# Patient Record
Sex: FEMALE | Race: AMERICAN INDIAN OR ALASKA NATIVE
[De-identification: names, ages, dates, MRNs, and addresses within clinical notes are randomized per-mention and may not be internally consistent; named-entity substitution may affect disease eponyms.]

---

## 2017-01-23 ENCOUNTER — HOSPITAL ENCOUNTER (OUTPATIENT)
Dept: HOSPITAL 75 - ER | Age: 67
Setting detail: OBSERVATION
LOS: 4 days | Discharge: HOME | End: 2017-01-27
Attending: FAMILY MEDICINE | Admitting: FAMILY MEDICINE
Payer: MEDICARE

## 2017-01-23 VITALS — DIASTOLIC BLOOD PRESSURE: 81 MMHG | SYSTOLIC BLOOD PRESSURE: 134 MMHG

## 2017-01-23 VITALS — BODY MASS INDEX: 35.51 KG/M2 | WEIGHT: 226.25 LBS | HEIGHT: 67 IN

## 2017-01-23 VITALS — SYSTOLIC BLOOD PRESSURE: 146 MMHG | DIASTOLIC BLOOD PRESSURE: 61 MMHG

## 2017-01-23 DIAGNOSIS — F41.9: ICD-10-CM

## 2017-01-23 DIAGNOSIS — F32.9: ICD-10-CM

## 2017-01-23 DIAGNOSIS — E11.622: ICD-10-CM

## 2017-01-23 DIAGNOSIS — I10: ICD-10-CM

## 2017-01-23 DIAGNOSIS — G62.9: ICD-10-CM

## 2017-01-23 DIAGNOSIS — B96.89: ICD-10-CM

## 2017-01-23 DIAGNOSIS — B95.2: ICD-10-CM

## 2017-01-23 DIAGNOSIS — I87.2: ICD-10-CM

## 2017-01-23 DIAGNOSIS — I50.9: ICD-10-CM

## 2017-01-23 DIAGNOSIS — L97.219: ICD-10-CM

## 2017-01-23 DIAGNOSIS — Z79.4: ICD-10-CM

## 2017-01-23 DIAGNOSIS — L03.115: Primary | ICD-10-CM

## 2017-01-23 LAB
ALBUMIN SERPL-MCNC: 4.2 G/DL (ref 3.2–4.5)
ALT SERPL-CCNC: 21 U/L (ref 0–55)
ANION GAP SERPL CALC-SCNC: 8 MMOL/L (ref 5–14)
AST SERPL-CCNC: 17 U/L (ref 5–34)
BASOPHILS # BLD AUTO: 0.1 10^3/UL (ref 0–0.1)
BASOPHILS NFR BLD AUTO: 1 % (ref 0–10)
BILIRUB SERPL-MCNC: 0.4 MG/DL (ref 0.1–1)
BILIRUB UR QL STRIP: NEGATIVE
BUN SERPL-MCNC: 9 MG/DL (ref 7–18)
BUN/CREAT SERPL: 11
CALCIUM SERPL-MCNC: 10.6 MG/DL (ref 8.5–10.1)
CHLORIDE SERPL-SCNC: 102 MMOL/L (ref 98–107)
CO2 SERPL-SCNC: 27 MMOL/L (ref 21–32)
CREAT SERPL-MCNC: 0.84 MG/DL (ref 0.6–1.3)
EOSINOPHIL # BLD AUTO: 0.4 10^3/UL (ref 0–0.3)
EOSINOPHIL NFR BLD AUTO: 5 % (ref 0–10)
ERYTHROCYTE [DISTWIDTH] IN BLOOD BY AUTOMATED COUNT: 14.9 % (ref 10–14.5)
GFR SERPLBLD BASED ON 1.73 SQ M-ARVRAT: > 60 ML/MIN
GLUCOSE SERPL-MCNC: 184 MG/DL (ref 70–105)
KETONES UR QL STRIP: NEGATIVE
LEUKOCYTE ESTERASE UR QL STRIP: (no result)
LYMPHOCYTES # BLD AUTO: 1.3 X 10^3 (ref 1–4)
LYMPHOCYTES NFR BLD AUTO: 14 % (ref 12–44)
MCH RBC QN AUTO: 26 PG (ref 25–34)
MCHC RBC AUTO-ENTMCNC: 32 G/DL (ref 32–36)
MCV RBC AUTO: 82 FL (ref 80–99)
MONOCYTES # BLD AUTO: 0.9 X 10^3 (ref 0–1)
MONOCYTES NFR BLD AUTO: 10 % (ref 0–12)
NEUTROPHILS # BLD AUTO: 6.2 X 10^3 (ref 1.8–7.8)
NEUTROPHILS NFR BLD AUTO: 70 % (ref 42–75)
NITRITE UR QL STRIP: NEGATIVE
PH UR STRIP: 8 [PH] (ref 5–9)
PLATELET # BLD: 272 10^3/UL (ref 130–400)
PMV BLD AUTO: 10.6 FL (ref 7.4–10.4)
POTASSIUM SERPL-SCNC: 4.1 MMOL/L (ref 3.6–5)
PROT SERPL-MCNC: 7.9 G/DL (ref 6.4–8.2)
PROT UR QL STRIP: (no result)
RBC # BLD AUTO: 4.59 10^6/UL (ref 4.35–5.85)
SODIUM SERPL-SCNC: 137 MMOL/L (ref 135–145)
SP GR UR STRIP: 1.01 (ref 1.02–1.02)
UROBILINOGEN UR-MCNC: NORMAL MG/DL
WBC # BLD AUTO: 8.9 10^3/UL (ref 4.3–11)
WBC #/AREA URNS HPF: (no result) /HPF

## 2017-01-23 PROCEDURE — 87088 URINE BACTERIA CULTURE: CPT

## 2017-01-23 PROCEDURE — 87186 SC STD MICRODIL/AGAR DIL: CPT

## 2017-01-23 PROCEDURE — 87077 CULTURE AEROBIC IDENTIFY: CPT

## 2017-01-23 PROCEDURE — 82962 GLUCOSE BLOOD TEST: CPT

## 2017-01-23 PROCEDURE — 80202 ASSAY OF VANCOMYCIN: CPT

## 2017-01-23 PROCEDURE — 87205 SMEAR GRAM STAIN: CPT

## 2017-01-23 PROCEDURE — 85027 COMPLETE CBC AUTOMATED: CPT

## 2017-01-23 PROCEDURE — 87070 CULTURE OTHR SPECIMN AEROBIC: CPT

## 2017-01-23 PROCEDURE — 81000 URINALYSIS NONAUTO W/SCOPE: CPT

## 2017-01-23 PROCEDURE — 83036 HEMOGLOBIN GLYCOSYLATED A1C: CPT

## 2017-01-23 PROCEDURE — 87040 BLOOD CULTURE FOR BACTERIA: CPT

## 2017-01-23 PROCEDURE — 80053 COMPREHEN METABOLIC PANEL: CPT

## 2017-01-23 PROCEDURE — 80048 BASIC METABOLIC PNL TOTAL CA: CPT

## 2017-01-23 PROCEDURE — 36415 COLL VENOUS BLD VENIPUNCTURE: CPT

## 2017-01-23 PROCEDURE — 74177 CT ABD & PELVIS W/CONTRAST: CPT

## 2017-01-23 PROCEDURE — 83605 ASSAY OF LACTIC ACID: CPT

## 2017-01-23 PROCEDURE — 85025 COMPLETE CBC W/AUTO DIFF WBC: CPT

## 2017-01-23 PROCEDURE — 96365 THER/PROPH/DIAG IV INF INIT: CPT

## 2017-01-23 RX ADMIN — Medication SCH ML: at 20:43

## 2017-01-23 RX ADMIN — INSULIN DETEMIR SCH UNIT: 100 INJECTION, SOLUTION SUBCUTANEOUS at 21:52

## 2017-01-23 RX ADMIN — ROPINIROLE SCH MG: 1 TABLET ORAL at 21:53

## 2017-01-23 RX ADMIN — ALPRAZOLAM SCH MG: 0.25 TABLET ORAL at 21:52

## 2017-01-23 RX ADMIN — INSULIN HUMAN SCH UNIT: 100 INJECTION, SOLUTION PARENTERAL at 20:45

## 2017-01-23 NOTE — ED INTEGUMENTARY GENERAL
General


Chief Complaint:  Lower Extremity


Stated Complaint:  BILATERAL LEG SWELLING, DIABETES


Source:  patient


Exam Limitations:  no limitations





History of Present Illness


Time seen by provider:  15:25


Initial Comments


To ER with bilateral lower actually redness and swelling for the past few days.

  She's had chronic swelling to the left leg after having a hip replacement 

about a year and a half ago.  The swelling and redness to the right leg is more 

pronounced over the past few days than usual.  There is now an open sore to 

this area as well.  No fevers or chills.  She is diabetic.


Severity:  moderate


Possible Cause:  no cause identified


Associated Symptoms:   denies symptoms





Allergies and Home Medications


Allergies


Coded Allergies:  


     No Known Drug Allergies (Unverified , 3/11/14)





Home Medications


Aspirin 81 Mg Tablet.dr 243 MG PO DAILY (Reported) 


   TAKES 3 (81 MG) TABLETS  


Calcium Carbonate 200 Mg Tab.chew 400 MG PO AS NEEDED PRN PRN INDIGESTION (

Reported) 


Diclofenac Sod 100 Gm Gel TP QID PRN PRN BACK PAIN (Reported) 


Duloxetine HCl 60 Mg Capsule.dr 60 MG PO DAILY (Reported) 


Furosemide 40 Mg Tablet 40 MG PO DAILY (Reported) 


Hydrocodone/Acetaminophen 1 Each Tablet 1 TAB PO Q4H PRN PRN PAIN (Reported) 


Insulin Aspart 300 Units/3 Ml Solution SQ UD (Reported) 


   PER SLIDING SCALE 


Insulin Glargine,Hum.rec.anlog 100 Unit/1 Ml Insuln.pen 20 UNITS SQ DAILY (

Reported) 


   HOLD IF BLOOD SUGAR IS 95 OR BELOW 


Loperamide HCl 2 Mg Capsule 2 MG PO QID PRN PRN DIARRHEA (Reported) 


Magnesium Hydroxide 400 Mg/5 Ml Oral.susp 30 ML PO DAILY PRN PRN CONSTIPATION (

Reported) 


Metoprolol Succinate 50 Mg Tab.er.24h 25 MG PO DAILY (Reported) 


Multivits W-Ca,Fe,Other Min 1 Each Tablet 1 TAB PO DAILY (Reported) 


Polyethylene Glycol 3350 255 Gm Powder 17 GM PO HS PRN PRN CONSTIPATION (

Reported) 


Pregabalin 150 Mg Capsule 150 MG PO TID (Reported) 


Ropinirole HCl 2 Mg Tablet 2 MG PO BID (Reported) 


Sulfamethoxazole/Trimethoprim 1 Each Tablet #14 1 EACH PO BID 


   Prescribed by: ROSELINE SCHAEFER on 2/9/16 1230


Tramadol HCl 50 Mg Tablet 50 MG PO Q6H PRN PRN PAIN (Reported) 


   TAKES FOR MILD PAIN 


Whey Protein Isolate 1 Each Powd.pack 1 PACKET PO TID (Reported) 





Constitutional:   see HPI


EENTM:   see HPI


Respiratory:   no symptoms reported


Cardiovascular:   no symptoms reported


Genitourinary:   no symptoms reported


Musculoskeletal:   no symptoms reported


Skin:   see HPI


Psychiatric/Neurological:   No Symptoms Reported


Endocrine:   No Symptoms Reported


Hematologic/Lymphatic:   No Symptoms Reported





Past Medical-Social-Family Hx


Patient Social History


Former Smoker/When Quit:  Feb 5, 1992


Recent Foreign Travel:  No


Contact w/Someone Who Travel:  No





Immunizations Up To Date


Tetanus Booster (TDap):  Less than 5yrs


PED Vaccines UTD:  Yes


Date of Pneumonia Vaccine:  Sep 12, 2012


Date of Influenza Vaccine:  Sep 18, 2015





Seasonal Allergies


Seasonal Allergies:  No





Surgeries


HX Surgeries:  Yes (HEMORRHOIDECTOMY, L HIP replacement, reduction left hip 

dislocation x2)


Surgeries:  Gallbladder, Orthopedic





Respiratory


Hx Respiratory Disorders:  No





Cardiovascular


Hx Cardiac Disorders:  Yes (CHF)


Cardiac Disorders:  Hypertension





Neurological


Hx Neurological Disorders:  Yes (RLS)





Reproductive System


Hx Reproductive Disorders:  No


Sexually Transmitted Disease:  No


HIV/AIDS:  No





Genitourinary


Hx Genitourinary Disorders:  No





Gastrointestinal


Hx Gastrointestinal Disorders:  Yes


Gastrointestinal Disorders:  Chronic Constipation, Hemorrhoids





Musculoskeletal


Musculoskeletal Disorders:  Arthritis, Back Injury, Chronic Back Pain





Endocrine


Hx Endocrine Disorders:  Yes (Lantus/ Novolog)


Endocrine Disorders:  Diabetes, Insulin dep





HEENT


HX ENT Disorders:  Yes (report the start of cataracts, migrains)


HEENT Disorders:  Cataract


Loss of Vision:  Bilateral


Hearing Impairment:  Denies





Cancer


Hx Cancer:  No





Psychosocial


Hx Psychiatric Problems:  Yes


Behavioral Health Disorders:  Anxiety, Depression





Integumentary


HX Skin/Integumentary Disorder:  No





Blood Transfusions


Hx Blood Disorders:  No


Adverse Reaction to a Blood Tr:  No





Family Medical History


Significant Family History:  No Pertinent Family Hx


Family Medial History:  


Congestive heart failure


  03 FATHER


Prostate cancer


  03 FATHER





No Family History of:


  Abdominal aortic aneurysm


  Alexander's disease


  Alcoholism


  Aphasia


  Cancer


  Cancer of colon


  Cataract


  Congenital heart disease


  Cystic fibrosis


  Dementia


  Dysphagia


  Family history: Allergy


  Family history: Alzheimer's disease


  Family history: Arthritis


  Family history: Asthma


  Family history: Breast disease


  Family history: Cardiovascular disease


  Family history: Coronary thrombosis


  Family history: Diabetes mellitus


  Family history: Gastrointestinal disease


  Family history: Glaucoma


  Family history: Hypertension


  Family history: Osteoporosis


  Family history: Thyroid disorder


  Headache


  Hearing loss


  Heart disease


  Hereditary disease


  History of - anemia


  History of - disorder


  History of - respiratory disease


  History of drug abuse


  Human immunodeficiency virus (HIV) seropositivity


  Hypercholesterolemia


  Infertile


  Kidney disease


  Malignant neoplasm of lung


  Myocardial infarction


  Parkinson's disease


  Psychotic disorder


  Seizure disorder


  Stroke


  Tuberculosis


  Visual impairment





Physical Exam


Vital Signs





 Vital Sign - Last 12Hours








 1/23/17





 15:35


 


Temp 97.2


 


Pulse 85


 


Resp 20


 


B/P 154/77


 


Pulse Ox 96





Capillary Refill :


General Appearance:   WD/WN no apparent distress


HEENT:   PERRL/EOMI normal ENT inspection


Neck:   non-tender full range of motion


Respiratory:   no respiratory distress no accessory muscle use


Neurologic/Psychiatric:   alert normal mood/affect oriented x 3


Skin:   normal color warm/dry


Skin Problem Character:  erythema


Comments


Erythema to bilateral lower extremities which is likely chronic venous stasis 

dermatitis.  The left leg is swollen no more so than usual but greater than the 

right leg.  We will ultrasound the left leg.  There is an open area to the 

medial aspect of the right lower leg with some drainage.





Progress/Results/Core Measures


Results/Orders


Lab Results





 Laboratory Tests








Test


  1/23/17


15:24 Range/Units


 


 


Alanine Aminotransferase


(ALT/SGPT) 21 


  0-55  U/L


 


 


Albumin 4.2  3.2-4.5  G/DL


 


Alkaline Phosphatase 108    U/L


 


Anion Gap 8  5-14  MMOL/L


 


Aspartate Amino Transf


(AST/SGOT) 17 


  5-34  U/L


 


 


BUN/Creatinine Ratio 11   


 


Basophils # (Auto)


  0.1 


  0.0-0.1


10^3/uL


 


Basophils (%) (Auto) 1  0-10  %


 


Blood Urea Nitrogen 9  7-18  MG/DL


 


Calcium Level 10.6 H 8.5-10.1  MG/DL


 


Carbon Dioxide Level 27  21-32  MMOL/L


 


Chloride Level 102    MMOL/L


 


Creatinine


  0.84 


  0.60-1.30


MG/DL


 


Eosinophils # (Auto)


  0.4 H


  0.0-0.3


10^3/uL


 


Eosinophils (%) (Auto) 5  0-10  %


 


Estimat Glomerular Filtration


Rate > 60 


   


 


 


Glucose Level 184 H   MG/DL


 


Hematocrit 38  35-52  %


 


Hemoglobin 12.1  11.5-16.0  G/DL


 


Lactic Acid Level 1.0  0.5-2.0  MMOL/L


 


Lymphocytes # (Auto) 1.3  1.0-4.0  X 10^3


 


Lymphocytes (%) (Auto) 14  12-44  %


 


Mean Corpuscular Hemoglobin 26  25-34  PG


 


Mean Corpuscular Hemoglobin


Concent 32 


  32-36  G/DL


 


 


Mean Corpuscular Volume 82  80-99  FL


 


Mean Platelet Volume 10.6 H 7.4-10.4  FL


 


Monocytes # (Auto) 0.9  0.0-1.0  X 10^3


 


Monocytes (%) (Auto) 10  0-12  %


 


Neutrophils # (Auto) 6.2  1.8-7.8  X 10^3


 


Neutrophils (%) (Auto) 70  42-75  %


 


Platelet Count


  272 


  130-400


10^3/uL


 


Potassium Level 4.1  3.6-5.0  MMOL/L


 


Red Blood Count


  4.59 


  4.35-5.85


10^6/uL


 


Red Cell Distribution Width 14.9 H 10.0-14.5  %


 


Sodium Level 137  135-145  MMOL/L


 


Total Bilirubin 0.4  0.1-1.0  MG/DL


 


Total Protein 7.9  6.4-8.2  G/DL


 


White Blood Count


  8.9 


  4.3-11.0


10^3/uL








My Orders





 Orders-CHARMAINE MCKENZIE


Cbc With Automated Diff (1/23/17 15:16)


Comprehensive Metabolic Panel (1/23/17 15:16)


Blood Culture (1/23/17 15:16)


Lactic Acid Analyzer (1/23/17 15:16)


Saline Lock/Iv-Start (1/23/17 15:16)


Wound Culture (1/23/17 15:16)


Us Venous Lower Ext Lt (1/23/17 15:31)


Vancomycin Injection (Vancomycin Injecti (1/23/17 16:15)





Vital Signs/I&O





 Vital Sign - Last 12Hours








 1/23/17





 15:35


 


Temp 97.2


 


Pulse 85


 


Resp 20


 


B/P 154/77


 


Pulse Ox 96











Departure


Communication


Time/Spoke to Admitting Phy:  16:09


Communication


Discussed with Dr. Dr. Dacosta.  We'll admit patient for IV antibiotics.  

Consult Dr. Mcnally.





Impression


Impression:  


 Primary Impression:  


 Cellulitis of right leg


Disposition:  09 ADMITTED AS INPATIENT


Condition:  Stable


Decision to Admit Reason:  Admit from ER (General)


Decision to Admit/Date:  Jan 23, 2017


Time/Decision to Admit Time:  16:09





Departure-Patient Inst.


Referrals:  


LELAND DACOSTA DO (PCP/Family)


Primary Care Physician








CHARMAINE MCKENZIE Jan 23, 2017 15:28


Primary Care Physician








CHARMAINE MCKENZIE Jan 23, 2017 15:28

## 2017-01-23 NOTE — DIAGNOSTIC IMAGING REPORT
PROCEDURE: US left lower extremity venous.



TECHNIQUE: Multiple real-time grayscale images were obtained

over the left lower extremity in various projections. Additional

duplex Doppler and color Doppler images were also obtained.



INDICATION: Left leg redness and swelling.



FINDINGS: Left extremity shows normal color flow Doppler imaging

of the venous system from the external iliac vein to the ankle.

Calf compression shows normal augmentation of flow at the

popliteal level. No evidence of popliteal cyst.



IMPRESSION: Normal left lower extremity color duplex venous

ultrasound.



Dictated by:



Dictated on workstation # [id number]

## 2017-01-24 VITALS — DIASTOLIC BLOOD PRESSURE: 60 MMHG | SYSTOLIC BLOOD PRESSURE: 126 MMHG

## 2017-01-24 VITALS — SYSTOLIC BLOOD PRESSURE: 139 MMHG | DIASTOLIC BLOOD PRESSURE: 63 MMHG

## 2017-01-24 VITALS — DIASTOLIC BLOOD PRESSURE: 63 MMHG | SYSTOLIC BLOOD PRESSURE: 135 MMHG

## 2017-01-24 VITALS — SYSTOLIC BLOOD PRESSURE: 132 MMHG | DIASTOLIC BLOOD PRESSURE: 71 MMHG

## 2017-01-24 VITALS — SYSTOLIC BLOOD PRESSURE: 136 MMHG | DIASTOLIC BLOOD PRESSURE: 63 MMHG

## 2017-01-24 VITALS — DIASTOLIC BLOOD PRESSURE: 67 MMHG | SYSTOLIC BLOOD PRESSURE: 121 MMHG

## 2017-01-24 LAB
ALBUMIN SERPL-MCNC: 3.4 G/DL (ref 3.2–4.5)
ALT SERPL-CCNC: 16 U/L (ref 0–55)
ANION GAP SERPL CALC-SCNC: 9 MMOL/L (ref 5–14)
AST SERPL-CCNC: 15 U/L (ref 5–34)
BILIRUB SERPL-MCNC: 0.3 MG/DL (ref 0.1–1)
BUN SERPL-MCNC: 9 MG/DL (ref 7–18)
BUN/CREAT SERPL: 12
CALCIUM SERPL-MCNC: 9.8 MG/DL (ref 8.5–10.1)
CHLORIDE SERPL-SCNC: 107 MMOL/L (ref 98–107)
CO2 SERPL-SCNC: 25 MMOL/L (ref 21–32)
CREAT SERPL-MCNC: 0.74 MG/DL (ref 0.6–1.3)
ERYTHROCYTE [DISTWIDTH] IN BLOOD BY AUTOMATED COUNT: 14.9 % (ref 10–14.5)
GFR SERPLBLD BASED ON 1.73 SQ M-ARVRAT: > 60 ML/MIN
GLUCOSE SERPL-MCNC: 102 MG/DL (ref 70–105)
MCH RBC QN AUTO: 26 PG (ref 25–34)
MCHC RBC AUTO-ENTMCNC: 32 G/DL (ref 32–36)
MCV RBC AUTO: 83 FL (ref 80–99)
PLATELET # BLD: 257 10^3/UL (ref 130–400)
PMV BLD AUTO: 10.7 FL (ref 7.4–10.4)
POTASSIUM SERPL-SCNC: 4 MMOL/L (ref 3.6–5)
PROT SERPL-MCNC: 6.4 G/DL (ref 6.4–8.2)
RBC # BLD AUTO: 4.12 10^6/UL (ref 4.35–5.85)
SODIUM SERPL-SCNC: 141 MMOL/L (ref 135–145)
WBC # BLD AUTO: 8.3 10^3/UL (ref 4.3–11)

## 2017-01-24 RX ADMIN — Medication SCH ML: at 14:53

## 2017-01-24 RX ADMIN — ENOXAPARIN SODIUM SCH MG: 100 INJECTION SUBCUTANEOUS at 08:29

## 2017-01-24 RX ADMIN — INSULIN HUMAN SCH UNIT: 100 INJECTION, SOLUTION PARENTERAL at 10:20

## 2017-01-24 RX ADMIN — ALPRAZOLAM SCH MG: 0.25 TABLET ORAL at 08:28

## 2017-01-24 RX ADMIN — INSULIN HUMAN SCH UNIT: 100 INJECTION, SOLUTION PARENTERAL at 16:00

## 2017-01-24 RX ADMIN — Medication SCH ML: at 05:36

## 2017-01-24 RX ADMIN — MICONAZOLE NITRATE SCH APPLIC: 20 POWDER TOPICAL at 22:19

## 2017-01-24 RX ADMIN — ROPINIROLE SCH MG: 1 TABLET ORAL at 22:18

## 2017-01-24 RX ADMIN — PREGABALIN SCH MG: 75 CAPSULE ORAL at 10:11

## 2017-01-24 RX ADMIN — VANCOMYCIN HYDROCHLORIDE SCH MLS/HR: 1 INJECTION, POWDER, LYOPHILIZED, FOR SOLUTION INTRAVENOUS at 19:49

## 2017-01-24 RX ADMIN — INSULIN HUMAN SCH UNIT: 100 INJECTION, SOLUTION PARENTERAL at 05:47

## 2017-01-24 RX ADMIN — FUROSEMIDE SCH MG: 40 TABLET ORAL at 08:28

## 2017-01-24 RX ADMIN — PREGABALIN SCH MG: 75 CAPSULE ORAL at 22:19

## 2017-01-24 RX ADMIN — VANCOMYCIN HYDROCHLORIDE SCH MLS/HR: 1 INJECTION, POWDER, LYOPHILIZED, FOR SOLUTION INTRAVENOUS at 06:35

## 2017-01-24 RX ADMIN — PREGABALIN SCH MG: 75 CAPSULE ORAL at 14:53

## 2017-01-24 RX ADMIN — INSULIN DETEMIR SCH UNIT: 100 INJECTION, SOLUTION SUBCUTANEOUS at 08:29

## 2017-01-24 RX ADMIN — PANTOPRAZOLE SODIUM SCH MG: 40 TABLET, DELAYED RELEASE ORAL at 06:35

## 2017-01-24 RX ADMIN — ROPINIROLE SCH MG: 1 TABLET ORAL at 08:28

## 2017-01-24 RX ADMIN — DULOXETINE HYDROCHLORIDE SCH MG: 30 CAPSULE, DELAYED RELEASE ORAL at 08:28

## 2017-01-24 RX ADMIN — INSULIN DETEMIR SCH UNIT: 100 INJECTION, SOLUTION SUBCUTANEOUS at 22:18

## 2017-01-24 RX ADMIN — Medication SCH ML: at 22:19

## 2017-01-24 RX ADMIN — ALPRAZOLAM SCH MG: 0.25 TABLET ORAL at 22:18

## 2017-01-24 RX ADMIN — INSULIN HUMAN SCH UNIT: 100 INJECTION, SOLUTION PARENTERAL at 22:17

## 2017-01-24 RX ADMIN — ROSUVASTATIN CALCIUM SCH MG: 5 TABLET, FILM COATED ORAL at 22:18

## 2017-01-24 RX ADMIN — MICONAZOLE NITRATE SCH APPLIC: 20 POWDER TOPICAL at 10:10

## 2017-01-24 NOTE — HISTORY & PHYSICIAL
History of Present Illness


History of Present Illness


Reason for visit/HPI


patient came to the emergency room due to redness of both legs the right one is 

worse.


Patient has cellulitis of legs.


Patient admitted for IV antibiotics and wound care


Date of Admission


2017 at 16:06


I consulted on this patient on


17


 08:08


Attending Physician


Edvin Dacosta DO


Admitting Physician


Edvin Dacosta DO


Consult








Allergies and Home Medications


Allergies


Coded Allergies:  


     No Known Drug Allergies (Unverified , 3/11/14)





Home Medications


Alprazolam 0.25 Mg Tablet 0.25 MG PO BID (Reported) 


Calcium Carbonate 200 Mg Tab.chew 400 MG PO AS NEEDED PRN PRN INDIGESTION (

Reported) 


Duloxetine HCl 60 Mg Capsule.dr 30 MG PO DAILY (Reported) 


Esomeprazole Magnesium 40 Mg Cap 40 MG PO DAILY (Reported) 


Furosemide 40 Mg Tablet 40 MG PO DAILY (Reported) 


Insulin Aspart 300 Units/3 Ml Solution SQ UD (Reported) 


   PER SLIDING SCALE 


Insulin Glargine,Hum.rec.anlog 100 Unit/1 Ml Insuln.pen 40 UNITS SQ BID (

Reported) 


   HOLD IF BLOOD SUGAR IS 95 OR BELOW 


Multivits W-Ca,Fe,Other Min 1 Each Tablet 1 TAB PO DAILY (Reported) 


Pregabalin 150 Mg Capsule 150 MG PO TID (Reported) 


Ropinirole HCl 2 Mg Tablet 2 MG PO BID (Reported) 


Rosuvastatin Calcium 10 Mg Tablet 10 MG PO DAILY (Reported) 


Tramadol HCl 50 Mg Tablet 50 MG PO Q8H (Reported) 


   TAKES FOR MILD PAIN 





Past Medical-Social-Family Hx


Patient Social History


Marrital Status:  


Employed/Student:  unemployed


Alcohol Use:  Denies Use


Recreational Drug Use:  No


Smoking Status:  Never a Smoker


Former smoker/When Quit:  1992


Physical Abuse Screen:  No


Sexual Abuse:  No


Recent Foreign Travel:  No


Contact w/other who traveled:  No


Recent Hopitalizations:  No


Recent Infectious Disease Expo:  No





Immunizations Up To Date


Tetanus Booster (TDap):  Less than 5yrs


Date of Pneumonia Vaccine:  Sep 12, 2012


Date of Influenza Vaccine:  Sep 18, 2016





Seasonal Allergies


Seasonal Allergies:  No





Surgeries


HX Surgeries:  Yes (HEMORRHOIDECTOMY, L HIP replacement, reduction left hip 

dislocation x2)


Surgeries:  Gallbladder, Orthopedic





Respiratory


Hx Respiratory Disorders:  No





Cardiovascular


Hx Cardiovascular Disorders:  Yes (CHF)


Cardiac Disorders:  Hypertension





Neurological


Hx Neurological Disorders:  Yes (RLS)


Neurological Disorders:  Neuropathy





Reproductive System


Pregnant:  No


Hx Reproductive Disorders:  No


Sexually Transmitted Disease:  No


HIV/AIDS:  No





Genitourinary


Hx Genitourinary Disorders:  No





Gastrointestinal


Hx Gastrointestinal Disorders:  Yes


Gastrointestinal Disorders:  Chronic Constipation, Hemorrhoids





Musculoskeletal


Musculoskeletal Disorders:  Arthritis, Back Injury, Chronic Back Pain





Endocrine


Hx Endocrine Disorders:  Yes (Lantus/ Novolog)


Endocrine Disorders:  Diabetes, Insulin dep





HEENT


HX ENT Disorders:  Yes (report the start of cataracts, migrains)


HEENT Disorders:  Cataract


Loss of Vision:  Bilateral


Hearing Impairment:  Denies





Cancer


Hx Cancer:  No





Psychosocial


Hx Psychiatric Problems:  Yes


Behavioral Health Disorders:  Anxiety, Depression





Integumentary


HX Skin/Integumentary Disorder:  Yes (SEEING WOUND CARE FOR WOUND ON FEET)





Blood Transfusions


Hx Blood Disorders:  No


Adverse Reaction to a Blood Tr:  No





Family Medical History


Significant Family History:  No Pertinent Family Hx


Family Hx:  


Congestive heart failure


  03 FATHER


Prostate cancer


  03 FATHER





No Family History of:


  Abdominal aortic aneurysm


  Alexander's disease


  Alcoholism


  Aphasia


  Cancer


  Cancer of colon


  Cataract


  Congenital heart disease


  Cystic fibrosis


  Dementia


  Dysphagia


  Family history: Allergy


  Family history: Alzheimer's disease


  Family history: Arthritis


  Family history: Asthma


  Family history: Breast disease


  Family history: Cardiovascular disease


  Family history: Coronary thrombosis


  Family history: Diabetes mellitus


  Family history: Gastrointestinal disease


  Family history: Glaucoma


  Family history: Hypertension


  Family history: Osteoporosis


  Family history: Thyroid disorder


  Headache


  Hearing loss


  Heart disease


  Hereditary disease


  History of - anemia


  History of - disorder


  History of - respiratory disease


  History of drug abuse


  Human immunodeficiency virus (HIV) seropositivity


  Hypercholesterolemia


  Infertile


  Kidney disease


  Malignant neoplasm of lung


  Myocardial infarction


  Parkinson's disease


  Psychotic disorder


  Seizure disorder


  Stroke


  Tuberculosis


  Visual impairment





Constitutional:   weakness


EENTM:   no symptoms reported


Respiratory:   no symptoms reported


Cardiovascular:   no symptoms reported


Gastrointestinal:   no symptoms reported


Genitourinary:   no symptoms reported





Physical Exam


Vital Signs





 Vital Sign - Last 12Hours








 17





 15:35 17:45


 


Temp 97.2 


 


Pulse 85 


 


Resp 20 


 


B/P 154/77 


 


Pulse Ox 96 


 


O2 Delivery  Room Air





Capillary Refill : Less Than 3 SecondsLess Than 3 Seconds


General Appearance:   No Apparent Distress WD/WN


Eyes:  Bilateral Eye Normal Inspection


HEENT:   Normal ENT Inspection


Neck:   Full Range of Motion Normal Inspection


Respiratory:   Chest Non Tender Lungs Clear Normal Breath Sounds No Accessory 

Muscle Use No Respiratory Distress


Cardiovascular:   Regular Rate, Rhythm No Murmur


Gastrointestinal:   Non Tender Soft





Assessment/Plan


Assessment and Plan


cellulitis of legs worse on right.


Diabetes





Clinical Quality Measures


DVT/VTE Risk/Contraindication:


Risk Factor Score Per Nursin


RFS Level Per Nursing on Admit:  4+=Very High








EDVIN DACOSTA DO 2017 08:11

## 2017-01-24 NOTE — WOUND CARE PROGRESS NOTE
Subjective


Subjective


Subjective/Events-last exam


67 year old female with worsening cellulitis of bilateral lower extremities.  

She has been admitted overnight, with IV antibiotics and elevation.  Her leg 

has improved.  She has an ulcer with crusting on the medial right calf, which 

appears to be adjacent to the area of cellulitis on that side.  





PMH: CHF, HTN, DM, Neuropathy, Anxiety, Depression.





FH:  CHF, cancer.





SH:  resides assisted living.





Review of Systems


General:  No Chills,  Fatigue


HEENT:   Head Aches


Pulmonary:  No Dyspnea


Cardiovascular:  No: Chest Pain


Musculoskeletal:  : leg pain





Objective


Exam


Last Set of Vital Signs





Vital Signs








  Date Time  Temp Pulse Resp B/P Pulse Ox O2 Delivery O2 Flow Rate FiO2


 


1/24/17 15:55 97.5 75 22 136/63 100 Room Air  





Capillary Refill : Less Than 3 SecondsLess Than 3 Seconds


I&O


Bad tableGeneral:  Alert, No Acute Distress


Lungs:  Clear to Auscultation, Normal Air Movement


Heart:  Regular Rate, No Murmurs


Abdomen:  Normal Bowel Sounds, Soft


Skin:  Other (R medial calf  --  3.7 x 2.0 x 0.1 cm; base dessicated, no 

drainage.)





Results


Lab


 Laboratory Tests


1/23/17 20:44: Glucometer 150H


1/23/17 21:28: Glucometer 182H


1/24/17 05:20: 


Alanine Aminotransferase (ALT/SGPT) 16, Albumin 3.4, Alkaline Phosphatase 85, 

Anion Gap 9, Aspartate Amino Transf (AST/SGOT) 15, BUN/Creatinine Ratio 12, 

Blood Urea Nitrogen 9, Calcium Level 9.8, Carbon Dioxide Level 25, Chloride 

Level 107, Creatinine 0.74, Estimat Glomerular Filtration Rate > 60, Glucose 

Level 102, Hematocrit 34L, Hemoglobin 10.8L, Hemoglobin A1c 7.9H, Mean 

Corpuscular Hemoglobin 26, Mean Corpuscular Hemoglobin Concent 32, Mean 

Corpuscular Volume 83, Mean Platelet Volume 10.7H, Platelet Count 257, 

Potassium Level 4.0, Red Blood Count 4.12L, Red Cell Distribution Width 14.9H, 

Sodium Level 141, Total Bilirubin 0.3, Total Protein 6.4, White Blood Count 8.3


1/24/17 05:45: Glucometer 105


1/24/17 10:16: Glucometer 71


1/24/17 16:00: Glucometer 131H


1/24/17 18:36: Vancomycin Level Trough 14.7





 Microbiology


1/23/17 Blood Culture - Preliminary, Resulted


          No growth


1/23/17 Urine Culture - Preliminary, Resulted


          


1/23/17 Gram Stain - Final, Resulted


          


1/23/17 Wound Culture - Preliminary, Resulted


          Enterococcus Faecalis


          Bacillus Species





Assessment/Plan


1. Cellulitis, BLE.





2. Venous insufficiency.





3.  Diabetes mellitus.





Plan:  Elevation, leave wound open to air, arterial evaluation.








ROSHNI MOREIRA MD Jan 24, 2017 19:46

## 2017-01-25 VITALS — DIASTOLIC BLOOD PRESSURE: 77 MMHG | SYSTOLIC BLOOD PRESSURE: 113 MMHG

## 2017-01-25 VITALS — DIASTOLIC BLOOD PRESSURE: 66 MMHG | SYSTOLIC BLOOD PRESSURE: 128 MMHG

## 2017-01-25 VITALS — SYSTOLIC BLOOD PRESSURE: 143 MMHG | DIASTOLIC BLOOD PRESSURE: 80 MMHG

## 2017-01-25 LAB
ALBUMIN SERPL-MCNC: 3.4 G/DL (ref 3.2–4.5)
ALT SERPL-CCNC: 17 U/L (ref 0–55)
ANION GAP SERPL CALC-SCNC: 10 MMOL/L (ref 5–14)
AST SERPL-CCNC: 14 U/L (ref 5–34)
BASOPHILS # BLD AUTO: 0.1 10^3/UL (ref 0–0.1)
BASOPHILS NFR BLD AUTO: 2 % (ref 0–10)
BILIRUB SERPL-MCNC: 0.2 MG/DL (ref 0.1–1)
BUN SERPL-MCNC: 20 MG/DL (ref 7–18)
BUN/CREAT SERPL: 25
CALCIUM SERPL-MCNC: 9.3 MG/DL (ref 8.5–10.1)
CHLORIDE SERPL-SCNC: 105 MMOL/L (ref 98–107)
CO2 SERPL-SCNC: 24 MMOL/L (ref 21–32)
CREAT SERPL-MCNC: 0.81 MG/DL (ref 0.6–1.3)
EOSINOPHIL # BLD AUTO: 0.5 10^3/UL (ref 0–0.3)
EOSINOPHIL NFR BLD AUTO: 8 % (ref 0–10)
ERYTHROCYTE [DISTWIDTH] IN BLOOD BY AUTOMATED COUNT: 14.8 % (ref 10–14.5)
GFR SERPLBLD BASED ON 1.73 SQ M-ARVRAT: > 60 ML/MIN
GLUCOSE SERPL-MCNC: 102 MG/DL (ref 70–105)
LYMPHOCYTES # BLD AUTO: 1.3 X 10^3 (ref 1–4)
LYMPHOCYTES NFR BLD AUTO: 18 % (ref 12–44)
MCH RBC QN AUTO: 27 PG (ref 25–34)
MCHC RBC AUTO-ENTMCNC: 32 G/DL (ref 32–36)
MCV RBC AUTO: 83 FL (ref 80–99)
MONOCYTES # BLD AUTO: 0.8 X 10^3 (ref 0–1)
MONOCYTES NFR BLD AUTO: 11 % (ref 0–12)
NEUTROPHILS # BLD AUTO: 4.4 X 10^3 (ref 1.8–7.8)
NEUTROPHILS NFR BLD AUTO: 61 % (ref 42–75)
PLATELET # BLD: 254 10^3/UL (ref 130–400)
PMV BLD AUTO: 10 FL (ref 7.4–10.4)
POTASSIUM SERPL-SCNC: 4 MMOL/L (ref 3.6–5)
PROT SERPL-MCNC: 6.3 G/DL (ref 6.4–8.2)
RBC # BLD AUTO: 4.23 10^6/UL (ref 4.35–5.85)
SODIUM SERPL-SCNC: 139 MMOL/L (ref 135–145)
WBC # BLD AUTO: 7.1 10^3/UL (ref 4.3–11)

## 2017-01-25 RX ADMIN — VANCOMYCIN HYDROCHLORIDE SCH MLS/HR: 1 INJECTION, POWDER, LYOPHILIZED, FOR SOLUTION INTRAVENOUS at 05:45

## 2017-01-25 RX ADMIN — Medication SCH ML: at 12:06

## 2017-01-25 RX ADMIN — INSULIN HUMAN SCH UNIT: 100 INJECTION, SOLUTION PARENTERAL at 11:00

## 2017-01-25 RX ADMIN — DOCUSATE SODIUM SCH MG: 100 CAPSULE ORAL at 21:20

## 2017-01-25 RX ADMIN — PREGABALIN SCH MG: 75 CAPSULE ORAL at 21:20

## 2017-01-25 RX ADMIN — INSULIN HUMAN SCH UNIT: 100 INJECTION, SOLUTION PARENTERAL at 05:46

## 2017-01-25 RX ADMIN — PANTOPRAZOLE SODIUM SCH MG: 40 TABLET, DELAYED RELEASE ORAL at 05:44

## 2017-01-25 RX ADMIN — MICONAZOLE NITRATE SCH APPLIC: 20 POWDER TOPICAL at 21:22

## 2017-01-25 RX ADMIN — INSULIN HUMAN SCH UNIT: 100 INJECTION, SOLUTION PARENTERAL at 21:21

## 2017-01-25 RX ADMIN — DULOXETINE HYDROCHLORIDE SCH MG: 30 CAPSULE, DELAYED RELEASE ORAL at 08:03

## 2017-01-25 RX ADMIN — ROPINIROLE SCH MG: 1 TABLET ORAL at 08:03

## 2017-01-25 RX ADMIN — PREGABALIN SCH MG: 75 CAPSULE ORAL at 08:04

## 2017-01-25 RX ADMIN — FUROSEMIDE SCH MG: 40 TABLET ORAL at 08:03

## 2017-01-25 RX ADMIN — INSULIN HUMAN SCH UNIT: 100 INJECTION, SOLUTION PARENTERAL at 16:00

## 2017-01-25 RX ADMIN — ENOXAPARIN SODIUM SCH MG: 100 INJECTION SUBCUTANEOUS at 08:03

## 2017-01-25 RX ADMIN — PREGABALIN SCH MG: 75 CAPSULE ORAL at 12:06

## 2017-01-25 RX ADMIN — ALPRAZOLAM SCH MG: 0.25 TABLET ORAL at 08:03

## 2017-01-25 RX ADMIN — MICONAZOLE NITRATE SCH APPLIC: 20 POWDER TOPICAL at 08:04

## 2017-01-25 RX ADMIN — INSULIN DETEMIR SCH UNIT: 100 INJECTION, SOLUTION SUBCUTANEOUS at 21:21

## 2017-01-25 RX ADMIN — ROPINIROLE SCH MG: 1 TABLET ORAL at 21:20

## 2017-01-25 RX ADMIN — Medication SCH ML: at 21:21

## 2017-01-25 RX ADMIN — MAGNESIUM HYDROXIDE PRN ML: 400 SUSPENSION ORAL at 16:26

## 2017-01-25 RX ADMIN — Medication SCH ML: at 05:45

## 2017-01-25 RX ADMIN — VANCOMYCIN HYDROCHLORIDE SCH MLS/HR: 1 INJECTION, POWDER, LYOPHILIZED, FOR SOLUTION INTRAVENOUS at 20:03

## 2017-01-25 RX ADMIN — ALPRAZOLAM SCH MG: 0.25 TABLET ORAL at 21:20

## 2017-01-25 RX ADMIN — INSULIN DETEMIR SCH UNIT: 100 INJECTION, SOLUTION SUBCUTANEOUS at 08:04

## 2017-01-25 RX ADMIN — ROSUVASTATIN CALCIUM SCH MG: 5 TABLET, FILM COATED ORAL at 21:20

## 2017-01-25 NOTE — DIAGNOSTIC IMAGING REPORT
PROCEDURE:

CT abdomen and pelvis with contrast.



TECHNIQUE:

Multiple contiguous axial images were obtained through the

abdomen and pelvis after administration of intravenous contrast.





INDICATION:

Right lower quadrant pain.



FINDINGS:

In the interval since the previous CT abdomen/pelvis exam of

03/10/2014, bilateral total hip prostheses have been inserted.

The prostheses seem to be in good position but the artifact

related to the prostheses does limit the evaluation of the

pelvic contents. There is no pelvic mass or free fluid

collection noted. The uterus and urinary bladder are grossly

unremarkable and the appendix is not abnormally thickened. There

is no pelvic mass or free fluid collection evident.



The images through the upper abdomen show that the stomach is

distended by gas, fluid, and particulate matter. This appearance

is similar to the previous CT chest exam of 07/04/2015. There is

no sign of a mass to cause a gastric outlet obstruction;

however, if further evaluation of the stomach is desired, then

an upper GI exam or endoscopy should be considered.



The liver, spleen, pancreas, kidneys, aorta, and inferior vena

cava show no sign of an acute abnormality. The gallbladder is

surgically absent. According to the patient's history, she has

also had a prior left adrenalectomy; however, on this exam, both

adrenal glands are visualized. Correlation with the patient's

surgical history would be recommended.



The lung bases are clear. The bone windows show no sign of a

fracture or of a destructive lesion.



IMPRESSION:

1. The evaluation of the pelvic contents is limited due to

streak artifact related to the total hip prostheses. There is no

acute pelvic abnormality visualized, however. In particular,

there is no sign of appendicitis.



2. The stomach is distended by fluid, particulate matter, and

gas. There is no sign of a mass to suggest a gastric outlet

obstruction but either an upper GI exam or endoscopy should be

considered for further evaluation.



3. There is no acute abnormality of the abdomen or pelvis noted

otherwise.



4. Both adrenal glands were visualized. Correlation with the

patient's surgical history would be recommended.



Dictated by:



Dictated on workstation # [id number]

## 2017-01-25 NOTE — PROGRESS NOTE (SOAP)
Subjective


Subjective/Events-last exam


cellulitis of both legs worse on the right.


Venous insufficiency.


Diabetes mellitus.


Wound culture preliminary of legs Enterococcus faecalis and Bacillus suspicious.


sensitive to vancomycin with enterococcus.


Patient complaining of right lower abdominal pain for the last few months 


Patient states pain is stabbing at times


Patient feels that cellulitis is improving


































































































 Bacillus species.


Sensitive to vancomycin which she is on





Objective


Exam





 Vital Signs








  Date Time  Temp Pulse Resp B/P Pulse Ox O2 Delivery O2 Flow Rate FiO2


 


17 03:34 97.3 76 16 128/66 96 Room Air  


 


17 23:46 96.6 68 12 121/67 98 Room Air  


 


17 21:00     93 Room Air  


 


17 20:30 97.7 71 22 132/71 98 Room Air  


 


17 15:55 97.5 75 22 136/63 100 Room Air  


 


17 12:00 97.2 75 18 135/63 96 Room Air  


 


17 09:00      Room Air  


 


17 08:00 95.9 79 18 126/60 97 Room Air  














 I & O 


 


 17





 07:00


 


Intake Total 1750 ml


 


Output Total 1325 ml


 


Balance 425 ml





Capillary Refill : Less Than 3 SecondsLess Than 3 Seconds


General Appearance:   No Apparent Distress WD/WN


HEENT:   Normal ENT Inspection


Neck:   Normal Inspection


Respiratory:   Chest Non Tender Lungs Clear Normal Breath Sounds No Accessory 

Muscle Use No Respiratory Distress


Cardiovascular:   Regular Rate, Rhythm


Gastrointestinal:   non tender soft


Extremity:   Other (cellulitis of legs worse on right)





Results


Lab


 Laboratory Tests


17 10:16: Glucometer 71


17 16:00: Glucometer 131H


17 18:36: Vancomycin Level Trough 14.7


17 21:08: Glucometer 153H


17 05:45: 


Alanine Aminotransferase (ALT/SGPT) 17, Albumin 3.4, Alkaline Phosphatase 90, 

Anion Gap 10, Aspartate Amino Transf (AST/SGOT) 14, BUN/Creatinine Ratio 25, 

Basophils # (Auto) 0.1, Basophils (%) (Auto) 2, Blood Urea Nitrogen 20H, 

Calcium Level 9.3, Carbon Dioxide Level 24, Chloride Level 105, Creatinine 0.81

, Eosinophils # (Auto) 0.5H, Eosinophils (%) (Auto) 8, Estimat Glomerular 

Filtration Rate > 60, Glucose Level 102, Hematocrit 35, Hemoglobin 11.2L, 

Lymphocytes # (Auto) 1.3, Lymphocytes (%) (Auto) 18, Mean Corpuscular 

Hemoglobin 27, Mean Corpuscular Hemoglobin Concent 32, Mean Corpuscular Volume 

83, Mean Platelet Volume 10.0, Monocytes # (Auto) 0.8, Monocytes (%) (Auto) 11, 

Neutrophils # (Auto) 4.4, Neutrophils (%) (Auto) 61, Platelet Count 254, 

Potassium Level 4.0, Red Blood Count 4.23L, Red Cell Distribution Width 14.8H, 

Sodium Level 139, Total Bilirubin 0.2, Total Protein 6.3L, White Blood Count 7.1


17 05:46: Glucometer 97





 Microbiology


17 Blood Culture - Preliminary, Resulted


          No growth


17 Urine Culture - Preliminary, Resulted


          


17 Gram Stain - Final, Resulted


          


17 Wound Culture - Preliminary, Resulted


          Enterococcus Faecalis


          Bacillus Species





Assessment/Plan


Assessment/Plan


Assess & Plan/Chief Complaint


cellulitis of both legs.


Wound culture preliminary report Enterococcus faecalis that was sensitive to 

vancomycin and bacillus species..


Right side of abdomen pain the last few months


Diagnosis/Problems:  





Clinical Quality Measures


DVT/VTE Risk/Contraindication:


Risk Factor Score Per Nursin


RFS Level Per Nursing on Admit:  4+=Very High


Contraindications-Mechi:  Other *list below*








LELAND DACOSTA DO 2017 07:28

## 2017-01-26 VITALS — DIASTOLIC BLOOD PRESSURE: 65 MMHG | SYSTOLIC BLOOD PRESSURE: 137 MMHG

## 2017-01-26 VITALS — SYSTOLIC BLOOD PRESSURE: 108 MMHG | DIASTOLIC BLOOD PRESSURE: 63 MMHG

## 2017-01-26 VITALS — SYSTOLIC BLOOD PRESSURE: 182 MMHG | DIASTOLIC BLOOD PRESSURE: 76 MMHG

## 2017-01-26 VITALS — DIASTOLIC BLOOD PRESSURE: 81 MMHG | SYSTOLIC BLOOD PRESSURE: 177 MMHG

## 2017-01-26 LAB
ANION GAP SERPL CALC-SCNC: 10 MMOL/L (ref 5–14)
BASOPHILS # BLD AUTO: 0.1 10^3/UL (ref 0–0.1)
BASOPHILS NFR BLD AUTO: 1 % (ref 0–10)
BUN SERPL-MCNC: 18 MG/DL (ref 7–18)
BUN/CREAT SERPL: 22
CALCIUM SERPL-MCNC: 9.7 MG/DL (ref 8.5–10.1)
CHLORIDE SERPL-SCNC: 103 MMOL/L (ref 98–107)
CO2 SERPL-SCNC: 26 MMOL/L (ref 21–32)
CREAT SERPL-MCNC: 0.81 MG/DL (ref 0.6–1.3)
EOSINOPHIL # BLD AUTO: 0.6 10^3/UL (ref 0–0.3)
EOSINOPHIL NFR BLD AUTO: 7 % (ref 0–10)
ERYTHROCYTE [DISTWIDTH] IN BLOOD BY AUTOMATED COUNT: 14.9 % (ref 10–14.5)
GFR SERPLBLD BASED ON 1.73 SQ M-ARVRAT: > 60 ML/MIN
GLUCOSE SERPL-MCNC: 114 MG/DL (ref 70–105)
LYMPHOCYTES # BLD AUTO: 1.3 X 10^3 (ref 1–4)
LYMPHOCYTES NFR BLD AUTO: 16 % (ref 12–44)
MCH RBC QN AUTO: 26 PG (ref 25–34)
MCHC RBC AUTO-ENTMCNC: 32 G/DL (ref 32–36)
MCV RBC AUTO: 83 FL (ref 80–99)
MONOCYTES # BLD AUTO: 0.9 X 10^3 (ref 0–1)
MONOCYTES NFR BLD AUTO: 11 % (ref 0–12)
NEUTROPHILS # BLD AUTO: 5.2 X 10^3 (ref 1.8–7.8)
NEUTROPHILS NFR BLD AUTO: 65 % (ref 42–75)
PLATELET # BLD: 273 10^3/UL (ref 130–400)
PMV BLD AUTO: 10.5 FL (ref 7.4–10.4)
POTASSIUM SERPL-SCNC: 4 MMOL/L (ref 3.6–5)
RBC # BLD AUTO: 4.63 10^6/UL (ref 4.35–5.85)
SODIUM SERPL-SCNC: 139 MMOL/L (ref 135–145)
WBC # BLD AUTO: 8.1 10^3/UL (ref 4.3–11)

## 2017-01-26 RX ADMIN — FUROSEMIDE SCH MG: 40 TABLET ORAL at 08:43

## 2017-01-26 RX ADMIN — DOCUSATE SODIUM SCH MG: 100 CAPSULE ORAL at 21:38

## 2017-01-26 RX ADMIN — MAGNESIUM HYDROXIDE PRN ML: 400 SUSPENSION ORAL at 08:43

## 2017-01-26 RX ADMIN — ALPRAZOLAM SCH MG: 0.25 TABLET ORAL at 21:39

## 2017-01-26 RX ADMIN — PREGABALIN SCH MG: 75 CAPSULE ORAL at 21:39

## 2017-01-26 RX ADMIN — PANTOPRAZOLE SODIUM SCH MG: 40 TABLET, DELAYED RELEASE ORAL at 06:10

## 2017-01-26 RX ADMIN — INSULIN HUMAN SCH UNIT: 100 INJECTION, SOLUTION PARENTERAL at 05:32

## 2017-01-26 RX ADMIN — VANCOMYCIN HYDROCHLORIDE SCH MLS/HR: 1 INJECTION, POWDER, LYOPHILIZED, FOR SOLUTION INTRAVENOUS at 06:08

## 2017-01-26 RX ADMIN — PREGABALIN SCH MG: 75 CAPSULE ORAL at 13:07

## 2017-01-26 RX ADMIN — INSULIN DETEMIR SCH UNIT: 100 INJECTION, SOLUTION SUBCUTANEOUS at 21:39

## 2017-01-26 RX ADMIN — MICONAZOLE NITRATE SCH APPLIC: 20 POWDER TOPICAL at 21:48

## 2017-01-26 RX ADMIN — DULOXETINE HYDROCHLORIDE SCH MG: 30 CAPSULE, DELAYED RELEASE ORAL at 08:43

## 2017-01-26 RX ADMIN — INSULIN HUMAN SCH UNIT: 100 INJECTION, SOLUTION PARENTERAL at 21:48

## 2017-01-26 RX ADMIN — INSULIN HUMAN SCH UNIT: 100 INJECTION, SOLUTION PARENTERAL at 10:44

## 2017-01-26 RX ADMIN — ROSUVASTATIN CALCIUM SCH MG: 5 TABLET, FILM COATED ORAL at 21:39

## 2017-01-26 RX ADMIN — VANCOMYCIN HYDROCHLORIDE SCH MLS/HR: 1 INJECTION, POWDER, LYOPHILIZED, FOR SOLUTION INTRAVENOUS at 21:20

## 2017-01-26 RX ADMIN — INSULIN DETEMIR SCH UNIT: 100 INJECTION, SOLUTION SUBCUTANEOUS at 08:43

## 2017-01-26 RX ADMIN — DOCUSATE SODIUM AND SENNOSIDES SCH EA: 8.6; 5 TABLET, FILM COATED ORAL at 21:38

## 2017-01-26 RX ADMIN — ROPINIROLE SCH MG: 1 TABLET ORAL at 08:43

## 2017-01-26 RX ADMIN — MICONAZOLE NITRATE SCH APPLIC: 20 POWDER TOPICAL at 08:44

## 2017-01-26 RX ADMIN — Medication SCH ML: at 13:07

## 2017-01-26 RX ADMIN — ENOXAPARIN SODIUM SCH MG: 100 INJECTION SUBCUTANEOUS at 08:42

## 2017-01-26 RX ADMIN — DOCUSATE SODIUM SCH MG: 100 CAPSULE ORAL at 08:43

## 2017-01-26 RX ADMIN — ROPINIROLE SCH MG: 1 TABLET ORAL at 21:39

## 2017-01-26 RX ADMIN — Medication SCH ML: at 06:10

## 2017-01-26 RX ADMIN — PREGABALIN SCH MG: 75 CAPSULE ORAL at 08:43

## 2017-01-26 RX ADMIN — ALPRAZOLAM SCH MG: 0.25 TABLET ORAL at 08:43

## 2017-01-26 RX ADMIN — INSULIN HUMAN SCH UNIT: 100 INJECTION, SOLUTION PARENTERAL at 16:17

## 2017-01-26 RX ADMIN — Medication SCH ML: at 21:48

## 2017-01-26 NOTE — PROGRESS NOTE (SOAP)
Subjective


Subjective/Events-last exam


patient feeling better today.


Arthritis of legs are better.


Diabetes okay.


Patient voices no complaints.


Patient sitting in chair happy this morning





Objective


Exam





 Vital Signs








  Date Time  Temp Pulse Resp B/P Pulse Ox O2 Delivery O2 Flow Rate FiO2


 


17 08:53 97.5 68 12 177/81 100 Room Air  


 


17 08:51      Room Air  


 


17 00:40 96.4 72 20 108/63 97 Room Air  


 


17 16:00 96.9 71 22 143/80 99 Room Air  














 I & O 


 


 17





 07:00


 


Intake Total 2600 ml


 


Output Total 1700 ml


 


Balance 900 ml





Capillary Refill : Less Than 3 SecondsLess Than 3 Seconds


General Appearance:   No Apparent Distress WD/WN


HEENT:   TMs Normal Normal ENT Inspection


Neck:   Normal Inspection Non Tender


Respiratory:   Chest Non Tender Lungs Clear Normal Breath Sounds No Accessory 

Muscle Use No Respiratory Distress


Cardiovascular:   Regular Rate, Rhythm No Murmur


Gastrointestinal:   non tender soft





Results


Lab


Laboratory Tests


17 05:30








 Laboratory Tests


17 12:12: Glucometer 87


17 16:03: Glucometer 129H


17 18:37: Vancomycin Level Trough 19.4


17 21:02: Glucometer 184H


17 05:29: Glucometer 113H


17 05:30: 


Anion Gap 10, BUN/Creatinine Ratio 22, Basophils # (Auto) 0.1, Basophils (%) (

Auto) 1, Blood Urea Nitrogen 18, Calcium Level 9.7, Carbon Dioxide Level 26, 

Chloride Level 103, Creatinine 0.81, Eosinophils # (Auto) 0.6H, Eosinophils (%) 

(Auto) 7, Estimat Glomerular Filtration Rate > 60, Glucose Level 114H, 

Hematocrit 38, Hemoglobin 12.2, Lymphocytes # (Auto) 1.3, Lymphocytes (%) (Auto

) 16, Mean Corpuscular Hemoglobin 26, Mean Corpuscular Hemoglobin Concent 32, 

Mean Corpuscular Volume 83, Mean Platelet Volume 10.5H, Monocytes # (Auto) 0.9, 

Monocytes (%) (Auto) 11, Neutrophils # (Auto) 5.2, Neutrophils (%) (Auto) 65, 

Platelet Count 273, Potassium Level 4.0, Red Blood Count 4.63, Red Cell 

Distribution Width 14.9H, Sodium Level 139, White Blood Count 8.1





 Microbiology


17 Blood Culture - Preliminary, Resulted


          No growth


17 Urine Culture - Final, Complete


          


17 Gram Stain - Final, Resulted


          


17 Wound Culture - Preliminary, Resulted


          Enterococcus Faecalis


          Bacillus Species


          Staph, Coag Neg (Cns)





Assessment/Plan


Assessment/Plan


Assess & Plan/Chief Complaint


abdominal paincellulitis of both legs.


Wound culture preliminary report Enterococcus faecalis that was sensitive to 

vancomycin and bacillus species..


Right side of abdomen pain the last few months.


.


2617 cellulitis of both legs were sunlight.


Diabetic ulcer.


Legs are doing better.


Diabetes.


Abdominal pain right side


Diagnosis/Problems:  





Clinical Quality Measures


DVT/VTE Risk/Contraindication:


Risk Factor Score Per Nursin


RFS Level Per Nursing on Admit:  4+=Very High


Contraindications-Mechi:  Other *list below*








LELAND DACOSTA DO 2017 09:33

## 2017-01-27 VITALS — DIASTOLIC BLOOD PRESSURE: 65 MMHG | SYSTOLIC BLOOD PRESSURE: 138 MMHG

## 2017-01-27 VITALS — SYSTOLIC BLOOD PRESSURE: 143 MMHG | DIASTOLIC BLOOD PRESSURE: 66 MMHG

## 2017-01-27 VITALS — SYSTOLIC BLOOD PRESSURE: 114 MMHG | DIASTOLIC BLOOD PRESSURE: 56 MMHG

## 2017-01-27 LAB
BASOPHILS # BLD AUTO: 0.1 10^3/UL (ref 0–0.1)
BASOPHILS NFR BLD AUTO: 1 % (ref 0–10)
EOSINOPHIL # BLD AUTO: 0.7 10^3/UL (ref 0–0.3)
EOSINOPHIL NFR BLD AUTO: 8 % (ref 0–10)
ERYTHROCYTE [DISTWIDTH] IN BLOOD BY AUTOMATED COUNT: 14.9 % (ref 10–14.5)
LYMPHOCYTES # BLD AUTO: 1.3 X 10^3 (ref 1–4)
LYMPHOCYTES NFR BLD AUTO: 15 % (ref 12–44)
MCH RBC QN AUTO: 27 PG (ref 25–34)
MCHC RBC AUTO-ENTMCNC: 32 G/DL (ref 32–36)
MCV RBC AUTO: 83 FL (ref 80–99)
MONOCYTES # BLD AUTO: 0.9 X 10^3 (ref 0–1)
MONOCYTES NFR BLD AUTO: 10 % (ref 0–12)
NEUTROPHILS # BLD AUTO: 5.7 X 10^3 (ref 1.8–7.8)
NEUTROPHILS NFR BLD AUTO: 66 % (ref 42–75)
PLATELET # BLD: 280 10^3/UL (ref 130–400)
PMV BLD AUTO: 10.7 FL (ref 7.4–10.4)
RBC # BLD AUTO: 4.35 10^6/UL (ref 4.35–5.85)
WBC # BLD AUTO: 8.7 10^3/UL (ref 4.3–11)

## 2017-01-27 RX ADMIN — INSULIN HUMAN SCH UNIT: 100 INJECTION, SOLUTION PARENTERAL at 10:47

## 2017-01-27 RX ADMIN — FUROSEMIDE SCH MG: 40 TABLET ORAL at 07:47

## 2017-01-27 RX ADMIN — DULOXETINE HYDROCHLORIDE SCH MG: 30 CAPSULE, DELAYED RELEASE ORAL at 07:47

## 2017-01-27 RX ADMIN — DOCUSATE SODIUM SCH MG: 100 CAPSULE ORAL at 07:47

## 2017-01-27 RX ADMIN — Medication SCH ML: at 06:32

## 2017-01-27 RX ADMIN — PREGABALIN SCH MG: 75 CAPSULE ORAL at 07:48

## 2017-01-27 RX ADMIN — ALPRAZOLAM SCH MG: 0.25 TABLET ORAL at 07:48

## 2017-01-27 RX ADMIN — VANCOMYCIN HYDROCHLORIDE SCH MLS/HR: 1 INJECTION, POWDER, LYOPHILIZED, FOR SOLUTION INTRAVENOUS at 06:33

## 2017-01-27 RX ADMIN — MICONAZOLE NITRATE SCH APPLIC: 20 POWDER TOPICAL at 07:46

## 2017-01-27 RX ADMIN — DOCUSATE SODIUM AND SENNOSIDES SCH EA: 8.6; 5 TABLET, FILM COATED ORAL at 07:47

## 2017-01-27 RX ADMIN — INSULIN HUMAN SCH UNIT: 100 INJECTION, SOLUTION PARENTERAL at 06:00

## 2017-01-27 RX ADMIN — INSULIN HUMAN SCH UNIT: 100 INJECTION, SOLUTION PARENTERAL at 18:09

## 2017-01-27 RX ADMIN — INSULIN DETEMIR SCH UNIT: 100 INJECTION, SOLUTION SUBCUTANEOUS at 07:47

## 2017-01-27 RX ADMIN — Medication SCH ML: at 13:51

## 2017-01-27 RX ADMIN — PANTOPRAZOLE SODIUM SCH MG: 40 TABLET, DELAYED RELEASE ORAL at 06:32

## 2017-01-27 RX ADMIN — PREGABALIN SCH MG: 75 CAPSULE ORAL at 13:51

## 2017-01-27 RX ADMIN — ENOXAPARIN SODIUM SCH MG: 100 INJECTION SUBCUTANEOUS at 07:47

## 2017-01-27 RX ADMIN — ROPINIROLE SCH MG: 1 TABLET ORAL at 07:47

## 2017-01-27 NOTE — DISCHARGE INST-SIMPLE/STANDARD
Discharge Inst-Standard


Discharge Medications


New, Converted or Re-Newed RX:  Transmitted to Pharmacy





Patient Instructions/Follow Up


Plan of Care/Instructions/FU:  


2 office in one week.


Patient get in touch with caregiver when gets home.


Augmentin 875 one twice a day


Activity as Tolerated:  Yes


Discharge Diet:  ADA Diet








LELAND DACOSTA DO Jan 27, 2017 07:56

## 2017-01-27 NOTE — PROGRESS NOTE (SOAP)
Subjective


Subjective/Events-last exam


patient feeling better today.


Legs look better.


Plan to discharge today.


Patient have vaginal itching to put on Diflucan.


2 office in one week.


Sent home on Augmentin





Objective


Exam





 Vital Signs








  Date Time  Temp Pulse Resp B/P Pulse Ox O2 Delivery O2 Flow Rate FiO2


 


17 00:31 97.4 87 16 114/56 97 Room Air  


 


17 16:00 96.7 68 16 137/65 98 Room Air  


 


17 12:58 97.6 80 18 182/76 97 Room Air  


 


17 08:53 97.5 68 12 177/81 100 Room Air  


 


17 08:51      Room Air  














 I & O 


 


 17





 07:00


 


Intake Total 1810 ml


 


Output Total 2550 ml


 


Balance -740 ml





Capillary Refill : Less Than 3 SecondsLess Than 3 Seconds


General Appearance:   No Apparent Distress WD/WN


HEENT:   Normal ENT Inspection


Neck:   Normal Inspection


Respiratory:   No Accessory Muscle Use No Respiratory Distress


Cardiovascular:   Regular Rate, Rhythm





Results


Lab


Laboratory Tests


17 03:55








 Laboratory Tests


17 10:26: Glucometer 162H


17 16:04: Glucometer 161H


17 20:20: Glucometer 184H


17 03:55: 


Basophils # (Auto) 0.1, Basophils (%) (Auto) 1, Eosinophils # (Auto) 0.7H, 

Eosinophils (%) (Auto) 8, Hematocrit 36, Hemoglobin 11.6, Lymphocytes # (Auto) 

1.3, Lymphocytes (%) (Auto) 15, Mean Corpuscular Hemoglobin 27, Mean 

Corpuscular Hemoglobin Concent 32, Mean Corpuscular Volume 83, Mean Platelet 

Volume 10.7H, Monocytes # (Auto) 0.9, Monocytes (%) (Auto) 10, Neutrophils # (

Auto) 5.7, Neutrophils (%) (Auto) 66, Platelet Count 280, Red Blood Count 4.35, 

Red Cell Distribution Width 14.9H, White Blood Count 8.7


17 06:02: Glucometer 88





 Microbiology


17 Blood Culture - Preliminary, Resulted


          No growth


17 Urine Culture - Final, Complete


          


17 Gram Stain - Final, Resulted


          


17 Wound Culture - Preliminary, Resulted


          Enterococcus Faecalis


          Serratia Plymuthica


          Bacillus Species


          Staph, Coag Neg (Cns)





Assessment/Plan


Assessment/Plan


Assess & Plan/Chief Complaint


abdominal paincellulitis of both legs.


Wound culture preliminary report Enterococcus faecalis that was sensitive to 

vancomycin and bacillus species..


Right side of abdomen pain the last few months.


.


2617 cellulitis of both legs were sunlight.


Diabetic ulcer.


Legs are doing better.


Diabetes.


Abdominal pain right side.


.


17.


Patient to be discharged today.


Cellulitis of both legs much better.


Diabetic ulcer with crust better.


Diabetes.


To be sent home on Augmentin 875


Diagnosis/Problems:  





Clinical Quality Measures


DVT/VTE Risk/Contraindication:


Risk Factor Score Per Nursin


RFS Level Per Nursing on Admit:  4+=Very High


Contraindications-Mechi:  Other *list below*








LELAND DACOSTA DO 2017 07:52

## 2017-01-27 NOTE — WOUND CARE PROGRESS NOTE
Subjective


Subjective


Subjective/Events-last exam


67 year old female with venous stasis of BLE.  Legs are markedly improved with 

elevation.  jThe patient is urged to continue post discharge.  Will follow in 

clinic.





PMFSH:  No interval change.





Review of Systems


Pulmonary:  No Dyspnea


Cardiovascular:  No: Chest Pain





Objective


Exam


Last Set of Vital Signs





Vital Signs








  Date Time  Temp Pulse Resp B/P Pulse Ox O2 Delivery O2 Flow Rate FiO2


 


1/26/17 16:00 96.7 68 16 137/65 98 Room Air  





Capillary Refill : Less Than 3 SecondsLess Than 3 Seconds


I&O











 Intake and Output 


 


 1/27/17





 00:00


 


Intake Total 2020 ml


 


Output Total 3200 ml


 


Balance -1180 ml


 


 


 


Intake Oral 1770 ml


 


IV Total 250 ml


 


Output Urine Total 3200 ml








General:  Alert, No Acute Distress


Lungs:  Normal Air Movement


Extremities:  Other (Markedly less edema and redness.)





Results


Lab


 Laboratory Tests


1/26/17 05:29: Glucometer 113H


1/26/17 05:30: 


Anion Gap 10, BUN/Creatinine Ratio 22, Basophils # (Auto) 0.1, Basophils (%) (

Auto) 1, Blood Urea Nitrogen 18, Calcium Level 9.7, Carbon Dioxide Level 26, 

Chloride Level 103, Creatinine 0.81, Eosinophils # (Auto) 0.6H, Eosinophils (%) 

(Auto) 7, Estimat Glomerular Filtration Rate > 60, Glucose Level 114H, 

Hematocrit 38, Hemoglobin 12.2, Lymphocytes # (Auto) 1.3, Lymphocytes (%) (Auto

) 16, Mean Corpuscular Hemoglobin 26, Mean Corpuscular Hemoglobin Concent 32, 

Mean Corpuscular Volume 83, Mean Platelet Volume 10.5H, Monocytes # (Auto) 0.9, 

Monocytes (%) (Auto) 11, Neutrophils # (Auto) 5.2, Neutrophils (%) (Auto) 65, 

Platelet Count 273, Potassium Level 4.0, Red Blood Count 4.63, Red Cell 

Distribution Width 14.9H, Sodium Level 139, White Blood Count 8.1


1/26/17 10:26: Glucometer 162H


1/26/17 16:04: Glucometer 161H


1/26/17 20:20: Glucometer 184H





 Microbiology


1/23/17 Blood Culture - Preliminary, Resulted


          No growth


1/23/17 Urine Culture - Final, Complete


          


1/23/17 Gram Stain - Final, Resulted


          


1/23/17 Wound Culture - Preliminary, Resulted


          Enterococcus Faecalis


          Gram Negative Geovanni


          Bacillus Species


          Staph, Coag Neg (Cns)





Assessment/Plan


1. BLE cellulitis





2. Venous insufficiency.   


 


3.  Diabetes with venous disease.





Plan:  continue elevation and local care.








ROSHNI MOREIRA MD Jan 27, 2017 00:03

## 2017-02-02 NOTE — CLINIC ACCOUNT PROGRESS/DX
Clinic Account Progress/Dx


DIAGNOSIS:


Diagnosis


cellulitis of right lower limb.


Enterococcus is the cause of diseases classified.


Type II diabetes.


Non -pressure chronic ulcer of right calf.


Venous insufficiency.


Polyneuropathy.


Essential hypertension








LELAND DACOSTA DO Feb 2, 2017 07:22

## 2017-02-03 ENCOUNTER — HOSPITAL ENCOUNTER (OUTPATIENT)
Dept: HOSPITAL 75 - LAB | Age: 67
End: 2017-02-03
Attending: SURGERY
Payer: MEDICARE

## 2017-02-03 DIAGNOSIS — E11.622: ICD-10-CM

## 2017-02-03 DIAGNOSIS — L97.211: Primary | ICD-10-CM

## 2017-02-03 DIAGNOSIS — I89.0: ICD-10-CM

## 2017-02-03 DIAGNOSIS — I87.331: ICD-10-CM

## 2017-02-03 LAB
ALBUMIN SERPL-MCNC: 3.7 G/DL (ref 3.2–4.5)
ALT SERPL-CCNC: 19 U/L (ref 0–55)
ANION GAP SERPL CALC-SCNC: 9 MMOL/L (ref 5–14)
AST SERPL-CCNC: 17 U/L (ref 5–34)
BILIRUB SERPL-MCNC: 0.2 MG/DL (ref 0.1–1)
BUN SERPL-MCNC: 18 MG/DL (ref 7–18)
BUN/CREAT SERPL: 23
CALCIUM SERPL-MCNC: 9.5 MG/DL (ref 8.5–10.1)
CHLORIDE SERPL-SCNC: 105 MMOL/L (ref 98–107)
CO2 SERPL-SCNC: 26 MMOL/L (ref 21–32)
CREAT SERPL-MCNC: 0.8 MG/DL (ref 0.6–1.3)
GFR SERPLBLD BASED ON 1.73 SQ M-ARVRAT: > 60 ML/MIN
GLUCOSE SERPL-MCNC: 106 MG/DL (ref 70–105)
POTASSIUM SERPL-SCNC: 4.7 MMOL/L (ref 3.6–5)
PROT SERPL-MCNC: 6.7 G/DL (ref 6.4–8.2)
SODIUM SERPL-SCNC: 140 MMOL/L (ref 135–145)

## 2017-02-03 PROCEDURE — 80053 COMPREHEN METABOLIC PANEL: CPT

## 2017-02-03 PROCEDURE — 36415 COLL VENOUS BLD VENIPUNCTURE: CPT

## 2017-02-05 ENCOUNTER — HOSPITAL ENCOUNTER (OUTPATIENT)
Dept: HOSPITAL 75 - LABNPT | Age: 67
End: 2017-02-05
Attending: SURGERY
Payer: MEDICARE

## 2017-02-05 DIAGNOSIS — L03.90: Primary | ICD-10-CM

## 2017-02-05 LAB
ALBUMIN SERPL-MCNC: 3.7 G/DL (ref 3.2–4.5)
ALT SERPL-CCNC: 15 U/L (ref 0–55)
ANION GAP SERPL CALC-SCNC: 11 MMOL/L (ref 5–14)
AST SERPL-CCNC: 15 U/L (ref 5–34)
BILIRUB SERPL-MCNC: 0.3 MG/DL (ref 0.1–1)
BUN SERPL-MCNC: 16 MG/DL (ref 7–18)
BUN/CREAT SERPL: 22
CALCIUM SERPL-MCNC: 9.5 MG/DL (ref 8.5–10.1)
CHLORIDE SERPL-SCNC: 102 MMOL/L (ref 98–107)
CO2 SERPL-SCNC: 25 MMOL/L (ref 21–32)
CREAT SERPL-MCNC: 0.72 MG/DL (ref 0.6–1.3)
GFR SERPLBLD BASED ON 1.73 SQ M-ARVRAT: > 60 ML/MIN
GLUCOSE SERPL-MCNC: 133 MG/DL (ref 70–105)
POTASSIUM SERPL-SCNC: 5 MMOL/L (ref 3.6–5)
PROT SERPL-MCNC: 6.5 G/DL (ref 6.4–8.2)
SODIUM SERPL-SCNC: 138 MMOL/L (ref 135–145)

## 2017-02-05 PROCEDURE — 80053 COMPREHEN METABOLIC PANEL: CPT

## 2017-02-15 ENCOUNTER — HOSPITAL ENCOUNTER (OUTPATIENT)
Dept: HOSPITAL 75 - WOUNDCARE | Age: 67
Discharge: HOME | End: 2017-02-15
Attending: SURGERY
Payer: MEDICARE

## 2017-02-15 DIAGNOSIS — L97.211: Primary | ICD-10-CM

## 2017-02-15 DIAGNOSIS — I87.331: ICD-10-CM

## 2017-02-15 DIAGNOSIS — E11.622: ICD-10-CM

## 2017-02-15 DIAGNOSIS — I89.0: ICD-10-CM

## 2017-02-15 PROCEDURE — 80053 COMPREHEN METABOLIC PANEL: CPT

## 2017-02-15 PROCEDURE — 99212 OFFICE O/P EST SF 10 MIN: CPT

## 2017-02-15 PROCEDURE — 97597 DBRDMT OPN WND 1ST 20 CM/<: CPT

## 2017-02-15 PROCEDURE — 36415 COLL VENOUS BLD VENIPUNCTURE: CPT

## 2017-03-07 ENCOUNTER — HOSPITAL ENCOUNTER (EMERGENCY)
Dept: HOSPITAL 75 - ER | Age: 67
Discharge: HOME | End: 2017-03-07
Payer: MEDICARE

## 2017-03-07 VITALS — SYSTOLIC BLOOD PRESSURE: 115 MMHG | DIASTOLIC BLOOD PRESSURE: 49 MMHG

## 2017-03-07 VITALS — BODY MASS INDEX: 36.36 KG/M2 | WEIGHT: 226.25 LBS | HEIGHT: 66 IN

## 2017-03-07 DIAGNOSIS — L03.115: Primary | ICD-10-CM

## 2017-03-07 DIAGNOSIS — Z96.642: ICD-10-CM

## 2017-03-07 DIAGNOSIS — L03.116: ICD-10-CM

## 2017-03-07 DIAGNOSIS — Z87.891: ICD-10-CM

## 2017-03-07 DIAGNOSIS — Z79.84: ICD-10-CM

## 2017-03-07 DIAGNOSIS — E11.9: ICD-10-CM

## 2017-03-07 DIAGNOSIS — R60.0: ICD-10-CM

## 2017-03-07 DIAGNOSIS — Z79.4: ICD-10-CM

## 2017-03-07 DIAGNOSIS — Z79.899: ICD-10-CM

## 2017-03-07 LAB
ALBUMIN SERPL-MCNC: 3.6 G/DL (ref 3.2–4.5)
ALT SERPL-CCNC: 14 U/L (ref 0–55)
ANION GAP SERPL CALC-SCNC: 10 MMOL/L (ref 5–14)
AST SERPL-CCNC: 13 U/L (ref 5–34)
BASOPHILS # BLD AUTO: 0.1 10^3/UL (ref 0–0.1)
BASOPHILS NFR BLD AUTO: 2 % (ref 0–10)
BILIRUB SERPL-MCNC: 0.2 MG/DL (ref 0.1–1)
BUN SERPL-MCNC: 16 MG/DL (ref 7–18)
BUN/CREAT SERPL: 19
CALCIUM SERPL-MCNC: 9.6 MG/DL (ref 8.5–10.1)
CHLORIDE SERPL-SCNC: 104 MMOL/L (ref 98–107)
CO2 SERPL-SCNC: 27 MMOL/L (ref 21–32)
CREAT SERPL-MCNC: 0.85 MG/DL (ref 0.6–1.3)
EOSINOPHIL # BLD AUTO: 0.7 10^3/UL (ref 0–0.3)
EOSINOPHIL NFR BLD AUTO: 8 % (ref 0–10)
ERYTHROCYTE [DISTWIDTH] IN BLOOD BY AUTOMATED COUNT: 14.7 % (ref 10–14.5)
GFR SERPLBLD BASED ON 1.73 SQ M-ARVRAT: > 60 ML/MIN
GLUCOSE SERPL-MCNC: 186 MG/DL (ref 70–105)
HS C REACTIVE PROTEIN: 0.15 MG/DL (ref 0–0.5)
LYMPHOCYTES # BLD AUTO: 1.5 X 10^3 (ref 1–4)
LYMPHOCYTES NFR BLD AUTO: 17 % (ref 12–44)
MCH RBC QN AUTO: 27 PG (ref 25–34)
MCHC RBC AUTO-ENTMCNC: 32 G/DL (ref 32–36)
MCV RBC AUTO: 83 FL (ref 80–99)
MONOCYTES # BLD AUTO: 1 X 10^3 (ref 0–1)
MONOCYTES NFR BLD AUTO: 12 % (ref 0–12)
NEUTROPHILS # BLD AUTO: 5.4 X 10^3 (ref 1.8–7.8)
NEUTROPHILS NFR BLD AUTO: 62 % (ref 42–75)
PLATELET # BLD: 233 10^3/UL (ref 130–400)
PMV BLD AUTO: 10.2 FL (ref 7.4–10.4)
POTASSIUM SERPL-SCNC: 3.8 MMOL/L (ref 3.6–5)
PROT SERPL-MCNC: 6.7 G/DL (ref 6.4–8.2)
RBC # BLD AUTO: 4.08 10^6/UL (ref 4.35–5.85)
SODIUM SERPL-SCNC: 141 MMOL/L (ref 135–145)
WBC # BLD AUTO: 8.7 10^3/UL (ref 4.3–11)

## 2017-03-07 PROCEDURE — 83880 ASSAY OF NATRIURETIC PEPTIDE: CPT

## 2017-03-07 PROCEDURE — 36415 COLL VENOUS BLD VENIPUNCTURE: CPT

## 2017-03-07 PROCEDURE — 85025 COMPLETE CBC W/AUTO DIFF WBC: CPT

## 2017-03-07 PROCEDURE — 96365 THER/PROPH/DIAG IV INF INIT: CPT

## 2017-03-07 PROCEDURE — 85379 FIBRIN DEGRADATION QUANT: CPT

## 2017-03-07 PROCEDURE — 71010: CPT

## 2017-03-07 PROCEDURE — 93925 LOWER EXTREMITY STUDY: CPT

## 2017-03-07 PROCEDURE — 93970 EXTREMITY STUDY: CPT

## 2017-03-07 PROCEDURE — 86141 C-REACTIVE PROTEIN HS: CPT

## 2017-03-07 PROCEDURE — 80053 COMPREHEN METABOLIC PANEL: CPT

## 2017-03-07 NOTE — DIAGNOSTIC IMAGING REPORT
INDICATION: Bilateral leg swelling.



Comparison with 9/24/2015.



FINDINGS: There is cardiomegaly again noted. There is no

evidence of pulmonary edema. The lungs are well-aerated and free

of infiltrates. There is no hilar adenopathy. No pneumothorax or

pleural effusions.



IMPRESSION: Cardiomegaly with no acute changes demonstrated.



Dictated by:



Dictated on workstation # [id number]

## 2017-03-07 NOTE — DIAGNOSTIC IMAGING REPORT
EXAM: US VENOUS LOWER EXT ANDERS



INDICATION: Bilateral lower extremity pain and swelling.



COMPARISON: Left lower extremity venous Doppler 1/23/2017.



FINDINGS: The bilateral common femoral vein, superficial femoral

vein, profunda femoris, and popliteal veins are normal.  These

vessels show normal compressibility, color flow, and doppler

augmentation.  The deep calf veins, although not very well seen,

demonstrate no distinct intraluminal thrombus.



IMPRESSION:  Negative venous Doppler of the bilateral lower

extremities.



Dictated by:



Dictated on workstation # [id number]

## 2017-03-07 NOTE — DIAGNOSTIC IMAGING REPORT
PROCEDURE: US Bilateral lower extremity arterial.



TECHNIQUE:   Multiple real-time grayscale images are obtained

through both lower extremity arterial systems with color Doppler

imaging and color Doppler spectral analysis.



INDICATION: Bilateral leg pain and swelling.



COMPARISON: None.



FINDINGS: Examination mildly limited by technical factors. The

dorsal pedis arteries are nonvisualized bilaterally due to lower

extremity edema. There is no significantly elevated velocities

within the bilateral common femoral, superficial femoral,

popliteal or posterior tibial arteries. The anterior tibial

arteries are not seen. Most of the waveforms demonstrate normal

triphasic waveforms with the exception of the right popliteal,

left distal superficial and bilateral posterior tibial arteries

which are more biphasic.



IMPRESSION:

No high-grade arterial stenosis involving the arterial systems

of the bilateral lower extremities. The exam is mildly limited

by technical factors including swelling in the lower

extremities.



Dictated by:



Dictated on workstation # [id number]

## 2017-03-07 NOTE — ED GENERAL
General


Chief Complaint:  Lower Extremity


Stated Complaint:  LEG PAIN,SWELLNG BILAT


Nursing Triage Note:  


PT STATES BI-LAT LOWER EXTREMITY PAIN AND SWELLING.  PT STATES DR. BELTRAN'S 


OFFICE WANTED HER TO COME HERE TO SEE IF SHE HAD ANY BLOOD CLOTS AND ADMIT HER 


FOR IV ABX.


Nursing Sepsis Screen:  No Definite Risk


Source of Information:  Patient, Family, Old Records


Exam Limitations:  No Limitations





History of Present Illness


Time Seen by Provider:  18:12


Initial Comments


This 67-year-old diabetic patient presents to the emergency room accompanied by 

her sister who helps care for her.  She has complaints of increasing edema, 

erythema, warmth, and pain in the lower extremities below the knee bilaterally 

for about one week.  She has a history of lower extremity cellulitis.  She sees 

Dr. Mcnally for wound care.  She has associated skin disruption with blistering 

and sores.  The left lower extremity is more severe than the right.  It is 

typical for the left extremity to be more swollen.  She is an insulin dependent 

diabetic.  No fevers or tachycardia.  Blood sugars have been a little bit 

higher the past couple of days than usual.  She is not good about checking her 

blood sugars.  Dr. Dacosta's her primary care provider.





Allergies and Home Medications


Allergies


Coded Allergies:  


     No Known Drug Allergies (Unverified , 3/11/14)





Home Medications


Acetaminophen 650 Mg Tablet.er 650-1,200 MG PO Q4H PRN PRN PAIN (Reported) 


Alprazolam 0.25 Mg Tablet 0.25 MG PO BID PRN PRN ANXIETY (Reported) 


Amoxicillin/Potassium Clav 1 Each Tablet #14 1 EACH PO BID 


   Prescribed by: LELAND DACOSTA on 1/27/17 0754


Calcium Carbonate 200 Mg Tab.chew 400 MG PO QID PRN PRN INDIGESTION (Reported) 


Cephalexin 500 Mg Capsule #40 500 MG PO QID 


   Prescribed by: KARMEN CREWS on 3/7/17 2128


Duloxetine HCl 60 Mg Capsule.dr 30 MG PO DAILY (Reported) 


   LAST FILLED #30 12-6-16 


Esomeprazole Magnesium 40 Mg Cap 40 MG PO DAILY (Reported) 


   LAST FILLED #90 10-18-16 


Furosemide 40 Mg Tablet 40 MG PO DAILY (Reported) 


Ibuprofen 200 Mg Tablet 600 MG PO TID PRN PRN PAIN (Reported) 


Insulin Aspart 300 Units/3 Ml Solution SQ QID (Reported) 


    = 0 UNITS 201-250 = 3 UNITS 251-300 = 5 UNITS 301-350 = 7 UNITS 351-

400 = 9 UNITS 


Insulin Glargine,Hum.rec.anlog 100 Unit/1 Ml Insuln.pen 40 UNITS SQ BID (

Reported) 


   HOLD IF BLOOD SUGAR IS 95 OR BELOW 


Iron Polysaccharide Complex 150 Mg Capsule 150 MG PO BID (Reported) 


Loperamide HCl 2 Mg Tablet 2 MG PO UD PRN PRN DIARRHEA (Reported) 


Loratadine 10 Mg Tablet 10 MG PO DAILY (Reported) 


Metformin HCl 1,000 Mg Tablet 1,000 MG PO BID (Reported) 


   LAST FILLED #180 8-9-16 


Polyethylene Glycol 3350 17 Gm Powd.pack 17 GM PO DAILY PRN PRN CONSTIPATION (

Reported) 


Pregabalin 150 Mg Capsule 150 MG PO TID (Reported) 


   LAST FILLED #90 12-14-16 


Ropinirole HCl 2 Mg Tablet 2 MG PO BID (Reported) 


   LAST FILLED #60 11-8-16 


Rosuvastatin Calcium 10 Mg Tablet 10 MG PO DAILY (Reported) 


Tolnaftate 45 Gm Powder TP DAILY (Reported) 


Tramadol HCl 50 Mg Tablet 50 MG PO TID (Reported) 


   LAST FILLED #63 12-29-16 





Constitutional:   no symptoms reported


EENTM:   no symptoms reported


Respiratory:   no symptoms reported


Cardiovascular:   no symptoms reported


Gastrointestinal:   no symptoms reported


Genitourinary:   no symptoms reported


Musculoskeletal:   see HPI


Skin:   see HPI


Psychiatric/Neurological:   No Symptoms Reported


Hematologic/Lymphatic:   No Symptoms Reported





Past Medical-Social-Family Hx


Patient Social History


Alcohol Use:  Denies Use


Recreational Drug Use:  No


Smoking Status:  Former Smoker


Type Used:  Cigarettes


Former Smoker/When Quit:  Feb 5, 1992


Recent Foreign Travel:  No


Contact w/Someone Who Travel:  No


Recent Infectious Disease Expo:  No


Recent Hopitalizations:  Yes (JAN 2017-CELLULITIS)





Immunizations Up To Date


Tetanus Booster (TDap):  Less than 5yrs


PED Vaccines UTD:  Yes


Date of Pneumonia Vaccine:  Sep 12, 2012


Date of Influenza Vaccine:  Sep 18, 2016





Seasonal Allergies


Seasonal Allergies:  No





Surgeries


HX Surgeries:  Yes (HEMORRHOIDECTOMY, L HIP replacement, reduction left hip 

dislocation x2)


Surgeries:  Gallbladder, Joint Replacement (hips), Orthopedic





Respiratory


Hx Respiratory Disorders:  No





Cardiovascular


Hx Cardiac Disorders:  Yes (CHF)


Cardiac Disorders:  Hypertension





Neurological


Hx Neurological Disorders:  Yes (RLS)


Neurological Disorders:  Neuropathy





Reproductive System


Hx Reproductive Disorders:  No


Sexually Transmitted Disease:  No


HIV/AIDS:  No





Genitourinary


Hx Genitourinary Disorders:  No





Gastrointestinal


Hx Gastrointestinal Disorders:  Yes


Gastrointestinal Disorders:  Chronic Constipation, Hemorrhoids





Musculoskeletal


Hx Musculoskeletal Disorders:  Yes (school age /  accident)


Musculoskeletal Disorders:  Arthritis, Back Injury, Chronic Back Pain





Endocrine


Hx Endocrine Disorders:  Yes (Lantus/ Novolog)


Endocrine Disorders:  Diabetes, Insulin dep





HEENT


HX ENT Disorders:  Yes (report the start of cataracts, migrains)


HEENT Disorders:  Cataract


Loss of Vision:  Bilateral


Hearing Impairment:  Denies





Cancer


Hx Cancer:  No





Psychosocial


Hx Psychiatric Problems:  Yes


Behavioral Health Disorders:  Anxiety, Depression





Integumentary


HX Skin/Integumentary Disorder:  Yes (SEEING WOUND CARE FOR WOUND ON FEET, 

history of cellulitis)





Blood Transfusions


Hx Blood Disorders:  No


Adverse Reaction to a Blood Tr:  No





Family Medical History


Significant Family History:  No Pertinent Family Hx


Family Medial History:  


Congestive heart failure


  03 FATHER


Prostate cancer


  03 FATHER





No Family History of:


  Abdominal aortic aneurysm


  Manson's disease


  Alcoholism


  Aphasia


  Cancer


  Cancer of colon


  Cataract


  Congenital heart disease


  Cystic fibrosis


  Dementia


  Dysphagia


  Family history: Allergy


  Family history: Alzheimer's disease


  Family history: Arthritis


  Family history: Asthma


  Family history: Breast disease


  Family history: Cardiovascular disease


  Family history: Coronary thrombosis


  Family history: Diabetes mellitus


  Family history: Gastrointestinal disease


  Family history: Glaucoma


  Family history: Hypertension


  Family history: Osteoporosis


  Family history: Thyroid disorder


  Headache


  Hearing loss


  Heart disease


  Hereditary disease


  History of - anemia


  History of - disorder


  History of - respiratory disease


  History of drug abuse


  Human immunodeficiency virus (HIV) seropositivity


  Hypercholesterolemia


  Infertile


  Kidney disease


  Malignant neoplasm of lung


  Myocardial infarction


  Parkinson's disease


  Psychotic disorder


  Seizure disorder


  Stroke


  Tuberculosis


  Visual impairment





Physical Exam


Vital Signs





 Vital Sign - Last 12Hours








 3/7/17





 18:04


 


Temp 98.0


 


Pulse 70


 


Resp 20


 


B/P 137/63


 


Pulse Ox 95


 


O2 Delivery Room Air





Capillary Refill : Less Than 3 Seconds


General Appearance:   No Apparent Distress WD/WN Obese


HEENT:   PERRL/EOMI Normal ENT Inspection


Neck:   Normal Inspection


Respiratory:   Lungs Clear Normal Breath Sounds No Accessory Muscle Use No 

Respiratory Distress


Cardiovascular:   Regular Rate, Rhythm No Edema No Murmur Other (faint 

peripheral pulses palpable in the feet bilaterally, right greater than left)


Gastrointestinal:   Normal Bowel Sounds Non Tender Soft


Extremity:   Pedal Edema Swelling (swelling of the distal bilateral lower 

extremities, left greater than right.  Warm, blanching erythema with brisk 

capillary refill.  Disfigurement of the left foot.  There are some weeping 

ruptured blisters on both legs.)


Neurologic/Psychiatric:   Alert Oriented x3 No Motor/Sensory Deficits Normal 

Mood/Affect CNs II-XII Norm as Tested


Skin:   Warm/Dry Erythema





Progress/Results/Core Measures


Results/Orders


Lab Results





 Laboratory Tests








Test


  3/7/17


18:30 Range/Units


 


 


Alanine Aminotransferase


(ALT/SGPT) 14 


  0-55  U/L


 


 


Albumin 3.6  3.2-4.5  G/DL


 


Alkaline Phosphatase 70    U/L


 


Anion Gap 10  5-14  MMOL/L


 


Aspartate Amino Transf


(AST/SGOT) 13 


  5-34  U/L


 


 


B-Type Natriuretic Peptide 66.4  <100.0  PG/ML


 


BUN/Creatinine Ratio 19   


 


Basophils # (Auto)


  0.1 


  0.0-0.1


10^3/uL


 


Basophils (%) (Auto) 2  0-10  %


 


Blood Urea Nitrogen 16  7-18  MG/DL


 


C-Reactive Protein High


Sensitivity 0.15 


  0.00-0.50


MG/DL


 


Calcium Level 9.6  8.5-10.1  MG/DL


 


Carbon Dioxide Level 27  21-32  MMOL/L


 


Chloride Level 104    MMOL/L


 


Creatinine


  0.85 


  0.60-1.30


MG/DL


 


D-Dimer


  0.59 H


  0.00-0.49


UG/ML


 


Eosinophils # (Auto)


  0.7 H


  0.0-0.3


10^3/uL


 


Eosinophils (%) (Auto) 8  0-10  %


 


Estimat Glomerular Filtration


Rate > 60 


   


 


 


Glucose Level 186 H   MG/DL


 


Hematocrit 34 L 35-52  %


 


Hemoglobin 10.9 L 11.5-16.0  G/DL


 


Lymphocytes # (Auto) 1.5  1.0-4.0  X 10^3


 


Lymphocytes (%) (Auto) 17  12-44  %


 


Mean Corpuscular Hemoglobin 27  25-34  PG


 


Mean Corpuscular Hemoglobin


Concent 32 


  32-36  G/DL


 


 


Mean Corpuscular Volume 83  80-99  FL


 


Mean Platelet Volume 10.2  7.4-10.4  FL


 


Monocytes # (Auto) 1.0  0.0-1.0  X 10^3


 


Monocytes (%) (Auto) 12  0-12  %


 


Neutrophils # (Auto) 5.4  1.8-7.8  X 10^3


 


Neutrophils (%) (Auto) 62  42-75  %


 


Platelet Count


  233 


  130-400


10^3/uL


 


Potassium Level 3.8  3.6-5.0  MMOL/L


 


Red Blood Count


  4.08 L


  4.35-5.85


10^6/uL


 


Red Cell Distribution Width 14.7 H 10.0-14.5  %


 


Sodium Level 141  135-145  MMOL/L


 


Total Bilirubin 0.2  0.1-1.0  MG/DL


 


Total Protein 6.7  6.4-8.2  G/DL


 


White Blood Count


  8.7 


  4.3-11.0


10^3/uL








My Orders





 Orders-KARMEN HERNANDEZ MD


Cbc With Automated Diff (3/7/17 18:26)


Comprehensive Metabolic Panel (3/7/17 18:26)


Hs C Reactive Protein (3/7/17 18:26)


Fibrin Degradation Products (3/7/17 18:26)


Saline Lock/Iv-Start (3/7/17 18:26)


BNP (3/7/17 18:40)


Chest 1 View, Ap/Pa Only (3/7/17 18:40)


Us Venous Lower Ext Adam (3/7/17 19:16)


Us Adam Lower Ext [id number] (3/7/17 19:16)


Clindamycin 900 Mg/50 Ml Ivpb (Cleocin P (3/7/17 19:30)





Medications Given in ED





 Current Medications








 Medications  Dose


 Ordered  Sig/Riaz


 Route  Start Time


 Stop Time Status Last Admin


Dose Admin


 


 Clindamycin


 Phosphate/Dextrose  50 ml @ 


 100 mls/hr  ONCE  ONCE


 IV  3/7/17 19:30


 3/7/17 19:59 DC 3/7/17 20:13


100 MLS/HR








Vital Signs/I&O





 Vital Sign - Last 12Hours








 3/7/17 3/7/17





 18:04 21:46


 


Temp 98.0 98.1


 


Pulse 70 74


 


Resp 20 20


 


B/P 137/63 


 


Pulse Ox 95 98


 


O2 Delivery Room Air 








Blood Pressure Mean:  87


Progress Note :  


Progress Note


Patient's lab workup was relatively unremarkable.  WBC and CRP were normal.  

Patient has no fever or tachycardia.  Venous and arterial ultrasounds were also 

negative.  Patient received a dose of IV clindamycin for treatment of potential 

developing cellulitis.  I believe the patient's primary issue is dependent 

edema.  We discussed elevating her legs is much as possible.  We also discussed 

increasing her Lasix dose for a couple of days to help promote diuresis.  

Patient did not meet admission criteria.





Departure


Impression


Impression:  


 Primary Impression:  


 Dependent edema


 Additional Impression:  


 Cellulitis of lower extremity


 Qualified Code:  L03.119 - Cellulitis of unspecified part of limb


Disposition:  01 HOME, SELF-CARE


Condition:  Improved





Departure-Patient Inst.


Decision time for Depature:  21:20


Referrals:  


LELAND DACOSTA DO (PCP/Family)


Primary Care Physician


Patient Instructions:  Cellulitis (Skin Infection), Adult (DC), Dependent Edema 

(DC)





Add. Discharge Instructions:


Keep your legs elevated to the level of your heart is much as possible.  

Continue wound care with Dr. Mcnally.  Follow-up with Dr. Dacosta this week.  

You may increase your Lasix dose by one half tablet to be taken after lunch for 

the next 2 or 3 days.  This may help reduce the swelling.  Complete your 

antibiotics as prescribed as you may be developing early cellulitis.  Return to 

the emergency room if symptoms worsen.











All discharge instructions reviewed with patient and/or family. Voiced 

understanding.


Scripts


Cephalexin (Keflex)500 Mg [id number] Mg PO QID #40 CAP


   Prov:KARMEN HERNANDEZ MD         3/7/17





Copy


Copies To 1:   LELAND DACOSTA JOSHUA T MD Mar 7, 2017 18:39





KARMEN HERNANDEZ MD Mar 7, 2017 18:39

## 2017-04-27 ENCOUNTER — HOSPITAL ENCOUNTER (EMERGENCY)
Dept: HOSPITAL 75 - ER | Age: 67
Discharge: HOME | End: 2017-04-27
Payer: MEDICARE

## 2017-04-27 VITALS — HEIGHT: 66 IN | BODY MASS INDEX: 32.14 KG/M2 | WEIGHT: 200 LBS

## 2017-04-27 VITALS — DIASTOLIC BLOOD PRESSURE: 96 MMHG | SYSTOLIC BLOOD PRESSURE: 146 MMHG

## 2017-04-27 DIAGNOSIS — L03.116: Primary | ICD-10-CM

## 2017-04-27 DIAGNOSIS — I10: ICD-10-CM

## 2017-04-27 DIAGNOSIS — Z79.4: ICD-10-CM

## 2017-04-27 DIAGNOSIS — Z79.899: ICD-10-CM

## 2017-04-27 DIAGNOSIS — Z87.891: ICD-10-CM

## 2017-04-27 DIAGNOSIS — E11.9: ICD-10-CM

## 2017-04-27 LAB
ALBUMIN SERPL-MCNC: 3.7 G/DL (ref 3.2–4.5)
ALT SERPL-CCNC: 16 U/L (ref 0–55)
ANION GAP SERPL CALC-SCNC: 11 MMOL/L (ref 5–14)
AST SERPL-CCNC: 14 U/L (ref 5–34)
BASOPHILS # BLD AUTO: 0.1 10^3/UL (ref 0–0.1)
BASOPHILS NFR BLD AUTO: 1 % (ref 0–10)
BILIRUB SERPL-MCNC: 0.2 MG/DL (ref 0.1–1)
BUN SERPL-MCNC: 16 MG/DL (ref 7–18)
BUN/CREAT SERPL: 20
CALCIUM SERPL-MCNC: 9.8 MG/DL (ref 8.5–10.1)
CHLORIDE SERPL-SCNC: 104 MMOL/L (ref 98–107)
CO2 SERPL-SCNC: 26 MMOL/L (ref 21–32)
CREAT SERPL-MCNC: 0.79 MG/DL (ref 0.6–1.3)
EOSINOPHIL # BLD AUTO: 0.8 10^3/UL (ref 0–0.3)
EOSINOPHIL NFR BLD AUTO: 9 % (ref 0–10)
ERYTHROCYTE [DISTWIDTH] IN BLOOD BY AUTOMATED COUNT: 14.6 % (ref 10–14.5)
ERYTHROCYTE [SEDIMENTATION RATE] IN BLOOD: 34 MM/HR (ref 0–30)
GFR SERPLBLD BASED ON 1.73 SQ M-ARVRAT: > 60 ML/MIN
GLUCOSE SERPL-MCNC: 184 MG/DL (ref 70–105)
HS C REACTIVE PROTEIN: 0.42 MG/DL (ref 0–0.5)
LYMPHOCYTES # BLD AUTO: 1.4 X 10^3 (ref 1–4)
LYMPHOCYTES NFR BLD AUTO: 15 % (ref 12–44)
MCH RBC QN AUTO: 27 PG (ref 25–34)
MCHC RBC AUTO-ENTMCNC: 32 G/DL (ref 32–36)
MCV RBC AUTO: 84 FL (ref 80–99)
MONOCYTES # BLD AUTO: 0.9 X 10^3 (ref 0–1)
MONOCYTES NFR BLD AUTO: 10 % (ref 0–12)
NEUTROPHILS # BLD AUTO: 5.8 X 10^3 (ref 1.8–7.8)
NEUTROPHILS NFR BLD AUTO: 65 % (ref 42–75)
PLATELET # BLD: 218 10^3/UL (ref 130–400)
PMV BLD AUTO: 10.4 FL (ref 7.4–10.4)
POTASSIUM SERPL-SCNC: 4 MMOL/L (ref 3.6–5)
PROT SERPL-MCNC: 6.8 G/DL (ref 6.4–8.2)
RBC # BLD AUTO: 4.3 10^6/UL (ref 4.35–5.85)
SODIUM SERPL-SCNC: 141 MMOL/L (ref 135–145)
WBC # BLD AUTO: 8.9 10^3/UL (ref 4.3–11)

## 2017-04-27 PROCEDURE — 99281 EMR DPT VST MAYX REQ PHY/QHP: CPT

## 2017-04-27 PROCEDURE — 83605 ASSAY OF LACTIC ACID: CPT

## 2017-04-27 PROCEDURE — 85652 RBC SED RATE AUTOMATED: CPT

## 2017-04-27 PROCEDURE — 85025 COMPLETE CBC W/AUTO DIFF WBC: CPT

## 2017-04-27 PROCEDURE — 86141 C-REACTIVE PROTEIN HS: CPT

## 2017-04-27 PROCEDURE — 36415 COLL VENOUS BLD VENIPUNCTURE: CPT

## 2017-04-27 PROCEDURE — 80053 COMPREHEN METABOLIC PANEL: CPT

## 2017-04-27 PROCEDURE — 93926 LOWER EXTREMITY STUDY: CPT

## 2017-04-27 NOTE — DIAGNOSTIC IMAGING REPORT
INDICATION: Left foot wound.



EXAMINATION: Duplex ultrasound of the arterial system of the

left leg was done with grayscale, color Doppler, spectral

waveform and color Doppler flow analysis.



FINDINGS: Duplex ultrasound of the arterial system of the left

leg shows normal velocities throughout the leg. The wave form

transitions from triphasic to monophasic below the knee.



IMPRESSION: There is some dampening of the waveform below the

knee suggesting peripheral vascular disease but there is no

occlusion or hemodynamically significant stenosis detected.



Dictated by:



Dictated on workstation # [id number]

## 2017-04-27 NOTE — ED LOWER EXTREMITY
General


Chief Complaint:  Skin/Wound Problems


Stated Complaint:  LEFT FOOT BUMP/REDNESS/WARM


Nursing Triage Note:  


TO ED PER W/C HAS HAD WOUND ON TOP OF L FOOT THAT  HAS BEEN WATCHING FOR 1 


YEAR. YESTERDAY HOME HEALTH CONCERNED ABOUT AREA AND WANTED HER TO HAVE IT 


CHECKED OUT.


Nursing Sepsis Screen:  No Definite Risk





History of Present Illness


Time seen by provider:  14:40


Initial Comments


Evaluation for left foot, concerned about a possible ulcer. She has had long-

standing issues with ulcers to her bilateral lower extremities. She has been in 

wound care and currently has home health seeing her.


Pain/Injury Location:  left foot


Method of Injury:  unknown


Modifying Factors:  Improves With Rest





Allergies and Home Medications


Allergies


Coded Allergies:  


     No Known Drug Allergies (Unverified , 3/11/14)





Home Medications


Acetaminophen 650 Mg Tablet.er, 650-1,200 MG PO Q4H PRN for PAIN, (Reported)


Alprazolam 0.25 Mg Tablet, 0.25 MG PO BID PRN for ANXIETY, (Reported)


Amoxicillin/Potassium Clav 1 Each Tablet, 1 EACH PO BID, #14


   Prescribed by: LELAND DACOSTA on 17 0754


Calcium Carbonate 200 Mg Tab.chew, 400 MG PO QID PRN for INDIGESTION, (Reported)


Cephalexin 500 Mg Capsule, 500 MG PO QID, #40


   Prescribed by: KARMEN CREWS on 3/7/17 2128


Duloxetine HCl 60 Mg Capsule.dr, 30 MG PO DAILY, (Reported)


   LAST FILLED #30 -16 


Esomeprazole Magnesium 40 Mg Cap, 40 MG PO DAILY, (Reported)


   LAST FILLED #90 10-18-16 


Furosemide 40 Mg Tablet, 40 MG PO DAILY, (Reported)


Ibuprofen 200 Mg Tablet, 600 MG PO TID PRN for PAIN, (Reported)


Insulin Aspart 300 Units/3 Ml Solution, SQ QID, (Reported)


    = 0 UNITS 201-250 = 3 UNITS 251-300 = 5 UNITS 301-350 = 7 UNITS 351-

400 = 9 UNITS 


Insulin Glargine,Hum.rec.anlog 100 Unit/1 Ml Insuln.pen, 40 UNITS SQ BID, (

Reported)


   HOLD IF BLOOD SUGAR IS 95 OR BELOW 


Iron Polysaccharide Complex 150 Mg Capsule, 150 MG PO BID, (Reported)


Loperamide HCl 2 Mg Tablet, 2 MG PO UD PRN for DIARRHEA, (Reported)


Loratadine 10 Mg Tablet, 10 MG PO DAILY, (Reported)


Metformin HCl 1,000 Mg Tablet, 1,000 MG PO BID, (Reported)


   LAST FILLED #180 16 


Polyethylene Glycol 3350 17 Gm Powd.pack, 17 GM PO DAILY PRN for CONSTIPATION, (

Reported)


Pregabalin 150 Mg Capsule, 150 MG PO TID, (Reported)


   LAST FILLED #90 16 


Ropinirole HCl 2 Mg Tablet, 2 MG PO BID, (Reported)


   LAST FILLED #60 16 


Rosuvastatin Calcium 10 Mg Tablet, 10 MG PO DAILY, (Reported)


Sulfamethoxazole/Trimethoprim 1 Each Tablet, 1 EACH PO BID, #20 Ref 0


   Prescribed by: SEDRICK TORREZ on 17 1646


Tolnaftate 45 Gm Powder, TP DAILY, (Reported)


Tramadol HCl 50 Mg Tablet, 50 MG PO TID, (Reported)


   LAST FILLED #63 16 





Constitutional:  no symptoms reported, see HPI


EENTM:  no symptoms reported, see HPI


Respiratory:  no symptoms reported, see HPI


Cardiovascular:  no symptoms reported, see HPI


Gastrointestinal:  no symptoms reported, see HPI


Genitourinary:  no symptoms reported


Musculoskeletal:  no symptoms reported, see HPI


Skin:  see HPI, change in color (left lower extremity)


Psychiatric/Neurological:  No Symptoms Reported, See HPI


All Other Systems Reviewed


Negative Unless Noted:  Yes





Past Medical-Social-Family Hx


Patient Social History


Alcohol Use:  Denies Use


Recreational Drug Use:  No


Smoking Status:  Former Smoker


Type Used:  Cigarettes


Former Smoker/When Quit:  1992


Recent Foreign Travel:  No


Contact w/Someone Who Travel:  No


Recent Infectious Disease Expo:  No


Recent Hopitalizations:  Yes (2017-CELLULITIS)





Immunizations Up To Date


Tetanus Booster (TDap):  Less than 5yrs


PED Vaccines UTD:  Yes


Date of Pneumonia Vaccine:  Sep 12, 2012


Date of Influenza Vaccine:  Sep 18, 2016





Seasonal Allergies


Seasonal Allergies:  No





Surgeries


HX Surgeries:  Yes (HEMORRHOIDECTOMY, L HIP replacement, reduction left hip 

dislocation x2)


Surgeries:  Gallbladder, Joint Replacement, Orthopedic





Respiratory


Hx Respiratory Disorders:  No





Cardiovascular


Hx Cardiac Disorders:  Yes (CHF)


Cardiac Disorders:  Hypertension





Neurological


Hx Neurological Disorders:  Yes (RLS)


Neurological Disorders:  Neuropathy





Reproductive System


Hx Reproductive Disorders:  No


Sexually Transmitted Disease:  No


HIV/AIDS:  No





Genitourinary


Hx Genitourinary Disorders:  No





Gastrointestinal


Hx Gastrointestinal Disorders:  Yes


Gastrointestinal Disorders:  Chronic Constipation, Hemorrhoids





Musculoskeletal


Hx Musculoskeletal Disorders:  Yes (school age /  accident)


Musculoskeletal Disorders:  Arthritis, Back Injury, Chronic Back Pain





Endocrine


Hx Endocrine Disorders:  Yes (Lantus/ Novolog)


Endocrine Disorders:  Diabetes, Insulin dep





HEENT


HX ENT Disorders:  Yes (report the start of cataracts, migrains)


HEENT Disorders:  Cataract


Loss of Vision:  Bilateral


Hearing Impairment:  Denies





Cancer


Hx Cancer:  No





Psychosocial


Hx Psychiatric Problems:  Yes


Behavioral Health Disorders:  Anxiety, Depression





Integumentary


HX Skin/Integumentary Disorder:  Yes (SEEING WOUND CARE FOR WOUND ON FEET, 

history of cellulitis)





Blood Transfusions


Hx Blood Disorders:  No


Adverse Reaction to a Blood Tr:  No





Reviewed Nursing Assessment


Reviewed/Agree w Nursing PMH:  Yes





Family Medical History


Significant Family History:  No Pertinent Family Hx


Family Medial History:  


Congestive heart failure


  03 FATHER


Prostate cancer


  03 FATHER





No Family History of:


  Abdominal aortic aneurysm


  [place]'s disease


  Alcoholism


  Aphasia


  Cancer


  Cancer of colon


  Cataract


  Congenital heart disease


  Cystic fibrosis


  Dementia


  Dysphagia


  Family history: Allergy


  Family history: Alzheimer's disease


  Family history: Arthritis


  Family history: Asthma


  Family history: Breast disease


  Family history: Cardiovascular disease


  Family history: Coronary thrombosis


  Family history: Diabetes mellitus


  Family history: Gastrointestinal disease


  Family history: Glaucoma


  Family history: Hypertension


  Family history: Osteoporosis


  Family history: Thyroid disorder


  Headache


  Hearing loss


  Heart disease


  Hereditary disease


  History of - anemia


  History of - disorder


  History of - respiratory disease


  History of drug abuse


  Human immunodeficiency virus (HIV) seropositivity


  Hypercholesterolemia


  Infertile


  Kidney disease


  Malignant neoplasm of lung


  Myocardial infarction


  Parkinson's disease


  Psychotic disorder


  Seizure disorder


  Stroke


  Tuberculosis


  Visual impairment





Physical Exam


Vital Signs





Vital Sign - Last 12Hours








 17





 14:31 17:01


 


Temp 97.1 


 


Pulse 73 


 


Resp 18 


 


B/P (MAP) 155/72 


 


Pulse Ox  96





Capillary Refill : Less Than 3 Seconds


General Appearance:  WD/WN, no apparent distress


HEENT:  PERRL/EOMI, normal ENT inspection, TMs normal, pharynx normal


Neck:  non-tender, full range of motion, supple


Cardiovascular:  regular rate, rhythm, No no edema, no murmur, other (

peripheral pulses 1+ and symmetric, 2+ edema bilateral lower extremities)


Respiratory:  chest non-tender, lungs clear


Gastrointestinal:  normal bowel sounds, non tender, soft


Ankles:  bilateral ankle non-tender, bilateral ankle limited range of motion, 

bilateral ankle soft tissue tenderness


Feet:  right foot limited range of motion, right foot soft tissue tenderness, 

right foot swelling, right foot other (trace erythema from ankle to toes, small 

pinpoint area of ulceration on the dorsum of the midfoot. No ranging inch, 

warmth, induration or fluctuation. )


Neurologic/Tendon:  normal sensation, normal motor functions, normal tendon 

functions


Neurologic/Psychiatric:  no motor/sensory deficits, alert, normal mood/affect, 

oriented x 3


Lymphatic:  no adenopathy





Progress/Results/Core Measures


Results/Orders


Lab Results





Laboratory Tests








Test


  17


15:29 Range/Units


 


 


White Blood Count


  8.9 


  4.3-11.0


10^3/uL


 


Red Blood Count


  4.30 L


  4.35-5.85


10^6/uL


 


Hemoglobin 11.5  11.5-16.0  G/DL


 


Hematocrit 36  35-52  %


 


Mean Corpuscular Volume 84  80-99  FL


 


Mean Corpuscular Hemoglobin 27  25-34  PG


 


Mean Corpuscular Hemoglobin


Concent 32 


  32-36  G/DL


 


 


Red Cell Distribution Width 14.6 H 10.0-14.5  %


 


Platelet Count


  218 


  130-400


10^3/uL


 


Mean Platelet Volume 10.4  7.4-10.4  FL


 


Neutrophils (%) (Auto) 65  42-75  %


 


Lymphocytes (%) (Auto) 15  12-44  %


 


Monocytes (%) (Auto) 10  0-12  %


 


Eosinophils (%) (Auto) 9  0-10  %


 


Basophils (%) (Auto) 1  0-10  %


 


Neutrophils # (Auto) 5.8  1.8-7.8  X 10^3


 


Lymphocytes # (Auto) 1.4  1.0-4.0  X 10^3


 


Monocytes # (Auto) 0.9  0.0-1.0  X 10^3


 


Eosinophils # (Auto)


  0.8 H


  0.0-0.3


10^3/uL


 


Basophils # (Auto)


  0.1 


  0.0-0.1


10^3/uL


 


Erythrocyte Sedimentation Rate 34 H 0-30  MM/HR


 


Sodium Level 141  135-145  MMOL/L


 


Potassium Level 4.0  3.6-5.0  MMOL/L


 


Chloride Level 104    MMOL/L


 


Carbon Dioxide Level 26  21-32  MMOL/L


 


Anion Gap 11  5-14  MMOL/L


 


Blood Urea Nitrogen 16  7-18  MG/DL


 


Creatinine


  0.79 


  0.60-1.30


MG/DL


 


Estimat Glomerular Filtration


Rate > 60 


   


 


 


BUN/Creatinine Ratio 20   


 


Glucose Level 184 H   MG/DL


 


Lactic Acid Level


  1.15 


  0.50-2.00


MMOL/L


 


Calcium Level 9.8  8.5-10.1  MG/DL


 


Total Bilirubin 0.2  0.1-1.0  MG/DL


 


Aspartate Amino Transf


(AST/SGOT) 14 


  5-34  U/L


 


 


Alanine Aminotransferase


(ALT/SGPT) 16 


  0-55  U/L


 


 


Alkaline Phosphatase 77    U/L


 


C-Reactive Protein High


Sensitivity 0.42 


  0.00-0.50


MG/DL


 


Total Protein 6.8  6.4-8.2  G/DL


 


Albumin 3.7  3.2-4.5  G/DL








My Orders





Orders - SEDRICK TORREZ


Cbc With Automated Diff (17 14:52)


Comprehensive Metabolic Panel (17 14:52)


Hs C Reactive Protein (17 14:52)


Lactic Acid Analyzer (17 14:52)


Erythrocyte Sedimentation Rate (17 14:52)


Us Venous Lower Ext Lt (17 15:08)


Us Left Low Ext Arterial 04649 (17 15:08)





Vital Signs/I&O





Vital Sign - Last 12Hours








 17





 14:31 17:01


 


Temp 97.1 


 


Pulse 73 83


 


Resp 18 18


 


B/P (MAP) 155/72 


 


Pulse Ox  96














Blood Pressure Mean:  99








Progress Note :  


   Time:  14:40


Progress Note


Initial evaluation completed, recommended labs and venous and arterial 

ultrasound of the lower extremity.


1540 WBC 8.9, ESR 34, glucose 184, sodium 141, potassium 4, lactic acid 1.15, C-

reactive protein 0.42.


1600 discussed lab results and ultrasound results with patient, all essentially 

normal. Discussed at length for her ability to care for herself at home she has 

been having arguments with her sister who assists with her care. She assures me 

she is able to care for herself at home, though I do have some reservations 

about this. She will follow up with Dr. Dacosta and consider possible 

referral for wound care.





Diagnostic Imaging





   Diagonstic Imaging:  Ultrasound


Comments


NAME:   VERONICAHARJINDERCHANDRIKA EPPERSON


[facility] REC#:   [id number]


ACCOUNT#:   [id number]


PT STATUS:   REG ER


:   1950


PHYSICIAN:   SEDRICK TORREZ


ADMIT DATE:   17/ER


 ***Draft***


Date of Exam:17





US LEFT LOW EXT ARTERIAL 88205








INDICATION: Left foot wound.





EXAMINATION: Duplex ultrasound of the arterial system of the


left leg was done with grayscale, color Doppler, spectral


waveform and color Doppler flow analysis.





FINDINGS: Duplex ultrasound of the arterial system of the left


leg shows normal velocities throughout the leg. The wave form


transitions from triphasic to monophasic below the knee.





IMPRESSION: There is some dampening of the waveform below the


knee suggesting peripheral vascular disease but there is no


occlusion or hemodynamically significant stenosis detected.





Dictated on workstation # [id number]








Dict:   17


Trans:   17


[facility] [id number]





Interpreted by:     ENOCH REID


Electronically signed by:  





NAME:      HOMERO VERONICA


[facility] REC#:   [id number]


ACCOUNT#:   [id number]


PT STATUS:   REG ER


:      1950


PHYSICIAN:    SEDRICK TORREZ


ADMIT DATE:   17/ER


 ***Signed***


Date of Exam:   17





US VENOUS LOWER EXT LT


 





PROCEDURE: US left lower extremity venous.





TECHNIQUE: Multiple real-time grayscale images were obtained


over the left lower extremity in various projections. Additional


duplex Doppler and color Doppler images were also obtained.





INDICATION: Leg swelling.





FINDINGS: The veins are compressible and have normal spontaneous


and augmented flow.





IMPRESSION: Negative venous Doppler of lower extremities.





Dictated by:





Dictated on workstation # [id number]





[id number]





Dict:      17


Trans:      17 161





Interpreted by:         ENOCH REID


Electronically signed by:   ENOCH REID 17





Departure


Impression


Impression:  


 Primary Impression:  


 Cellulitis


 Qualified Codes:  L03.116 - Cellulitis of left lower limb


Disposition:   HOME, SELF-CARE


Condition:  Stable





Departure-Patient Inst.


Referrals:  


LELAND DACOSTA DO (PCP/Family)


Primary Care Physician


Patient Instructions:  Cellulitis (Skin Infection), Adult (DC)





Add. Discharge Instructions:  


All discharge instructions reviewed with patient and/or family. Voiced 

understanding.


Keep legs elevated and wraps on.


Follow-up with Dr. Dacosta early next week. If continued symptoms follow-up 

with Dr. Wise for wound care.


Take antibiotic as prescribed.


Return to emergency department for fevers, increased drainage or warmth and 

lower extremities or new concerns.


Scripts


Sulfamethoxazole/Trimethoprim (Bactrim Ds Tablet) 1 Each Tablet


1 EACH PO BID, #20 TAB 0 Refills


   Prov: SEDRICK TORREZ         17





Copy


Copies To 1:   LELAND DACOSTA DO


Copies To 2:   ROSHNI WISE MD, AMY ARNP 2017 16:27

## 2017-04-27 NOTE — DIAGNOSTIC IMAGING REPORT
PROCEDURE: US left lower extremity venous.



TECHNIQUE: Multiple real-time grayscale images were obtained

over the left lower extremity in various projections. Additional

duplex Doppler and color Doppler images were also obtained.



INDICATION: Leg swelling.



FINDINGS: The veins are compressible and have normal spontaneous

and augmented flow.



IMPRESSION: Negative venous Doppler of lower extremities.



Dictated by:



Dictated on workstation # [id number]

## 2017-05-05 ENCOUNTER — HOSPITAL ENCOUNTER (INPATIENT)
Dept: HOSPITAL 75 - ER | Age: 67
LOS: 2 days | Discharge: HOME HEALTH | DRG: 872 | End: 2017-05-07
Attending: FAMILY MEDICINE | Admitting: FAMILY MEDICINE
Payer: MEDICARE

## 2017-05-05 VITALS — HEIGHT: 66 IN | BODY MASS INDEX: 35.88 KG/M2 | WEIGHT: 223.25 LBS

## 2017-05-05 VITALS — SYSTOLIC BLOOD PRESSURE: 132 MMHG | DIASTOLIC BLOOD PRESSURE: 56 MMHG

## 2017-05-05 DIAGNOSIS — Z96.642: ICD-10-CM

## 2017-05-05 DIAGNOSIS — I50.9: ICD-10-CM

## 2017-05-05 DIAGNOSIS — E87.2: ICD-10-CM

## 2017-05-05 DIAGNOSIS — E11.65: ICD-10-CM

## 2017-05-05 DIAGNOSIS — D64.9: ICD-10-CM

## 2017-05-05 DIAGNOSIS — F32.9: ICD-10-CM

## 2017-05-05 DIAGNOSIS — Z87.891: ICD-10-CM

## 2017-05-05 DIAGNOSIS — L03.115: ICD-10-CM

## 2017-05-05 DIAGNOSIS — Z79.4: ICD-10-CM

## 2017-05-05 DIAGNOSIS — F41.9: ICD-10-CM

## 2017-05-05 DIAGNOSIS — L03.116: ICD-10-CM

## 2017-05-05 DIAGNOSIS — I10: ICD-10-CM

## 2017-05-05 DIAGNOSIS — A41.9: Primary | ICD-10-CM

## 2017-05-05 DIAGNOSIS — I73.9: ICD-10-CM

## 2017-05-05 LAB
ALBUMIN SERPL-MCNC: 3.7 G/DL (ref 3.2–4.5)
ALT SERPL-CCNC: 18 U/L (ref 0–55)
ANION GAP SERPL CALC-SCNC: 12 MMOL/L (ref 5–14)
APTT BLD: 31 SEC (ref 24–35)
AST SERPL-CCNC: 20 U/L (ref 5–34)
BASOPHILS # BLD AUTO: 0.1 10^3/UL (ref 0–0.1)
BASOPHILS NFR BLD AUTO: 0 % (ref 0–10)
BASOPHILS NFR BLD MANUAL: 0 %
BILIRUB SERPL-MCNC: 0.3 MG/DL (ref 0.1–1)
BILIRUB UR QL STRIP: (no result)
BUN SERPL-MCNC: 27 MG/DL (ref 7–18)
BUN/CREAT SERPL: 27
CALCIUM SERPL-MCNC: 9.5 MG/DL (ref 8.5–10.1)
CHLORIDE SERPL-SCNC: 103 MMOL/L (ref 98–107)
CO2 SERPL-SCNC: 23 MMOL/L (ref 21–32)
CREAT SERPL-MCNC: 1 MG/DL (ref 0.6–1.3)
EOSINOPHIL # BLD AUTO: 0 10^3/UL (ref 0–0.3)
EOSINOPHIL NFR BLD AUTO: 0 % (ref 0–10)
EOSINOPHIL NFR BLD MANUAL: 1 %
ERYTHROCYTE [DISTWIDTH] IN BLOOD BY AUTOMATED COUNT: 14.6 % (ref 10–14.5)
GFR SERPLBLD BASED ON 1.73 SQ M-ARVRAT: 55 ML/MIN
GLUCOSE SERPL-MCNC: 196 MG/DL (ref 70–105)
INR PPP: 1.2 (ref 0.8–1.4)
KETONES UR QL STRIP: (no result)
LEUKOCYTE ESTERASE UR QL STRIP: (no result)
LYMPHOCYTES # BLD AUTO: 1 X 10^3 (ref 1–4)
LYMPHOCYTES NFR BLD AUTO: 7 % (ref 12–44)
MCH RBC QN AUTO: 27 PG (ref 25–34)
MCHC RBC AUTO-ENTMCNC: 32 G/DL (ref 32–36)
MCV RBC AUTO: 83 FL (ref 80–99)
MONOCYTES # BLD AUTO: 1.3 X 10^3 (ref 0–1)
MONOCYTES NFR BLD AUTO: 10 % (ref 0–12)
NEUTROPHILS # BLD AUTO: 11.3 X 10^3 (ref 1.8–7.8)
NEUTROPHILS NFR BLD AUTO: 83 % (ref 42–75)
NEUTS BAND NFR BLD MANUAL: 89 %
NEUTS BAND NFR BLD: 0 %
NITRITE UR QL STRIP: NEGATIVE
PH UR STRIP: 5 [PH] (ref 5–9)
PLATELET # BLD: 212 10^3/UL (ref 130–400)
PMV BLD AUTO: 11.2 FL (ref 7.4–10.4)
POTASSIUM SERPL-SCNC: 4.3 MMOL/L (ref 3.6–5)
PROT SERPL-MCNC: 6.8 G/DL (ref 6.4–8.2)
PROT UR QL STRIP: (no result)
PROTHROMBIN TIME: 14.5 SEC (ref 12.2–14.7)
RBC # BLD AUTO: 3.96 10^6/UL (ref 4.35–5.85)
SODIUM SERPL-SCNC: 138 MMOL/L (ref 135–145)
SP GR UR STRIP: 1.02 (ref 1.02–1.02)
UROBILINOGEN UR-MCNC: 1 MG/DL
VARIANT LYMPHS NFR BLD MANUAL: 8 %
WBC # BLD AUTO: 13.7 10^3/UL (ref 4.3–11)
WBC #/AREA URNS HPF: (no result) /HPF

## 2017-05-05 PROCEDURE — 51701 INSERT BLADDER CATHETER: CPT

## 2017-05-05 PROCEDURE — 85007 BL SMEAR W/DIFF WBC COUNT: CPT

## 2017-05-05 PROCEDURE — 70450 CT HEAD/BRAIN W/O DYE: CPT

## 2017-05-05 PROCEDURE — 85025 COMPLETE CBC W/AUTO DIFF WBC: CPT

## 2017-05-05 PROCEDURE — 73502 X-RAY EXAM HIP UNI 2-3 VIEWS: CPT

## 2017-05-05 PROCEDURE — 85730 THROMBOPLASTIN TIME PARTIAL: CPT

## 2017-05-05 PROCEDURE — 96361 HYDRATE IV INFUSION ADD-ON: CPT

## 2017-05-05 PROCEDURE — 71010: CPT

## 2017-05-05 PROCEDURE — 80053 COMPREHEN METABOLIC PANEL: CPT

## 2017-05-05 PROCEDURE — 82962 GLUCOSE BLOOD TEST: CPT

## 2017-05-05 PROCEDURE — 87040 BLOOD CULTURE FOR BACTERIA: CPT

## 2017-05-05 PROCEDURE — 83605 ASSAY OF LACTIC ACID: CPT

## 2017-05-05 PROCEDURE — 94760 N-INVAS EAR/PLS OXIMETRY 1: CPT

## 2017-05-05 PROCEDURE — 85027 COMPLETE CBC AUTOMATED: CPT

## 2017-05-05 PROCEDURE — 36415 COLL VENOUS BLD VENIPUNCTURE: CPT

## 2017-05-05 PROCEDURE — 96365 THER/PROPH/DIAG IV INF INIT: CPT

## 2017-05-05 PROCEDURE — 85610 PROTHROMBIN TIME: CPT

## 2017-05-05 PROCEDURE — 81000 URINALYSIS NONAUTO W/SCOPE: CPT

## 2017-05-05 RX ADMIN — CLINDAMYCIN PHOSPHATE SCH MLS/HR: 150 INJECTION, SOLUTION INTRAMUSCULAR; INTRAVENOUS at 23:31

## 2017-05-05 RX ADMIN — SODIUM CHLORIDE SCH MLS/HR: 900 INJECTION, SOLUTION INTRAVENOUS at 23:30

## 2017-05-05 NOTE — DIAGNOSTIC IMAGING REPORT
INDICATION: Right-sided pain after fall last night.



DISCUSSION: Single portable upright view of the chest was

obtained, comparison 03/07/2017. No focal consolidation, pleural

fluid, or pneumothorax. Old left rib fractures stable. Stable

normal heart size.



IMPRESSION:

1. Negative portable chest.



Dictated by: 



  Dictated on workstation # [id number]

## 2017-05-05 NOTE — DIAGNOSTIC IMAGING REPORT
INDICATION: Right hip pain.



AP and oblique views of the right hip are obtained.



FINDINGS: Right hip prosthesis is in place. There is some

heterotopic bone lateral to the hip joint. There is no acute

fracture or device loosening.



IMPRESSION:



Right hip prosthesis in place as above. Some heterotopic bone is

noted lateral to the hip joint. There is no acute fracture or

device loosening.



Dictated by: 



  Dictated on workstation # [id number]

## 2017-05-05 NOTE — DIAGNOSTIC IMAGING REPORT
PROCEDURE: CT head without contrast.



TECHNIQUE: Multiple contiguous axial images were obtained through

the brain without the use of intravenous contrast.



INDICATION: Fall with hallucinations, no loss of consciousness.



COMPARISON: 7/3/2015.



DISCUSSION: No adverse interval change. Diffuse brain volume loss

is stable, likely age related. White matter hypoattenuation is

nonspecific though not greater than expected for age related

chronic small vessel ischemic disease, stable. No acute

intracranial hemorrhage, mass, midline shift, or hydrocephalus.

The visualized orbits, paranasal sinuses, mastoid air cells, and

calvarium are unremarkable. 



IMPRESSION:

1. Stable senescent changes as described. No acute intracranial

abnormality identified.



Dictated by: 



  Dictated on workstation # [id number]

## 2017-05-05 NOTE — ED GENERAL
General


Stated Complaint:  L HIP PAIN


Source of Information:  Patient


Exam Limitations:  No Limitations





History of Present Illness


Time Seen by Provider:  15:58


Initial Comments


Here by EMS with report of altered mental status and complains of right hip 

pain.  Patient does have history of cellulitis that apparently has been 

untreated but reportedly has been unchanged since onset.  This is to the 

bilateral lower extremities.  Patient does have fever today.  No report of 

cough or vomiting.  Patient is poor historian.  Patient reportedly had fall 2 

weeks ago that she was able to walk after words and has until today when she 

was complaining of the right hip pain.  She has previous right hip replacement.


Timing/Duration:  24 Hours


Severity:  Moderate


Associated Systoms:  No Chest Pain, No Cough, Fever/Chills, No Nausea/Vomiting, 

No Shortness of Air, Weakness





Allergies and Home Medications


Allergies


Coded Allergies:  


     No Known Drug Allergies (Unverified , 3/11/14)





Home Medications


Acetaminophen 650 Mg Tablet.er, 650-1,200 MG PO Q4H PRN for PAIN, (Reported)


Alprazolam 0.25 Mg Tablet, 0.25 MG PO BID PRN for ANXIETY, (Reported)


Amoxicillin/Potassium Clav 1 Each Tablet, 1 EACH PO BID, #14


   Prescribed by: LELAND DACOSTA on 17 0754


Calcium Carbonate 200 Mg Tab.chew, 400 MG PO QID PRN for INDIGESTION, (Reported)


Cephalexin 500 Mg Capsule, 500 MG PO QID, #40


   Prescribed by: KARMEN CREWS on 3/7/17 2128


Duloxetine HCl 60 Mg Capsule.dr, 30 MG PO DAILY, (Reported)


   LAST FILLED #30 16 


Esomeprazole Magnesium 40 Mg Cap, 40 MG PO DAILY, (Reported)


   LAST FILLED #90 10-18-16 


Furosemide 40 Mg Tablet, 40 MG PO DAILY, (Reported)


Ibuprofen 200 Mg Tablet, 600 MG PO TID PRN for PAIN, (Reported)


Insulin Aspart 300 Units/3 Ml Solution, SQ QID, (Reported)


    = 0 UNITS 201-250 = 3 UNITS 251-300 = 5 UNITS 301-350 = 7 UNITS 351-

400 = 9 UNITS 


Insulin Glargine,Hum.rec.anlog 100 Unit/1 Ml Insuln.pen, 40 UNITS SQ BID, (

Reported)


   HOLD IF BLOOD SUGAR IS 95 OR BELOW 


Iron Polysaccharide Complex 150 Mg Capsule, 150 MG PO BID, (Reported)


Loperamide HCl 2 Mg Tablet, 2 MG PO UD PRN for DIARRHEA, (Reported)


Loratadine 10 Mg Tablet, 10 MG PO DAILY, (Reported)


Metformin HCl 1,000 Mg Tablet, 1,000 MG PO BID, (Reported)


   LAST FILLED #180 16 


Polyethylene Glycol 3350 17 Gm Powd.pack, 17 GM PO DAILY PRN for CONSTIPATION, (

Reported)


Pregabalin 150 Mg Capsule, 150 MG PO TID, (Reported)


   LAST FILLED #90 16 


Ropinirole HCl 2 Mg Tablet, 2 MG PO BID, (Reported)


   LAST FILLED #60 16 


Rosuvastatin Calcium 10 Mg Tablet, 10 MG PO DAILY, (Reported)


Sulfamethoxazole/Trimethoprim 1 Each Tablet, 1 EACH PO BID, #20 Ref 0


   Prescribed by: SEDRICK TORREZ on 17 1646


Tolnaftate 45 Gm Powder, TP DAILY, (Reported)


Tramadol HCl 50 Mg Tablet, 50 MG PO TID, (Reported)


   LAST FILLED #63 16 





Constitutional:  see HPI, chills, fever, weakness


EENTM:  no symptoms reported


Respiratory:  no symptoms reported, No cough, No short of breath


Cardiovascular:  no symptoms reported


Gastrointestinal:  no symptoms reported


Genitourinary:  No dysuria, No pain


Musculoskeletal:  no symptoms reported


Skin:  see HPI, change in color


Psychiatric/Neurological:  See HPI, Weakness


All Other Systems Reviewed


Negative Unless Noted:  Yes





Past Medical-Social-Family Hx


Patient Social History


Alcohol Use:  Denies Use


Recreational Drug Use:  No


Smoking Status:  Former Smoker


Type Used:  Cigarettes


Former Smoker/When Quit:  1992


Recent Hopitalizations:  Yes (2017-CELLULITIS)





Immunizations Up To Date


Tetanus Booster (TDap):  Less than 5yrs


PED Vaccines UTD:  Yes


Date of Pneumonia Vaccine:  Sep 12, 2012


Date of Influenza Vaccine:  Sep 18, 2016





Seasonal Allergies


Seasonal Allergies:  No





Surgeries


HX Surgeries:  Yes (HEMORRHOIDECTOMY, L HIP replacement, reduction left hip 

dislocation x2)


Surgeries:  Gallbladder, Joint Replacement, Orthopedic





Respiratory


Hx Respiratory Disorders:  No





Cardiovascular


Hx Cardiac Disorders:  Yes (CHF)


Cardiac Disorders:  Hypertension





Neurological


Hx Neurological Disorders:  Yes (RLS)


Neurological Disorders:  Neuropathy





Reproductive System


Hx Reproductive Disorders:  No


Sexually Transmitted Disease:  No


HIV/AIDS:  No





Genitourinary


Hx Genitourinary Disorders:  No





Gastrointestinal


Hx Gastrointestinal Disorders:  Yes


Gastrointestinal Disorders:  Chronic Constipation, Hemorrhoids





Musculoskeletal


Hx Musculoskeletal Disorders:  Yes (school age /  accident)


Musculoskeletal Disorders:  Arthritis, Back Injury, Chronic Back Pain





Endocrine


Hx Endocrine Disorders:  Yes (Lantus/ Novolog)


Endocrine Disorders:  Diabetes, Insulin dep





HEENT


HX ENT Disorders:  Yes (report the start of cataracts, migrains)


HEENT Disorders:  Cataract


Loss of Vision:  Bilateral


Hearing Impairment:  Denies





Cancer


Hx Cancer:  No





Psychosocial


Hx Psychiatric Problems:  Yes


Behavioral Health Disorders:  Anxiety, Depression





Integumentary


HX Skin/Integumentary Disorder:  Yes (SEEING WOUND CARE FOR WOUND ON FEET, 

history of cellulitis)





Blood Transfusions


Hx Blood Disorders:  No


Adverse Reaction to a Blood Tr:  No





Reviewed Nursing Assessment


Reviewed/Agree w Nursing PMH:  Yes





Family Medical History


Significant Family History:  No Pertinent Family Hx


Family Medial History:  


Congestive heart failure


  03 FATHER


Prostate cancer


  03 FATHER





No Family History of:


  Abdominal aortic aneurysm


  Sumner's disease


  Alcoholism


  Aphasia


  Cancer


  Cancer of colon


  Cataract


  Congenital heart disease


  Cystic fibrosis


  Dementia


  Dysphagia


  Family history: Allergy


  Family history: Alzheimer's disease


  Family history: Arthritis


  Family history: Asthma


  Family history: Breast disease


  Family history: Cardiovascular disease


  Family history: Coronary thrombosis


  Family history: Diabetes mellitus


  Family history: Gastrointestinal disease


  Family history: Glaucoma


  Family history: Hypertension


  Family history: Osteoporosis


  Family history: Thyroid disorder


  Headache


  Hearing loss


  Heart disease


  Hereditary disease


  History of - anemia


  History of - disorder


  History of - respiratory disease


  History of drug abuse


  Human immunodeficiency virus (HIV) seropositivity


  Hypercholesterolemia


  Infertile


  Kidney disease


  Malignant neoplasm of lung


  Myocardial infarction


  Parkinson's disease


  Psychotic disorder


  Seizure disorder


  Stroke


  Tuberculosis


  Visual impairment





Physical Exam-Suspected Sepsis


Physical Exam


Vital Signs





Vital Sign - Last 12Hours








 17





 16:10


 


Temp 101.0


 


Pulse 82


 


Resp 18


 


B/P (MAP) 136/64


 


Pulse Ox 98


 


O2 Delivery Nasal Cannula


 


O2 Flow Rate 2.00





Capillary Refill :


General Appearance:  No Apparent Distress, WD/WN


HEENT:  PERRL/EOMI, Pharynx Normal


Neck:  Non Tender, Supple


Respiratory:  Lungs Clear, Normal Breath Sounds


Cardiovascular:  Regular Rate, Rhythm, No Murmur


Gastrointestinal:  Non Tender, Soft


Back:  Normal Inspection, No CVA Tenderness, No Vertebral Tenderness


Extremity:  Normal Range of Motion, Non Tender


Neurologic/Psychiatric:  Alert, Other (disoriented to situation and time)


Skin:  warm/dry, other (bilateral lower extremity erythema and swelling 

concerning for cellulitis.)





Focused Exam


Lactic Acid Level





Laboratory Tests








Test


  17


16:10


 


Lactic Acid Level


  2.85 MMOL/L


(0.50-2.00)  *H











Progress/Results/Core Measures


Suspected Sepsis


SIRS


Temperature: 


Pulse:  


Respiratory Rate: 


 


Laboratory Tests


17 16:10: White Blood Count 13.7H


Blood Pressure  / 


Mean: 


 





Laboratory Tests


17 16:10: 


Creatinine 1.00, INR Comment 1.2, Platelet Count 212, Total Bilirubin 0.3








Results/Orders


Lab Results





Laboratory Tests








Test


  17


16:10 17


17:10 Range/Units


 


 


White Blood Count


  13.7 H


  


  4.3-11.0


10^3/uL


 


Red Blood Count


  3.96 L


  


  4.35-5.85


10^6/uL


 


Hemoglobin 10.5 L  11.5-16.0  G/DL


 


Hematocrit 33 L  35-52  %


 


Mean Corpuscular Volume 83   80-99  FL


 


Mean Corpuscular Hemoglobin 27   25-34  PG


 


Mean Corpuscular Hemoglobin


Concent 32 


  


  32-36  G/DL


 


 


Red Cell Distribution Width 14.6 H  10.0-14.5  %


 


Platelet Count


  212 


  


  130-400


10^3/uL


 


Mean Platelet Volume 11.2 H  7.4-10.4  FL


 


Neutrophils (%) (Auto) 83 H  42-75  %


 


Lymphocytes (%) (Auto) 7 L  12-44  %


 


Monocytes (%) (Auto) 10   0-12  %


 


Eosinophils (%) (Auto) 0   0-10  %


 


Basophils (%) (Auto) 0   0-10  %


 


Neutrophils # (Auto) 11.3 H  1.8-7.8  X 10^3


 


Lymphocytes # (Auto) 1.0   1.0-4.0  X 10^3


 


Monocytes # (Auto) 1.3 H  0.0-1.0  X 10^3


 


Eosinophils # (Auto)


  0.0 


  


  0.0-0.3


10^3/uL


 


Basophils # (Auto)


  0.1 


  


  0.0-0.1


10^3/uL


 


Neutrophils % (Manual) 89    %


 


Lymphocytes % (Manual) 8    %


 


Monocytes % (Manual) 2    %


 


Eosinophils % (Manual) 1    %


 


Basophils % (Manual) 0    %


 


Band Neutrophils 0    %


 


Blood Morphology Comment NORMAL    


 


Prothrombin Time 14.5   12.2-14.7  SEC


 


INR Comment 1.2   0.8-1.4  


 


Activated Partial


Thromboplast Time 31 


  


  24-35  SEC


 


 


Sodium Level 138   135-145  MMOL/L


 


Potassium Level 4.3   3.6-5.0  MMOL/L


 


Chloride Level 103     MMOL/L


 


Carbon Dioxide Level 23   21-32  MMOL/L


 


Anion Gap 12   5-14  MMOL/L


 


Blood Urea Nitrogen 27 H  7-18  MG/DL


 


Creatinine


  1.00 


  


  0.60-1.30


MG/DL


 


Estimat Glomerular Filtration


Rate 55 


  


   


 


 


BUN/Creatinine Ratio 27    


 


Glucose Level 196 H    MG/DL


 


Lactic Acid Level


  2.85 *H


  


  0.50-2.00


MMOL/L


 


Calcium Level 9.5   8.5-10.1  MG/DL


 


Total Bilirubin 0.3   0.1-1.0  MG/DL


 


Aspartate Amino Transf


(AST/SGOT) 20 


  


  5-34  U/L


 


 


Alanine Aminotransferase


(ALT/SGPT) 18 


  


  0-55  U/L


 


 


Alkaline Phosphatase 80     U/L


 


Total Protein 6.8   6.4-8.2  G/DL


 


Albumin 3.7   3.2-4.5  G/DL


 


Urine Color  YELLOW   


 


Urine Clarity  CLEAR   


 


Urine pH  5  5-9  


 


Urine Specific Gravity  1.020  1.016-1.022  


 


Urine Protein  2+ H NEGATIVE  


 


Urine Glucose (UA)  NEGATIVE  NEGATIVE  


 


Urine Ketones  1+ H NEGATIVE  


 


Urine Nitrite  NEGATIVE  NEGATIVE  


 


Urine Bilirubin  3+ H NEGATIVE  


 


Urine Urobilinogen  1  NORMAL  MG/DL


 


Urine Leukocyte Esterase  1+ H NEGATIVE  


 


Urine RBC (Auto)  NEGATIVE  NEGATIVE  


 


Urine RBC  NONE   /HPF


 


Urine WBC  2-5   /HPF


 


Urine Crystals  NONE   /LPF


 


Urine Bacteria  FEW H  /HPF


 


Urine Casts  NONE   /LPF


 


Urine Mucus  SMALL H  /LPF


 


Urine Culture Indicated  NO   








My Orders





Orders - ENOCH VARELA MD


Cbc With Automated Diff (17 16:05)


Comprehensive Metabolic Panel (17 16:05)


Lactic Acid Analyzer (17 16:05)


Blood Culture (17 16:05)


Sputum Culture (17 16:05)


Ua Culture If Indicated (17 16:05)


Protime With Inr (17 16:05)


Partial Thromboplastin Time (17 16:05)


Chest 1 View, Ap/Pa Only (17 16:05)


O2 (17 16:05)


Saline Lock/Iv-Start (17 16:05)


Saline Lock/Iv-Start (17 16:05)


Vital Signs Adult Sepsis Patie Q1HR (17 16:05)


Remove Rings In Anticipation O (17 16:05)


Ct Head Wo (17 16:05)


Hip, Right, 2 Views (17 16:05)


Manual Differential (17 16:10)


Acetaminophen  Tablet (Tylenol  Tablet) (17 16:47)


Ns Iv 1000 Ml (Sodium Chloride 0.9%) (17 17:13)


Ceftriaxone Injection (Rocephin Injectio (17 18:00)





Medications Given in ED





Current Medications








 Medications  Dose


 Ordered  Sig/Raiz


 Route  Start Time


 Stop Time Status Last Admin


Dose Admin


 


 Ceftriaxone


 Sodium 1000 mg/


 Sodium Chloride  50 ml @ 


 100 mls/hr  ONCE  ONCE


 IV  17 18:00


 17 18:29 DC 17 18:05


100 MLS/HR


 


 Sodium Chloride  1,000 ml @ 


 0 mls/hr  Q0M ONCE


 IV  17 17:13


 17 17:14 DC 17 17:19


1,000 MLS/HR








Vital Signs/I&O





Vital Sign - Last 12Hours








 17





 16:10 16:10


 


Temp  101.0


 


Pulse  82


 


Resp  18


 


B/P (MAP)  136/64


 


Pulse Ox  98


 


O2 Delivery Nasal Cannula Room Air


 


O2 Flow Rate 2.00 





Capillary Refill :


Progress Note :  


Progress Note


Seen and evaluated on arrival by EMS.  IV initiated by EMS.  Labs, blood 

cultures, lactic acid, UA, chest x-ray ordered.  Normal saline 500 mL bolus 

initiated by EMS continued.  Acetaminophen 1000 mg by mouth given.  1700: 

Normal saline 1 L bolus ordered.  Lactic acid elevated greater than 2 less than 

4.  Patient is not hypotensive and does not currently have signs of septic 

shock but does have severe sepsis.  Patient to be admitted.  1755: Rocephin 1 g 

IV.  Case discussed with Dr. Collins.  He will accept patient for admission, 

inpatient status.  We will continue Rocephin and clindamycin for cellulitis.  

Discussed with patient who agrees with plan.





Diagnostic Imaging





   Diagonstic Imaging:  CT


   Plain Films/CT/US/NM/MRI:  head


Comments


NAME:      HOMERO VERONICA


[facility] REC#:   [id number]


ACCOUNT#:   [id number]


PT STATUS:   REG ER


:      1950


PHYSICIAN:    ENOCH VARELA MD


ADMIT DATE:   17/ER


 ***Signed***


Date of Exam:   17





CT HEAD WO


 





PROCEDURE: CT head without contrast.





TECHNIQUE: Multiple contiguous axial images were obtained through


the brain without the use of intravenous contrast.





INDICATION: Fall with hallucinations, no loss of consciousness.





COMPARISON: 7/3/2015.





DISCUSSION: No adverse interval change. Diffuse brain volume loss


is stable, likely age related. White matter hypoattenuation is


nonspecific though not greater than expected for age related


chronic small vessel ischemic disease, stable. No acute


intracranial hemorrhage, mass, midline shift, or hydrocephalus.


The visualized orbits, paranasal sinuses, mastoid air cells, and


calvarium are unremarkable. 





IMPRESSION:


1. Stable senescent changes as described. No acute intracranial


abnormality identified.





Dictated by: 





  Dictated on workstation # [id number]





[id number]





Dict:      17 1630


Trans:      17





Interpreted by:         MIC TINEO MD


Electronically signed by:   MIC TINEO MD 17








   Diagonstic Imaging:  Xray


   Plain Films/CT/US/NM/MRI:  chest


Comments


NAME:      HOMERO VERONICA


[facility] REC#:   [id number]


ACCOUNT#:   [id number]


PT STATUS:   REG ER


:      1950


PHYSICIAN:    ENOCH VARELA MD


ADMIT DATE:   17/ER


 ***Signed***


Date of Exam:   17





CHEST 1 VIEW, AP/PA ONLY


 





INDICATION: Right-sided pain after fall last night.





DISCUSSION: Single portable upright view of the chest was


obtained, comparison 2017. No focal consolidation, pleural


fluid, or pneumothorax. Old left rib fractures stable. Stable


normal heart size.





IMPRESSION:


1. Negative portable chest.





Dictated by: 





  Dictated on workstation # [id number]





[id number]





Dict:      17 1647


Trans:      17 1652





Interpreted by:         MIC TINEO MD


Electronically signed by:   MIC TINEO MD 17 1652








   Diagonstic Imaging:  Xray


   Plain Films/CT/US/NM/MRI:  hip


Comments


NAME:      HOMERO VERONICA


[facility] REC#:   [id number]


ACCOUNT#:   [id number]


PT STATUS:   REG ER


:      1950


PHYSICIAN:    ENOCH VARELA MD


ADMIT DATE:   17/ER


 ***Signed***


Date of Exam:   17





HIP, RIGHT, 2 VIEWS


 





INDICATION: Right hip pain.





AP and oblique views of the right hip are obtained.





FINDINGS: Right hip prosthesis is in place. There is some


heterotopic bone lateral to the hip joint. There is no acute


fracture or device loosening.





IMPRESSION:





Right hip prosthesis in place as above. Some heterotopic bone is


noted lateral to the hip joint. There is no acute fracture or


device loosening.





Dictated by: 





  Dictated on workstation # [id number]





[id number]





Dict:      17 1646


Trans:      17 1700





Interpreted by:         LISETH GUPTA MD


Electronically signed by:   LISETH GUPTA MD 17 1700





Departure


Communication


Time/Spoke to Admitting Phy:  17:55





Impression


Impression:  


 Primary Impression:  


 Cellulitis of both lower extremities


 Additional Impression:  


 Severe sepsis


Disposition:  09 ADMITTED AS INPATIENT


Condition:  Stable


Decision to Admit Reason:  Admit from ER (General)


Decision to Admit/Date:  May 5, 2017


Time/Decision to Admit Time:  17:55





Departure-Patient Inst.


Referrals:  


LELAND DACOSTA DO (PCP/Family)


Primary Care Physician











ENOCH VARELA MD May 5, 2017 16:08

## 2017-05-06 VITALS — SYSTOLIC BLOOD PRESSURE: 110 MMHG | DIASTOLIC BLOOD PRESSURE: 55 MMHG

## 2017-05-06 VITALS — DIASTOLIC BLOOD PRESSURE: 81 MMHG | SYSTOLIC BLOOD PRESSURE: 152 MMHG

## 2017-05-06 VITALS — DIASTOLIC BLOOD PRESSURE: 70 MMHG | SYSTOLIC BLOOD PRESSURE: 158 MMHG

## 2017-05-06 VITALS — DIASTOLIC BLOOD PRESSURE: 66 MMHG | SYSTOLIC BLOOD PRESSURE: 140 MMHG

## 2017-05-06 VITALS — DIASTOLIC BLOOD PRESSURE: 49 MMHG | SYSTOLIC BLOOD PRESSURE: 101 MMHG

## 2017-05-06 VITALS — DIASTOLIC BLOOD PRESSURE: 65 MMHG | SYSTOLIC BLOOD PRESSURE: 144 MMHG

## 2017-05-06 VITALS — DIASTOLIC BLOOD PRESSURE: 60 MMHG | SYSTOLIC BLOOD PRESSURE: 132 MMHG

## 2017-05-06 VITALS — SYSTOLIC BLOOD PRESSURE: 128 MMHG | DIASTOLIC BLOOD PRESSURE: 60 MMHG

## 2017-05-06 VITALS — DIASTOLIC BLOOD PRESSURE: 68 MMHG | SYSTOLIC BLOOD PRESSURE: 134 MMHG

## 2017-05-06 LAB
ALBUMIN SERPL-MCNC: 3.1 G/DL (ref 3.2–4.5)
ALT SERPL-CCNC: 18 U/L (ref 0–55)
ANION GAP SERPL CALC-SCNC: 9 MMOL/L (ref 5–14)
AST SERPL-CCNC: 25 U/L (ref 5–34)
BASOPHILS # BLD AUTO: 0.1 10^3/UL (ref 0–0.1)
BASOPHILS NFR BLD AUTO: 1 % (ref 0–10)
BILIRUB SERPL-MCNC: 0.3 MG/DL (ref 0.1–1)
BUN SERPL-MCNC: 23 MG/DL (ref 7–18)
BUN/CREAT SERPL: 28
CALCIUM SERPL-MCNC: 8.9 MG/DL (ref 8.5–10.1)
CHLORIDE SERPL-SCNC: 107 MMOL/L (ref 98–107)
CO2 SERPL-SCNC: 24 MMOL/L (ref 21–32)
CREAT SERPL-MCNC: 0.81 MG/DL (ref 0.6–1.3)
EOSINOPHIL # BLD AUTO: 0.7 10^3/UL (ref 0–0.3)
EOSINOPHIL NFR BLD AUTO: 7 % (ref 0–10)
ERYTHROCYTE [DISTWIDTH] IN BLOOD BY AUTOMATED COUNT: 14.7 % (ref 10–14.5)
GFR SERPLBLD BASED ON 1.73 SQ M-ARVRAT: > 60 ML/MIN
GLUCOSE SERPL-MCNC: 137 MG/DL (ref 70–105)
LYMPHOCYTES # BLD AUTO: 1.5 X 10^3 (ref 1–4)
LYMPHOCYTES NFR BLD AUTO: 15 % (ref 12–44)
MCH RBC QN AUTO: 27 PG (ref 25–34)
MCHC RBC AUTO-ENTMCNC: 32 G/DL (ref 32–36)
MCV RBC AUTO: 85 FL (ref 80–99)
MONOCYTES # BLD AUTO: 1.8 X 10^3 (ref 0–1)
MONOCYTES NFR BLD AUTO: 17 % (ref 0–12)
NEUTROPHILS # BLD AUTO: 6.4 X 10^3 (ref 1.8–7.8)
NEUTROPHILS NFR BLD AUTO: 61 % (ref 42–75)
PLATELET # BLD: 177 10^3/UL (ref 130–400)
PMV BLD AUTO: 11.3 FL (ref 7.4–10.4)
POTASSIUM SERPL-SCNC: 4.1 MMOL/L (ref 3.6–5)
PROT SERPL-MCNC: 5.9 G/DL (ref 6.4–8.2)
RBC # BLD AUTO: 3.75 10^6/UL (ref 4.35–5.85)
SODIUM SERPL-SCNC: 140 MMOL/L (ref 135–145)
WBC # BLD AUTO: 10.5 10^3/UL (ref 4.3–11)

## 2017-05-06 RX ADMIN — CLINDAMYCIN PHOSPHATE SCH MLS/HR: 150 INJECTION, SOLUTION INTRAMUSCULAR; INTRAVENOUS at 07:42

## 2017-05-06 RX ADMIN — INSULIN HUMAN SCH UNIT: 100 INJECTION, SOLUTION PARENTERAL at 07:48

## 2017-05-06 RX ADMIN — SODIUM CHLORIDE SCH MLS/HR: 900 INJECTION, SOLUTION INTRAVENOUS at 11:13

## 2017-05-06 RX ADMIN — ACETAMINOPHEN PRN MG: 500 TABLET ORAL at 20:19

## 2017-05-06 RX ADMIN — CLINDAMYCIN PHOSPHATE SCH MLS/HR: 150 INJECTION, SOLUTION INTRAMUSCULAR; INTRAVENOUS at 21:52

## 2017-05-06 RX ADMIN — SODIUM CHLORIDE SCH MLS/HR: 900 INJECTION, SOLUTION INTRAVENOUS at 17:17

## 2017-05-06 RX ADMIN — INSULIN HUMAN SCH UNIT: 100 INJECTION, SOLUTION PARENTERAL at 15:16

## 2017-05-06 RX ADMIN — ENOXAPARIN SODIUM SCH MG: 100 INJECTION SUBCUTANEOUS at 13:47

## 2017-05-06 RX ADMIN — ACETAMINOPHEN PRN MG: 500 TABLET ORAL at 14:12

## 2017-05-06 RX ADMIN — INSULIN HUMAN SCH UNIT: 100 INJECTION, SOLUTION PARENTERAL at 22:47

## 2017-05-06 RX ADMIN — ALPRAZOLAM PRN MG: 0.25 TABLET ORAL at 17:16

## 2017-05-06 RX ADMIN — SODIUM CHLORIDE SCH MLS/HR: 900 INJECTION, SOLUTION INTRAVENOUS at 07:42

## 2017-05-06 RX ADMIN — INSULIN HUMAN SCH UNIT: 100 INJECTION, SOLUTION PARENTERAL at 10:18

## 2017-05-06 RX ADMIN — CLINDAMYCIN PHOSPHATE SCH MLS/HR: 150 INJECTION, SOLUTION INTRAMUSCULAR; INTRAVENOUS at 13:47

## 2017-05-06 NOTE — HISTORY & PHYSICAL
History of Present Illness


History of Present Illness


Reason for visit/HPI


67-year-old female admitted for sepsis due to cellulitis in bilateral lower 

extremities.  Pt has history of DMII, HTN, PAD, anxiety, depression.   Patient 

has a history of bilateral lower extremity cellulitis in which she has seen 

wound care back in 2017.  Most recently Patient was seen on 2017 at [facility] ER for cellulitis and that she was prescribed Augmentin.  

Pt reports she has taken the augmentin,  ER reports that she did not start it.  


Patient currently lives alone and her sister checks on her either every day or 

every other day patient; also has [employer] that comes and checks on 

her as well.  Home health wanted her legs to be evaluated.   Patient did not go 

to her PCP Dr. Dacosta's office because she thought he was closed on .

   Pt reports she did fall earlier in the week and hurt her left side/hip-  

Imaging was negative for acute fracture.  


Patient has bilateral leg discomfort where her cellulitis is.  It is warm to 

touch and she has felt warm.  Patient did have a fever in the ER 101F.  Lactic 

acid was >2 but was normal on recheck after IVF.  She was started on 

clindamycin and rocephin.


Some confusion was noted by EMS but appears to be resolved.


No fevers overnight.  Pt reports she is urinating okay-  does require 

assistance to get up to bedside commode.


Date of Admission


May 5, 2017 at 18:30


I consulted on this patient on


17


 11:44


Attending Physician


Juan Loo MD


Admitting Physician


Leland Dacosta DO


Consult








Allergies and Home Medications


Allergies


Coded Allergies:  


     No Known Drug Allergies (Unverified , 3/11/14)





Home Medications


Acetaminophen 650 Mg Tablet.er, 650-1,200 MG PO Q4H PRN for PAIN, (Reported)


Alprazolam 0.25 Mg Tablet, 0.25 MG PO BID PRN for ANXIETY, (Reported)


Amoxicillin/Potassium Clav 1 Each Tablet, 1 EACH PO BID, #14


   Prescribed by: LELAND DACOSTA on 17 [id number]


Calcium Carbonate 200 Mg Tab.chew, 400 MG PO QID PRN for INDIGESTION, (Reported)


Cephalexin 500 Mg Capsule, 500 MG PO QID, #40


   Prescribed by: KARMEN CREWS on 3/7/17 2128


Duloxetine HCl 60 Mg Capsule.dr, 30 MG PO DAILY, (Reported)


   LAST FILLED #30 16 


Esomeprazole Magnesium 40 Mg Cap, 40 MG PO DAILY, (Reported)


   LAST FILLED #90 10-18-16 


Furosemide 40 Mg Tablet, 40 MG PO DAILY, (Reported)


Ibuprofen 200 Mg Tablet, 600 MG PO TID PRN for PAIN, (Reported)


Insulin Aspart 300 Units/3 Ml Solution, SQ QID, (Reported)


    = 0 UNITS 201-250 = 3 UNITS 251-300 = 5 UNITS 301-350 = 7 UNITS 351-

400 = 9 UNITS 


Insulin Glargine,Hum.rec.anlog 100 Unit/1 Ml Insuln.pen, 40 UNITS SQ BID, (

Reported)


   HOLD IF BLOOD SUGAR IS 95 OR BELOW 


Iron Polysaccharide Complex 150 Mg Capsule, 150 MG PO BID, (Reported)


Loperamide HCl 2 Mg Tablet, 2 MG PO UD PRN for DIARRHEA, (Reported)


Loratadine 10 Mg Tablet, 10 MG PO DAILY, (Reported)


Metformin HCl 1,000 Mg Tablet, 1,000 MG PO BID, (Reported)


   LAST FILLED #180 16 


Polyethylene Glycol 3350 17 Gm Powd.pack, 17 GM PO DAILY PRN for CONSTIPATION, (

Reported)


Pregabalin 150 Mg Capsule, 150 MG PO TID, (Reported)


   LAST FILLED #90 16 


Ropinirole HCl 2 Mg Tablet, 2 MG PO BID, (Reported)


   LAST FILLED #60 16 


Rosuvastatin Calcium 10 Mg Tablet, 10 MG PO DAILY, (Reported)


Sulfamethoxazole/Trimethoprim 1 Each Tablet, 1 EACH PO BID, #20 Ref 0


   Prescribed by: SEDRICK TORREZ on 17 1646


Tolnaftate 45 Gm Powder, TP DAILY, (Reported)


Tramadol HCl 50 Mg Tablet, 50 MG PO TID, (Reported)


   LAST FILLED #63 16 





Past Medical-Social-Family Hx


Patient Social History


Alcohol Use:  Denies Use


Recreational Drug Use:  No


Smoking Status:  Former Smoker


Former smoker/When Quit:  1992


Type Used:  Cigarettes


Physical Abuse Screen:  No


Sexual Abuse:  No


Recent Foreign Travel:  No


Contact w/other who traveled:  No


Recent Hopitalizations:  Yes (2017-CELLULITIS)


Recent Infectious Disease Expo:  No





Immunizations Up To Date


Tetanus Booster (TDap):  Less than 5yrs


Date of Pneumonia Vaccine:  Sep 12, 2012


Date of Influenza Vaccine:  Sep 18, 2016





Seasonal Allergies


Seasonal Allergies:  No





Surgeries


HX Surgeries:  Yes (HEMORRHOIDECTOMY, L HIP replacement, reduction left hip 

dislocation x2)


Surgeries:  Gallbladder, Joint Replacement, Orthopedic





Respiratory


Hx Respiratory Disorders:  No





Cardiovascular


Hx Cardiovascular Disorders:  Yes (CHF)


Cardiac Disorders:  Hypertension





Neurological


Hx Neurological Disorders:  Yes (RLS)


Neurological Disorders:  Neuropathy





Reproductive System


Hx Reproductive Disorders:  No


Sexually Transmitted Disease:  No


HIV/AIDS:  No





Genitourinary


Hx Genitourinary Disorders:  No





Gastrointestinal


Hx Gastrointestinal Disorders:  Yes


Gastrointestinal Disorders:  Chronic Constipation, Hemorrhoids





Musculoskeletal


Hx Musculoskeletal Disorders:  Yes (school age /  accident)


Musculoskeletal Disorders:  Arthritis, Back Injury, Chronic Back Pain





Endocrine


Hx Endocrine Disorders:  Yes (Lantus/ Novolog)


Endocrine Disorders:  Diabetes, Insulin dep





HEENT


HX ENT Disorders:  Yes (report the start of cataracts, migrains)


HEENT Disorders:  Cataract


Loss of Vision:  Bilateral


Hearing Impairment:  Denies





Cancer


Hx Cancer:  No





Psychosocial


Hx Psychiatric Problems:  Yes


Behavioral Health Disorders:  Anxiety, Depression





Integumentary


HX Skin/Integumentary Disorder:  Yes (SEEING WOUND CARE FOR WOUND ON FEET, 

history of cellulitis)





Blood Transfusions


Hx Blood Disorders:  No


Adverse Reaction to a Blood Tr:  No





Reviewed Nursing Assessment


Reviewed/Agree w Nursing PMH:  Yes





Family Medical History


Significant Family History:  No Pertinent Family Hx


Family Hx:  


Congestive heart failure


  03 FATHER


Prostate cancer


  03 FATHER





No Family History of:


  Abdominal aortic aneurysm


  Jefferson's disease


  Alcoholism


  Aphasia


  Cancer


  Cancer of colon


  Cataract


  Congenital heart disease


  Cystic fibrosis


  Dementia


  Dysphagia


  Family history: Allergy


  Family history: Alzheimer's disease


  Family history: Arthritis


  Family history: Asthma


  Family history: Breast disease


  Family history: Cardiovascular disease


  Family history: Coronary thrombosis


  Family history: Diabetes mellitus


  Family history: Gastrointestinal disease


  Family history: Glaucoma


  Family history: Hypertension


  Family history: Osteoporosis


  Family history: Thyroid disorder


  Headache


  Hearing loss


  Heart disease


  Hereditary disease


  History of - anemia


  History of - disorder


  History of - respiratory disease


  History of drug abuse


  Human immunodeficiency virus (HIV) seropositivity


  Hypercholesterolemia


  Infertile


  Kidney disease


  Malignant neoplasm of lung


  Myocardial infarction


  Parkinson's disease


  Psychotic disorder


  Seizure disorder


  Stroke


  Tuberculosis


  Visual impairment





Review of Systems


Review of Systems


General:  No Chills, No Night Sweats


HEENT:  No Head Aches, No Visual Changes


Pulmonary:  No Dyspnea, No Cough


Cardiovascular:  No: Chest Pain


Gastrointestinal:  Abdominal Pain (left side pain), No: Nausea, Vomiting


Genitourinary:  No Dysuria, No Frequency


Neurological:  Weakness





All Other Systems Reviewed


All Other Systems Reviewed:  Yes





Physical Exam


Vital Signs





Vital Sign - Last 12Hours








 17





 16:10


 


Temp 101.0


 


Pulse 82


 


Resp 18


 


B/P (MAP) 136/64


 


Pulse Ox 98


 


O2 Delivery Nasal Cannula


 


O2 Flow Rate 2.00





Capillary Refill : Less Than 3 Seconds 














Vital Signs








  Date Time  Temp Pulse Resp B/P (MAP) Pulse Ox O2 Delivery O2 Flow Rate FiO2


 


17 08:00 96.0 68 20 132/60 100 Nasal Cannula 3.00 


 


17 07:10  66      


 


17 06:00  64 18 101/49 99 Nasal Cannula 3.00 


 


17 05:00 96.2 74 18 128/60 99 Nasal Cannula 3.00 


 


17 04:00 97.6 64 18 110/55 99 Nasal Cannula 3.00 


 


17 03:00 96.8 70 19 140/66 96 Room Air  


 


17 00:30 98.4 79 19 134/68 99 Nasal Cannula 3.00 


 


17 22:19 97.0 77 20 132/56 99 Nasal Cannula 3.00 


 


17 22:00     99  3.00 


 


17 21:51     92   


 


17 21:51     92  2.00 


 


17 19:15 98.0 84 18  95   


 


17 16:10 101.0 82 18 136/64 98 Room Air  


 


17 16:10      Nasal Cannula 2.00 














I & O 


 


 17





 07:00


 


Intake Total 1350 ml


 


Output Total 550 ml


 


Balance 800 ml








General Appearance:  No Apparent Distress, WD/WN


HEENT:  PERRL/EOMI


Neck:  Non Tender, Supple


Respiratory:  Chest Non Tender, Lungs Clear, Normal Breath Sounds, No Accessory 

Muscle Use, No Respiratory Distress


Cardiovascular:  Regular Rate, Rhythm


Gastrointestinal:  Normal Bowel Sounds, Non Tender, Soft


Rectal:  Deferred


Back:  Normal Inspection


Extremity:  Non Tender, Other (left lower leg- edema 1+, woody -  warm to touch

, right lower leg- slightly warm, 1+edema)


Neurologic/Psychiatric:  Alert, Oriented x3, No Motor/Sensory Deficits, Normal 

Mood/Affect


Skin:  Normal Color, Warm/Dry, Other (see extremity)





Assessment/Plan


66 yo F





sepsis due to bilateral lower extremity cellulitis -  on rocephin, clindamycin-

  IVF


lactic acidosis- resolved


anemia- stable


history of congestive heart failure - monitor


DMII w/ hyperglycemia  -  continue home regimen


HTN  -monitor


PAD -monitor


anxiety- continue home meds


depression - continue home meds





Dispo:  home situation is not the best but she does have sister and [facility] home 

health checking on her.    Plan to reassess tomorrow- plan to d/c to home 17

-  likely will continue clindamycin course-  


stop ivf and rocephin


Problems:  





Clinical Quality Measures


DVT/VTE Risk/Contraindication:


Risk Factor Score Per Nursin


RFS Level Per Nursing on Admit:  4+=Very High











JUAN LOO MD May 6, 2017 12:11

## 2017-05-07 VITALS — SYSTOLIC BLOOD PRESSURE: 153 MMHG | DIASTOLIC BLOOD PRESSURE: 82 MMHG

## 2017-05-07 VITALS — DIASTOLIC BLOOD PRESSURE: 73 MMHG | SYSTOLIC BLOOD PRESSURE: 159 MMHG

## 2017-05-07 VITALS — SYSTOLIC BLOOD PRESSURE: 123 MMHG | DIASTOLIC BLOOD PRESSURE: 76 MMHG

## 2017-05-07 LAB
ALBUMIN SERPL-MCNC: 3.5 G/DL (ref 3.2–4.5)
ALT SERPL-CCNC: 21 U/L (ref 0–55)
ANION GAP SERPL CALC-SCNC: 9 MMOL/L (ref 5–14)
AST SERPL-CCNC: 23 U/L (ref 5–34)
BASOPHILS # BLD AUTO: 0.1 10^3/UL (ref 0–0.1)
BASOPHILS NFR BLD AUTO: 1 % (ref 0–10)
BILIRUB SERPL-MCNC: 0.2 MG/DL (ref 0.1–1)
BUN SERPL-MCNC: 11 MG/DL (ref 7–18)
BUN/CREAT SERPL: 15
CALCIUM SERPL-MCNC: 9.7 MG/DL (ref 8.5–10.1)
CHLORIDE SERPL-SCNC: 106 MMOL/L (ref 98–107)
CO2 SERPL-SCNC: 23 MMOL/L (ref 21–32)
CREAT SERPL-MCNC: 0.75 MG/DL (ref 0.6–1.3)
EOSINOPHIL # BLD AUTO: 0.8 10^3/UL (ref 0–0.3)
EOSINOPHIL NFR BLD AUTO: 7 % (ref 0–10)
ERYTHROCYTE [DISTWIDTH] IN BLOOD BY AUTOMATED COUNT: 14.4 % (ref 10–14.5)
GFR SERPLBLD BASED ON 1.73 SQ M-ARVRAT: > 60 ML/MIN
GLUCOSE SERPL-MCNC: 222 MG/DL (ref 70–105)
LYMPHOCYTES # BLD AUTO: 1 X 10^3 (ref 1–4)
LYMPHOCYTES NFR BLD AUTO: 9 % (ref 12–44)
MCH RBC QN AUTO: 27 PG (ref 25–34)
MCHC RBC AUTO-ENTMCNC: 32 G/DL (ref 32–36)
MCV RBC AUTO: 83 FL (ref 80–99)
MONOCYTES # BLD AUTO: 1.4 X 10^3 (ref 0–1)
MONOCYTES NFR BLD AUTO: 12 % (ref 0–12)
NEUTROPHILS # BLD AUTO: 8.1 X 10^3 (ref 1.8–7.8)
NEUTROPHILS NFR BLD AUTO: 72 % (ref 42–75)
PLATELET # BLD: 228 10^3/UL (ref 130–400)
PMV BLD AUTO: 11.3 FL (ref 7.4–10.4)
POTASSIUM SERPL-SCNC: 4.1 MMOL/L (ref 3.6–5)
PROT SERPL-MCNC: 6.9 G/DL (ref 6.4–8.2)
RBC # BLD AUTO: 4.11 10^6/UL (ref 4.35–5.85)
SODIUM SERPL-SCNC: 138 MMOL/L (ref 135–145)
WBC # BLD AUTO: 11.3 10^3/UL (ref 4.3–11)

## 2017-05-07 RX ADMIN — INSULIN HUMAN SCH UNIT: 100 INJECTION, SOLUTION PARENTERAL at 10:27

## 2017-05-07 RX ADMIN — CLINDAMYCIN PHOSPHATE SCH MLS/HR: 150 INJECTION, SOLUTION INTRAMUSCULAR; INTRAVENOUS at 13:17

## 2017-05-07 RX ADMIN — ENOXAPARIN SODIUM SCH MG: 100 INJECTION SUBCUTANEOUS at 12:39

## 2017-05-07 RX ADMIN — ALPRAZOLAM PRN MG: 0.25 TABLET ORAL at 08:55

## 2017-05-07 RX ADMIN — ACETAMINOPHEN PRN MG: 500 TABLET ORAL at 04:18

## 2017-05-07 RX ADMIN — INSULIN HUMAN SCH UNIT: 100 INJECTION, SOLUTION PARENTERAL at 06:41

## 2017-05-07 RX ADMIN — CLINDAMYCIN PHOSPHATE SCH MLS/HR: 150 INJECTION, SOLUTION INTRAMUSCULAR; INTRAVENOUS at 05:49

## 2017-05-07 NOTE — DISCHARGE INST-HOME HEALTH
Discharge Inst-to Home Health


Patient Instructions


Patient Instructions/FollowUp:  


call Dr. Sullivan's office in AM for follow up


pt reports she will get an appt with wound care.


complete clindamycin course


[facility] [place] [place]


DISCHARGE ORDERS


Allergies:  


Coded Allergies:  


     No Known Drug Allergies (Unverified , 3/11/14)


Height (Feet):  5


Height (Inches):  6.00


Weight (Pounds):  223


Weight (Ounces):  4.0





Home Health Need/Face to Face


Reason Pt Homebound


weakness, chronic comorbidities.


I Have Seen Pt Face-to-Face:  Yes


Date of Face to Face:  May 7, 2017


Discharged To:  Home


Diagnosis/Conditions HH Order:  


Resume current home health orders.


cellulitis bilateral lower extremities


weakness/debility


diabetes mellitus


anxiety


depression





Consult/Follow Up/New Order


*I certify that based on my findings, the following services are medically 

necessary Home Health Services:


Services:  Nursing Services, Physical Therapy-Evaluate & Treat, Wound Care-Eval/

Treat


My clinical findings support the need for the above services; see Diagnosis.


Dicharge Diet:  ADA Diet





Discharge Medications:


New, Converted, or Re-newed RX:  Transmited to Pharmacy


I certify that this patient is under my care and that I, a physician; had a 

face to face encounter that -meets the physician face to face encounter 

requirements with this patient as dated.











ALICIA LOO MD May 7, 2017 12:38

## 2017-05-07 NOTE — DISCHARGE SUMMARY
Diagnosis/Chief Complaint


Date of Admission


May 5, 2017 at 18:30


Date of Discharge


May 7th, 2017


Admission Diagnosis


Admission Diagnosis


sepsis due to bilateral lower extremity cellulitis -


lactic acidosis


anemia- 


history of congestive heart failure


DMII w/ hyperglycemia  -  


HTN  


PAD 


anxiety- 


depression -





Discharge Diagnosis


sepsis due to bilateral lower extremity cellulitis -


lactic acidosis


anemia- 


history of congestive heart failure


DMII w/ hyperglycemia  -  


HTN  


PAD 


anxiety- 


depression -





Reason Hospital Visit


67-year-old female admitted for sepsis due to cellulitis in bilateral lower 

extremities.  Pt has history of DMII, HTN, PAD, anxiety, depression.   Patient 

has a history of bilateral lower extremity cellulitis in which she has seen 

wound care back in 2017.  Most recently Patient was seen on 2017 at [facility] ER for cellulitis and that she was prescribed Augmentin.  

Pt reports she has taken the augmentin,  ER reports that she did not start it.  


Patient currently lives alone and her sister checks on her either every day or 

every other day patient; also has [employer] that comes and checks on 

her as well.  [employer] wanted her legs to be evaluated.   Patient did not go 

to her PCP Dr. Sullivan's office because she thought he was closed on .

   Pt reports she did fall earlier in the week and hurt her left side/hip-  

Imaging was negative for acute fracture.  


Patient has bilateral leg discomfort where her cellulitis is.  It is warm to 

touch and she has felt warm.  Patient did have a fever in the ER 101F.  Lactic 

acid was >2 but was normal on recheck after IVF.  She was started on 

clindamycin and rocephin.


Some confusion was noted by EMS but appears to be resolved.


No fevers overnight.  Pt reports she is urinating okay-  does require 

assistance to get up to bedside commode.





Discharge Summary


Hospital Course


Hospital Course


Patient was admitted for cellulitis-  she received IV antibiotics and continued 

to improve-  no fevers for 48hours.  Blood culture NGTD.  She was continued on 

her insulin for her diabetes.  Lactic acidosis resolved. Anemia was stable.  


Patient deemed stable for discharge 17-  she will complete her course of 

clindamycin.


She will follow up with Dr. Sullivan this week and check in with Wound clinic.


She has [employer] and will continue with them-  she needs to work on 

being ambulatory and home health PT to continue working with her.


Labs


Laboratory Tests


17 16:10: 


White Blood Count 13.7H, Red Blood Count 3.96L, Hemoglobin 10.5L, Hematocrit 33L

, Red Cell Distribution Width 14.6H, Mean Platelet Volume 11.2H, Neutrophils (%

) (Auto) 83H, Lymphocytes (%) (Auto) 7L, Neutrophils # (Auto) 11.3H, Monocytes 

# (Auto) 1.3H, Blood Urea Nitrogen 27H, Glucose Level 196H, Lactic Acid Level 

2.85*H


17 17:10: 


Urine Protein 2+H, Urine Ketones 1+H, Urine Bilirubin 3+H, Urine Leukocyte 

Esterase 1+H, Urine Bacteria FEWH, Urine Mucus SMALLH


17 18:52: 


17 06:05: 


Red Blood Count 3.75L, Hemoglobin 10.0L, Hematocrit 32L, Red Cell Distribution 

Width 14.7H, Mean Platelet Volume 11.3H, Monocytes # (Auto) 1.8H, Blood Urea 

Nitrogen 23H, Glucose Level 137H, Monocytes (%) (Auto) 17H, Eosinophils # (Auto

) 0.7H, Total Protein 5.9L, Albumin 3.1L


17 07:48: Glucometer 140H


17 10:04: Glucometer 169H


17 14:57: Glucometer 240H


17 22:32: Glucometer 152H


17 05:30: 


White Blood Count 11.3H, Red Blood Count 4.11L, Hemoglobin 11.0L, Hematocrit 34L

, Mean Platelet Volume 11.3H, Lymphocytes (%) (Auto) 9L, Neutrophils # (Auto) 

8.1H, Monocytes # (Auto) 1.4H, Eosinophils # (Auto) 0.8H, Glucose Level 222H


17 10:09: Glucometer 160H





Procedures


None.





Discharge Physical Examination


Allergies:  


Coded Allergies:  


     No Known Drug Allergies (Unverified , 3/11/14)


Vitals & I&Os





Vital Signs








  Date Time  Temp Pulse Resp B/P (MAP) Pulse Ox O2 Delivery O2 Flow Rate FiO2


 


17 14:10        


 


17 08:30 97.6 77 18  98 Room Air  


 


17 15:10       3.00 








General Appearance:  Alert, Oriented X3


HEENT:  Atraumatic


Respiratory:  Clear to Auscultation


Cardiovascular:  Regular Rate


Abdominal:  Normal Bowel Sounds, Soft


Extremities:  Other (trace pitting edema- legs)


Skin:  Other (cellulitis of lower extremities- improving- minimally warm- no 

drainage or open sores.)


Neuro:  Other (weak needs help standing- walks slow with a walker.)


Psych/Mental Status:  Mental Status NL





Discharge


Home Medications


Reviewed and agree with Discharge Medication list on patient's Discharge 

Instruction sheet


Instructions to Patient/Family


Please see electronic discharge instructions given to patient.





Clinical Quality Measures


DVT/VTE Risk/Contraindication:


Risk Factor Score Per Nursin


RFS Level Per Nursing on Admit:  4+=Very High











ALICIA LOO MD May 7, 2017 12:44

## 2017-05-09 ENCOUNTER — HOSPITAL ENCOUNTER (INPATIENT)
Dept: HOSPITAL 75 - IRF | Age: 67
LOS: 14 days | Discharge: HOME HEALTH | DRG: 948 | End: 2017-05-23
Attending: PHYSICAL MEDICINE & REHABILITATION | Admitting: PHYSICAL MEDICINE & REHABILITATION
Payer: MEDICARE

## 2017-05-09 ENCOUNTER — HOSPITAL ENCOUNTER (EMERGENCY)
Dept: HOSPITAL 75 - ER | Age: 67
Discharge: OTHER INSTITUTION | End: 2017-05-09
Payer: MEDICARE

## 2017-05-09 VITALS — WEIGHT: 213 LBS | HEIGHT: 66 IN | BODY MASS INDEX: 34.23 KG/M2

## 2017-05-09 VITALS — SYSTOLIC BLOOD PRESSURE: 123 MMHG | DIASTOLIC BLOOD PRESSURE: 65 MMHG

## 2017-05-09 VITALS — SYSTOLIC BLOOD PRESSURE: 144 MMHG | DIASTOLIC BLOOD PRESSURE: 95 MMHG

## 2017-05-09 VITALS — BODY MASS INDEX: 32.14 KG/M2 | WEIGHT: 200 LBS | HEIGHT: 66 IN

## 2017-05-09 VITALS — DIASTOLIC BLOOD PRESSURE: 65 MMHG | SYSTOLIC BLOOD PRESSURE: 108 MMHG

## 2017-05-09 DIAGNOSIS — R53.1: Primary | ICD-10-CM

## 2017-05-09 DIAGNOSIS — Z79.899: ICD-10-CM

## 2017-05-09 DIAGNOSIS — E11.9: ICD-10-CM

## 2017-05-09 DIAGNOSIS — Z79.4: ICD-10-CM

## 2017-05-09 DIAGNOSIS — Z87.891: ICD-10-CM

## 2017-05-09 DIAGNOSIS — F32.9: ICD-10-CM

## 2017-05-09 DIAGNOSIS — F41.9: ICD-10-CM

## 2017-05-09 DIAGNOSIS — I10: ICD-10-CM

## 2017-05-09 DIAGNOSIS — I87.2: ICD-10-CM

## 2017-05-09 DIAGNOSIS — Z79.84: ICD-10-CM

## 2017-05-09 DIAGNOSIS — E87.5: ICD-10-CM

## 2017-05-09 DIAGNOSIS — R09.02: ICD-10-CM

## 2017-05-09 DIAGNOSIS — R41.0: ICD-10-CM

## 2017-05-09 DIAGNOSIS — R53.81: ICD-10-CM

## 2017-05-09 LAB
ALBUMIN SERPL-MCNC: 3.9 G/DL (ref 3.2–4.5)
ALT SERPL-CCNC: 21 U/L (ref 0–55)
ANION GAP SERPL CALC-SCNC: 11 MMOL/L (ref 5–14)
AST SERPL-CCNC: 19 U/L (ref 5–34)
BASOPHILS # BLD AUTO: 0.1 10^3/UL (ref 0–0.1)
BASOPHILS NFR BLD AUTO: 1 % (ref 0–10)
BILIRUB SERPL-MCNC: 0.3 MG/DL (ref 0.1–1)
BILIRUB UR QL STRIP: NEGATIVE
BUN SERPL-MCNC: 16 MG/DL (ref 7–18)
BUN/CREAT SERPL: 19
CALCIUM SERPL-MCNC: 10.4 MG/DL (ref 8.5–10.1)
CHLORIDE SERPL-SCNC: 98 MMOL/L (ref 98–107)
CO2 SERPL-SCNC: 27 MMOL/L (ref 21–32)
CREAT SERPL-MCNC: 0.86 MG/DL (ref 0.6–1.3)
EOSINOPHIL # BLD AUTO: 0.4 10^3/UL (ref 0–0.3)
EOSINOPHIL NFR BLD AUTO: 4 % (ref 0–10)
ERYTHROCYTE [DISTWIDTH] IN BLOOD BY AUTOMATED COUNT: 14.5 % (ref 10–14.5)
GFR SERPLBLD BASED ON 1.73 SQ M-ARVRAT: > 60 ML/MIN
GLUCOSE SERPL-MCNC: 162 MG/DL (ref 70–105)
HS C REACTIVE PROTEIN: 1.5 MG/DL (ref 0–0.5)
KETONES UR QL STRIP: (no result)
LEUKOCYTE ESTERASE UR QL STRIP: NEGATIVE
LYMPHOCYTES # BLD AUTO: 1.1 X 10^3 (ref 1–4)
LYMPHOCYTES NFR BLD AUTO: 10 % (ref 12–44)
MAGNESIUM SERPL-MCNC: 1.5 MG/DL (ref 1.8–2.4)
MCH RBC QN AUTO: 26 PG (ref 25–34)
MCHC RBC AUTO-ENTMCNC: 33 G/DL (ref 32–36)
MCV RBC AUTO: 81 FL (ref 80–99)
MONOCYTES # BLD AUTO: 1.1 X 10^3 (ref 0–1)
MONOCYTES NFR BLD AUTO: 10 % (ref 0–12)
NEUTROPHILS # BLD AUTO: 9.1 X 10^3 (ref 1.8–7.8)
NEUTROPHILS NFR BLD AUTO: 77 % (ref 42–75)
NITRITE UR QL STRIP: NEGATIVE
PH UR STRIP: 7 [PH] (ref 5–9)
PLATELET # BLD: 308 10^3/UL (ref 130–400)
PMV BLD AUTO: 10.3 FL (ref 7.4–10.4)
POTASSIUM SERPL-SCNC: 4 MMOL/L (ref 3.6–5)
PROT SERPL-MCNC: 7.4 G/DL (ref 6.4–8.2)
PROT UR QL STRIP: (no result)
RBC # BLD AUTO: 4.48 10^6/UL (ref 4.35–5.85)
SODIUM SERPL-SCNC: 136 MMOL/L (ref 135–145)
SP GR UR STRIP: 1.01 (ref 1.02–1.02)
SQUAMOUS #/AREA URNS HPF: (no result) /HPF
UROBILINOGEN UR-MCNC: NORMAL MG/DL
WBC # BLD AUTO: 11.8 10^3/UL (ref 4.3–11)
WBC #/AREA URNS HPF: (no result) /HPF

## 2017-05-09 PROCEDURE — 82962 GLUCOSE BLOOD TEST: CPT

## 2017-05-09 PROCEDURE — 83605 ASSAY OF LACTIC ACID: CPT

## 2017-05-09 PROCEDURE — 83735 ASSAY OF MAGNESIUM: CPT

## 2017-05-09 PROCEDURE — 71010: CPT

## 2017-05-09 PROCEDURE — 85025 COMPLETE CBC W/AUTO DIFF WBC: CPT

## 2017-05-09 PROCEDURE — 51702 INSERT TEMP BLADDER CATH: CPT

## 2017-05-09 PROCEDURE — 86141 C-REACTIVE PROTEIN HS: CPT

## 2017-05-09 PROCEDURE — 80053 COMPREHEN METABOLIC PANEL: CPT

## 2017-05-09 PROCEDURE — 36415 COLL VENOUS BLD VENIPUNCTURE: CPT

## 2017-05-09 PROCEDURE — 87040 BLOOD CULTURE FOR BACTERIA: CPT

## 2017-05-09 PROCEDURE — 96360 HYDRATION IV INFUSION INIT: CPT

## 2017-05-09 PROCEDURE — 87088 URINE BACTERIA CULTURE: CPT

## 2017-05-09 PROCEDURE — 81000 URINALYSIS NONAUTO W/SCOPE: CPT

## 2017-05-09 PROCEDURE — 83036 HEMOGLOBIN GLYCOSYLATED A1C: CPT

## 2017-05-09 PROCEDURE — 85027 COMPLETE CBC AUTOMATED: CPT

## 2017-05-09 PROCEDURE — 70450 CT HEAD/BRAIN W/O DYE: CPT

## 2017-05-09 PROCEDURE — 80048 BASIC METABOLIC PNL TOTAL CA: CPT

## 2017-05-09 RX ADMIN — ATORVASTATIN CALCIUM SCH MG: 20 TABLET, FILM COATED ORAL at 21:21

## 2017-05-09 RX ADMIN — PREGABALIN SCH MG: 75 CAPSULE ORAL at 21:21

## 2017-05-09 RX ADMIN — INSULIN ASPART SCH UNIT: 100 INJECTION, SOLUTION INTRAVENOUS; SUBCUTANEOUS at 21:19

## 2017-05-09 RX ADMIN — NYSTATIN SCH GM: 100000 CREAM TOPICAL at 21:21

## 2017-05-09 RX ADMIN — ROPINIROLE SCH MG: 1 TABLET ORAL at 21:21

## 2017-05-09 RX ADMIN — CLINDAMYCIN HYDROCHLORIDE SCH MG: 150 CAPSULE ORAL at 18:26

## 2017-05-09 RX ADMIN — Medication SCH MG: at 21:21

## 2017-05-09 NOTE — OCCUPATIONAL THERAPY EVAL
OT Evaluation-General/PLF


Medical Diagnosis


Admission Date


May 9, 2017 at 15:21


Medical Diagnosis:  AMS, UTI


Onset Date:  May 9, 2017





Therapy Diagnosis


Therapy Diagnosis:  impaired self care skills





Height/Weight


Height (Feet):  5


Height (Inches):  6.00


Weight (Pounds):  200


Weight (Ounces):  4.0





Medical History


Pertinent Medical History:  Arthritis, DM, Heart Failure, HTN, Neuropathy, OA


Additional Medical History


left hip replacement, reduction of left hip dislocation x2, chronic constipation

, anxiety, RLS, chronic back pain,


Reviewed History:  Yes





Social History


Home:  Single Level


Current Living Status:  Alone


Entry Into Home:  Level Entry





ADL-Prior Level of Function


ADL PLOF Comments


Pt provides limited history, requires cues to stay awake and attend to task. Pt 

reports living alone, states sister comes by to assist with cleaning as needed. 

Pt states she is able to complete basic self care. Uses FWW for mobility.


DME/Equipment:  Tall Toilet


Drive Self:  No





OT Current Status


Subjective


Pt lethargic this pm. Pt reports back pain, but does not rate.





Mental Status/Objective


Patient Orientation:  Person





Current


Upper Extremity ROM


Decreased shoulder ROM


Upper Extremity Strength


Unable to complete formal assessment secondary to pt's level of alertness and 

decreased ability to follow commands


Pt moves bilateral UE, but has decreased attention to left side.





ADL-Treatment


ADL-Current


Pt supine to sit with total assist. Pt requests to use BSC. Sit to stand and 

transfer to BSC with total assist. Pt dependent for hygiene. Dependent to 

transfer to EOB. Pt has decreased sitting balance and posture at EOB. Sit to 

supine with assist x2. Unable to further assess ADLs at this time secondary to 

pt's lethargy. Pt going to CT at this time.





Co-treat with PT secondary to impaired tolerance to activity and level of 

alertness. OT focusing on sequencing and upper body management during transfers 

and functional tasks and PT focusing on LE placement and transfer safety.


              Functional [place] Measure


0=Not Assessed/NA   4=Minimal Assistance


1=Total Assistance   5=Supervision or Setup


2=Maximal Assistance   6=Modified [place]


3=Moderate Assistance   7=Complete IndependenceIRFPAI Quality Coding Scale











6 Independent with activity with or without an assistive device


 


5  Patient requires set up or clean up by helper.  Patient completes activity  

by  themselves


 


4 Supervision or touching assist (CGA). [place] provide cues , steadying assist


 


3 The helper provides less than half the effort to complete the activity


 


2 The helper provides more than half the effort to complete the activity


 


1 Dependent.  The helper does all the effort to complete an activity 


 


7 Patient refused to complete or attempt activity


 


9 The patient did not perform the activity before the current illness or injury


 


88 Not attempted due to Medical conditions or safety concerns








Toileting Hygiene (QC):  1


Transfers (B, C, W/C) (FIM):  1


Toilet/Commode Transfer (FIM):  1


Toilet Transfer (QC):  1





Education


OT Patient Education:  Rehab process


Teaching Recipient:  Patient


Teaching Methods:  Discussion


Response to Teaching:  Reinforcement Needed





OT Short Term Goals


Short Term Goals


Time Frame:  May 16, 2017


Grooming(FIM):  5


Upper Body Dressing(FIM):  4


Lower Body Dressing(FIM):  3


Toilet/Commode Transfer(FIM):  3


Additional Short Term Goals:  1-Demonstrate ADL Tasks, 2-Verbalize Understanding

, 3-ImproveStrength/Cha


1=Demonstrate adherence to instructed precautions during ADL tasks.


2=Patient will verbalize/demonstrate understanding of assistive devices/

modifications for ADL.


3=Patient will improve strength/tolerance for activity to enable patient to 

perform ADL's.





OT Long Term Goals


Long Term Goals


Time Frame:  May 30, 2017


Eating (FIM):  6


Eating (QC):  6


Groomin


Bathing(FIM):  5


Shower/Bathe Self (QC):  4


Upper Body Dressing(FIM):  5


Upper Body Dressing (QC):  4


Lower Body Dressing(FIM):  5


Lower Body Dressing (QC):  4


On/Off Footwear (QC):  5


Toileting(FIM):  5


Toileting Hygiene (QC):  4


Toilet/Commode Transfer(FIM):  5


Toilet/Commode Transfer (QC):  4


Shower Transfer(FIM):  5


Additional Goals:  1-Demonstrate ADL Tasks, 2-Verbalize Understanding, 3-

ImproveStrength/Cha


1=Demonstrate adherence to instructed precautions during ADL tasks.


2=Patient will verbalize/demonstrate understanding of assistive devices/

modifications for ADL.


3=Patient will improve strength/tolerance for activity to enable patient to 

perform ADL's.





OT Education/Plan


Problem List/Assessment


Assessment:  Decreased Activ Tolerance, Decreased Safety Aware, Decreased UE 

Strength, Dependent Transfers, Impaired Coordination, Impaired Funct Balance, 

Impaired Self-Care Skills


Pt to benefit from skilled OT intervention for ADL training, transfers, 

strengthening, and safety education to improve level of function and allow safe 

discharge plan.





Discharge Recommendations


Plan/Recommendations:  Continue POC





Treatment Plan/Plan of Care


Treatment,Training & Education:  Yes


Patient would benefit from OT for education, treatment and training to promote 

independence in ADL's, mobility, safety and/or upper extremity function for ADL'

s.


Plan of Care:  ADL Retraining, Functional Mobility, Group Exercise/Act as Ind, 

UE Funct Exercise/Act, UE Neuromus Re-Ed/Coord


Treatment Duration:  May 30, 2017


# of days/week


5-6


Visits Per Week:  10-12


Minutes/Day (M-F):  60-90


Minutes/Day (Sat/[place]):  PRN


Agreement:  Yes


Rehab Potential:  Fair





Time/GCodes


Start Time:  15:10


Stop Time:  15:40


Total Time Billed (hr/min):  15


Billed Treatment Time


1 visit, [facility](15minutes)


Co-treat with PT











FAUSTINO PATE OT May 9, 2017 15:54

## 2017-05-09 NOTE — DIAGNOSTIC IMAGING REPORT
PROCEDURE: CT head without contrast.



TECHNIQUE: Multiple contiguous axial images were obtained through

the brain without the use of intravenous contrast.



INDICATION:  Left-sided weakness. Headache.



FINDINGS:

There is no intracranial hemorrhage. There are periventricular

and deep white matter hypodensities compatible with chronic

microvascular ischemic changes. There is no hydrocephalus. No

extra-axial fluid collection is seen.



The calvarium, the paranasal sinuses and orbits visualized

portions appear unremarkable.



IMPRESSION: No intracranial hemorrhage. White matter findings are

likely secondary to chronic microvascular ischemic changes.

Consider MRI evaluation if symptoms remain unexplained.



Dictated by: 



  Dictated on workstation # [id number]

## 2017-05-09 NOTE — DIAGNOSTIC IMAGING REPORT
Portable upright radiograph of the chest.



INDICATION: Altered mental status.



FINDINGS:

The lungs are clear. The heart size is mildly enlarged. No

effusion or pneumothorax.



Mediastinum and amy appear unremarkable.



IMPRESSION: Mildly prominent cardiac size. Clear lungs.



Dictated by: 



  Dictated on workstation # [id number]

## 2017-05-09 NOTE — ED GENERAL
General


Chief Complaint:  Altered Mental Status


Stated Complaint:  CONFUSION


Nursing Triage Note:  


ARRIVED VIA AMBULANCE TO [facility].  STATES SHE WAS DISCHARGED ON  AND TOLD 


SHE HAD A UTI AND WAS PRESCIRBED A ANTIBIOTIC.  SISTER CALLED HER TODAY AND 


THOUGHT SHE WAS CONFUSED AND CALLED AN AMBULANCE.  PT A/O UPON ARRIVAL TO ER.


Nursing Sepsis Screen:  No Definite Risk


Source of Information:  Patient


Exam Limitations:  No Limitations





History of Present Illness


Time Seen by Provider:  11:30


Initial Comments


Here via ambulance from home where she was reportedly refused today.  She was 

in the hospital for a few days over the weekend for sepsis which apparently was 

related to lower extremity wounds.  She is on antibiotics and has been taking 

them.  EMS reported that the patient's sister called because she is 

increasingly confused and thought she had a urinary tract infection.  She was 

found slumped down in a recliner.  She was unable to get up to unlock the door.

  Patient states that it is because it was a new recliner.  She's been unable 

to answer all questions and follow all commands but is weak.  She does appear 

to have some periods of confusion.  Currently not febrile and does not report 

fever or vomiting.


Timing/Duration:  12 Hours, Getting Worse


Severity:  Moderate


Associated Systoms:  No Cough, No Fever/Chills, No Nausea/Vomiting, No 

Shortness of Air, No Weakness





Allergies and Home Medications


Allergies


Coded Allergies:  


     No Known Drug Allergies (Unverified , 3/11/14)





Home Medications


Acetaminophen 650 Mg Tablet.er, 650-1,200 MG PO Q4H PRN for PAIN, (Reported)


Alprazolam 0.25 Mg Tablet, 0.25 MG PO BID PRN for ANXIETY, (Reported)


Calcium Carbonate 200 Mg Tab.chew, 400 MG PO QID PRN for INDIGESTION, (Reported)


Clindamycin HCl 300 Mg Capsule, 300 MG PO Q6H for 12 Days, #48


   Prescribed by: ALICIA LOO on 17 1226


Duloxetine HCl 60 Mg Capsule.dr, 30 MG PO DAILY, (Reported)


Esomeprazole Magnesium 40 Mg Cap, 40 MG PO DAILY, (Reported)


   TAKES BEFORE BREAKFAST 


Furosemide 40 Mg Tablet, 40 MG PO DAILY, (Reported)


Insulin Aspart 300 Units/3 Ml Solution, SQ QID, (Reported)


    = 0 UNITS 201-250 = 3 UNITS 251-300 = 5 UNITS 301-350 = 7 UNITS 351-

400 = 9 UNITS 


Insulin Glargine,Hum.rec.anlog 100 Unit/1 Ml Insuln.pen, 40 UNITS SQ BID, (

Reported)


   HOLD IF BLOOD SUGAR IS 95 OR BELOW 


Iron Polysaccharide Complex 150 Mg Capsule, 150 MG PO BID, (Reported)


Loperamide HCl 2 Mg Tablet, 2 MG PO UD PRN for DIARRHEA, (Reported)


Loratadine 10 Mg Tablet, 10 MG PO DAILY, (Reported)


Metformin HCl 1,000 Mg Tablet, 1,000 MG PO BID, (Reported)


Metoprolol Succinate 50 Mg Tab.er.24h, 50 MG PO DAILY, (Reported)


Polyethylene Glycol 3350 17 Gm Powd.pack, 17 GM PO DAILY PRN for CONSTIPATION, (

Reported)


Pregabalin 150 Mg Capsule, 150 MG PO TID, (Reported)


Ropinirole HCl 2 Mg Tablet, 2 MG PO BID, (Reported)


Rosuvastatin Calcium 10 Mg Tablet, 10 MG PO DAILY, (Reported)


Solifenacin Succinate 10 Mg Tablet, 10 MG PO DAILY, (Reported)


Tramadol HCl 50 Mg Tablet, 50 MG PO TID, (Reported)





Constitutional:  see HPI, No chills, No fever


EENTM:  no symptoms reported


Respiratory:  no symptoms reported, No short of breath, No wheezing


Cardiovascular:  no symptoms reported


Gastrointestinal:  no symptoms reported, No nausea, No vomiting


Genitourinary:  see HPI


Musculoskeletal:  muscle weakness, other (lower extremity)


Skin:  change in color, lesions (left leg covered with dressing distally)


All Other Systems Reviewed


Negative Unless Noted:  Yes





Past Medical-Social-Family Hx


Patient Social History


Alcohol Use:  Denies Use


Recreational Drug Use:  No


Smoking Status:  Former Smoker


Type Used:  Cigarettes


Former Smoker/When Quit:  1992


Recent Foreign Travel:  No


Contact w/Someone Who Travel:  No


Recent Infectious Disease Expo:  No


Recent Hopitalizations:  Yes (THIS WEEK)





Immunizations Up To Date


Tetanus Booster (TDap):  Less than 5yrs


PED Vaccines UTD:  Yes


Date of Pneumonia Vaccine:  Sep 12, 2012


Date of Influenza Vaccine:  Sep 18, 2016





Seasonal Allergies


Seasonal Allergies:  No





Surgeries


HX Surgeries:  Yes (HEMORRHOIDECTOMY, L HIP replacement, reduction left hip 

dislocation x2)


Surgeries:  Gallbladder, Joint Replacement, Orthopedic





Respiratory


Hx Respiratory Disorders:  No





Cardiovascular


Hx Cardiac Disorders:  Yes (CHF)


Cardiac Disorders:  Hypertension





Neurological


Hx Neurological Disorders:  Yes (RLS)


Neurological Disorders:  Neuropathy





Reproductive System


Hx Reproductive Disorders:  No


Sexually Transmitted Disease:  No


HIV/AIDS:  No





Genitourinary


Hx Genitourinary Disorders:  No





Gastrointestinal


Hx Gastrointestinal Disorders:  Yes


Gastrointestinal Disorders:  Chronic Constipation, Hemorrhoids





Musculoskeletal


Hx Musculoskeletal Disorders:  Yes (school age /  accident)


Musculoskeletal Disorders:  Arthritis, Back Injury, Chronic Back Pain





Endocrine


Hx Endocrine Disorders:  Yes (Lantus/ Novolog)


Endocrine Disorders:  Diabetes, Insulin dep





HEENT


HX ENT Disorders:  Yes (report the start of cataracts, migrains)


HEENT Disorders:  Cataract


Loss of Vision:  Bilateral


Hearing Impairment:  Denies





Cancer


Hx Cancer:  No





Psychosocial


Hx Psychiatric Problems:  Yes


Behavioral Health Disorders:  Anxiety, Depression





Integumentary


HX Skin/Integumentary Disorder:  Yes (SEEING WOUND CARE FOR WOUND ON FEET, 

history of cellulitis)





Blood Transfusions


Hx Blood Disorders:  No


Adverse Reaction to a Blood Tr:  No





Reviewed Nursing Assessment


Reviewed/Agree w Nursing PMH:  Yes





Family Medical History


Significant Family History:  No Pertinent Family Hx


Family Medial History:  


Congestive heart failure


  03 FATHER


Prostate cancer


  03 FATHER





Physical Exam


Vital Signs





Vital Sign - Last 12Hours








 17





 11:10


 


Temp 98.4


 


Pulse 77


 


Resp 16


 


B/P (MAP) 137/72


 


Pulse Ox 98





Capillary Refill : Less Than 3 Seconds


General Appearance:  No Apparent Distress, WD/WN


HEENT:  PERRL/EOMI, Pharynx Normal


Neck:  Non Tender, Supple


Respiratory:  Lungs Clear, Normal Breath Sounds


Cardiovascular:  Regular Rate, Rhythm, No Murmur


Gastrointestinal:  Non Tender, Soft


Back:  Normal Inspection, No CVA Tenderness, No Vertebral Tenderness


Extremity:  Non Tender, Pedal Edema (1+ to the mid tibia bilaterally)


Neurologic/Psychiatric:  Alert, Oriented x3, No Motor/Sensory Deficits


Skin:  Warm/Dry, Erythema (erythema bilateral lower extremities to the level of 

mid tibia that is mild.), Other (small healing wound to the dorsum of the left 

midfoot.  Small healing wound to the area of the MTP on the sole of the foot on 

the right.)





Focused Exam


Lactic Acid Level





Laboratory Tests








Test


  17


11:42


 


Lactic Acid Level


  1.67 MMOL/L


(0.50-2.00)











Progress/Results/Core Measures


Results/Orders


Lab Results





Laboratory Tests








Test


  17


11:14 17


11:35 17


11:42 Range/Units


 


 


Glucometer 157 H     MG/DL


 


Urine Color  YELLOW    


 


Urine Clarity  CLEAR    


 


Urine pH  7   5-9  


 


Urine Specific Gravity  1.010 L  1.016-1.022  


 


Urine Protein  3+ H  NEGATIVE  


 


Urine Glucose (UA)  NEGATIVE   NEGATIVE  


 


Urine Ketones  1+ H  NEGATIVE  


 


Urine Nitrite  NEGATIVE   NEGATIVE  


 


Urine Bilirubin  NEGATIVE   NEGATIVE  


 


Urine Urobilinogen  NORMAL   NORMAL  MG/DL


 


Urine Leukocyte Esterase  NEGATIVE   NEGATIVE  


 


Urine RBC (Auto)  NEGATIVE   NEGATIVE  


 


Urine RBC  RARE    /HPF


 


Urine WBC  NONE    /HPF


 


Urine Squamous Epithelial


Cells 


  RARE 


  


   /HPF


 


 


Urine Crystals  NONE    /LPF


 


Urine Bacteria  NEGATIVE    /HPF


 


Urine Casts  NONE    /LPF


 


Urine Mucus  NEGATIVE    /LPF


 


Urine Culture Indicated  NO    


 


White Blood Count


  


  


  11.8 H


  4.3-11.0


10^3/uL


 


Red Blood Count


  


  


  4.48 


  4.35-5.85


10^6/uL


 


Hemoglobin   11.8  11.5-16.0  G/DL


 


Hematocrit   36  35-52  %


 


Mean Corpuscular Volume   81  80-99  FL


 


Mean Corpuscular Hemoglobin   26  25-34  PG


 


Mean Corpuscular Hemoglobin


Concent 


  


  33 


  32-36  G/DL


 


 


Red Cell Distribution Width   14.5  10.0-14.5  %


 


Platelet Count


  


  


  308 


  130-400


10^3/uL


 


Mean Platelet Volume   10.3  7.4-10.4  FL


 


Neutrophils (%) (Auto)   77 H 42-75  %


 


Lymphocytes (%) (Auto)   10 L 12-44  %


 


Monocytes (%) (Auto)   10  0-12  %


 


Eosinophils (%) (Auto)   4  0-10  %


 


Basophils (%) (Auto)   1  0-10  %


 


Neutrophils # (Auto)   9.1 H 1.8-7.8  X 10^3


 


Lymphocytes # (Auto)   1.1  1.0-4.0  X 10^3


 


Monocytes # (Auto)   1.1 H 0.0-1.0  X 10^3


 


Eosinophils # (Auto)


  


  


  0.4 H


  0.0-0.3


10^3/uL


 


Basophils # (Auto)


  


  


  0.1 


  0.0-0.1


10^3/uL


 


Sodium Level   136  135-145  MMOL/L


 


Potassium Level   4.0  3.6-5.0  MMOL/L


 


Chloride Level   98    MMOL/L


 


Carbon Dioxide Level   27  21-32  MMOL/L


 


Anion Gap   11  5-14  MMOL/L


 


Blood Urea Nitrogen   16  7-18  MG/DL


 


Creatinine


  


  


  0.86 


  0.60-1.30


MG/DL


 


Estimat Glomerular Filtration


Rate 


  


  > 60 


   


 


 


BUN/Creatinine Ratio   19   


 


Glucose Level   162 H   MG/DL


 


Lactic Acid Level


  


  


  1.67 


  0.50-2.00


MMOL/L


 


Calcium Level   10.4 H 8.5-10.1  MG/DL


 


Magnesium Level   1.5 L 1.8-2.4  MG/DL


 


Total Bilirubin   0.3  0.1-1.0  MG/DL


 


Aspartate Amino Transf


(AST/SGOT) 


  


  19 


  5-34  U/L


 


 


Alanine Aminotransferase


(ALT/SGPT) 


  


  21 


  0-55  U/L


 


 


Alkaline Phosphatase   98    U/L


 


C-Reactive Protein High


Sensitivity 


  


  1.50 H


  0.00-0.50


MG/DL


 


Total Protein   7.4  6.4-8.2  G/DL


 


Albumin   3.9  3.2-4.5  G/DL








My Orders





Orders - ENOCH VARELA MD


Cbc With Automated Diff (17 11:24)


Comprehensive Metabolic Panel (17 11:24)


Hs C Reactive Protein (17 11:24)


Magnesium (17 11:24)


Ua Culture If Indicated (17 11:24)


Saline Lock/Iv-Start (17 11:24)


Ns Iv 1000 Ml (Sodium Chloride 0.9%) (17 11:24)


Lactic Acid Analyzer (17 11:26)


Blood Culture (17 11:48)


Chest 1 View, Ap/Pa Only (17 12:07)





Medications Given in ED





Current Medications








 Medications  Dose


 Ordered  Sig/Riaz


 Route  Start Time


 Stop Time Status Last Admin


Dose Admin


 


 Sodium Chloride  1,000 ml @ 


 0 mls/hr  Q0M ONCE


 IV  17 11:24


 17 11:26 DC 17 11:57


1,000 MLS/HR








Vital Signs/I&O





Vital Sign - Last 12Hours








 17





 11:10


 


Temp 98.4


 


Pulse 77


 


Resp 16


 


B/P (MAP) 137/72


 


Pulse Ox 98














Blood Pressure Mean:  93











Point of Care Testing


Finger Stick Blood Glucose:  157


Progress Note :  


Progress Note


Seen and evaluated.  IV, labs, UA, blood cultures and lactic acid ordered.  

Normal saline 1 L bolus.  1210: Patient noted to be hypoxic with O2 sat at 86 

percent and declining while resting but not sleeping.  O2 applied at 2 L via 

nasal cannula which did improve her O2 sat.  Monitor patient.  1245: Labs, x-

ray and UA results noted.  We have asked inpatient rehabilitation team to 

evaluate the patient due to patient's decline in function as well as need for 

oxygen.  She has become progressively weakened and is having increased 

debility.  Monitor patient.  1315: Inpatient.  Team monitoring and patient will 

likely meet criteria.  I did discuss with the patient the need for therapy and 

strengthening and patient states that she will try to do well.  Dr. Dacosta 

paged.  1346: Discussed case with Dr. Dacosta he agrees and accepts patient 

for admission to inpatient rehabilitation.





Diagnostic Imaging





   Diagonstic Imaging:  Xray


   Plain Films/CT/US/NM/MRI:  chest


Comments


NAME:      HOMERO VERONICA


MED REC#:   [id number]


ACCOUNT#:   [id number]


PT STATUS:   REG ER


:      1950


PHYSICIAN:    ENOCH VARELA MD


ADMIT DATE:   17/ER


 ***Signed***


Date of Exam:   17





CHEST 1 VIEW, AP/PA ONLY


 





Portable upright radiograph of the chest.





INDICATION: Altered mental status.





FINDINGS:


The lungs are clear. The heart size is mildly enlarged. No


effusion or pneumothorax.





Mediastinum and amy appear unremarkable.





IMPRESSION: Mildly prominent cardiac size. Clear lungs.





Dictated by: 





  Dictated on workstation # [id number]





[id number]





Dict:      17 1241


Trans:      17 1311





Interpreted by:         MARINO BOURGEOIS MD


Electronically signed by:   MARINO BOURGEOIS MD 17 1311





Departure


Communication


Time/Spoke to Admitting Phy:  13:46





Impression


Impression:  


 Primary Impression:  


 CHRONIC GENERALIZED WEAKNESS


 Additional Impressions:  


 Debility


 Hypoxia


Disposition:   ADMITTED AS INPATIENT


Condition:  Critical


Decision to Admit Reason:  Admit from ER (General)


Decision to Admit/Date:  May 9, 2017


Time/Decision to Admit Time:  13:15





Departure-Patient Inst.


Referrals:  


LELAND DACOSTA DO (PCP/Family)


Primary Care Physician











ENOCH VARELA MD May 9, 2017 11:48

## 2017-05-09 NOTE — PHYSICAL THERAPY EVALUATION
PT Evaluation-General


Medical Diagnosis


Admission Date


May 9, 2017 at 15:21


Medical Diagnosis:  AMS/UTI


Onset Date:  May 9, 2017





Therapy Diagnosis


Therapy Diagnosis:  generalized weakness/debility





Height/Weight


Height (Feet):  5


Height (Inches):  6.00


Weight (Pounds):  200


Weight (Ounces):  4.0





Precautions


Precautions/Isolations:  Fall Prevention





Referral


Physician:  Osmany


Reason for Referral:  Evaluation/Treatment





Medical History


Pertinent Medical History:  Arthritis, DM, Heart Failure, HTN, Neuropathy, OA


Additional Medical History


recent hospital stay for UTI -2017 and dismissed to home


Current History


family found patient slumped in recliner confused and lethargic. EMS was 

called.  In ER, patient continued with confusion, hypoxia, weakness and was 

admitted to ARU.


Reviewed History:  Yes





Social History


Home:  Single Level


Current Living Status:  Alone


Entry Into Home:  Level Entry





Prior/Core FIM


Prior Level of Function


              Functional [place] Measure


0=Not Assessed/NA   4=Minimal Assistance


1=Total Assistance   5=Supervision or Setup


2=Maximal Assistance   6=Modified [place]


3=Moderate Assistance   7=Complete [place]


Bed Mobility:  6


Transfers (B,C,W/C) (FIM):  6


Gait:  6


ambulates with FWW or 4WW





PT Evaluation-Current


Subjective


Patient is severely lethargic and unable to follow simple direction.





Pain





   Numeric Pain Scale:  0-No Pain


   Location:  No Pain Reported





Objective


Patient Orientation:  Person, Place, Time, [place], Listless


Problem Solving:  Poor


Attachments:  Woods Catheter





ROM/Strength


ROM Lower Extremities


bilateral LE WFL; noted left foot deformity due to arthritis


Strenght Lower Extremities


bilaterally 2/5 grossly (unable to formally test due to patient's inability to 

follow direction to perform lower extremity MMT)





Integumentary/Posture


Integumentary


refer to nursing notes (diabetic foot ulcers bilaterally)


Bladder Incontinence:  Woods Cath


Posture


kyphotic





Neuromuscular


(Tone, Coordination, Reflexes)


severely diminished tone and coordination with all movement bilateral LE's and 

trunk.  Noted left sided delayed response time vs. right with head turning and 

left UE/LE use





Sensory


Vision:  Functional


Hearing:  Functional


Sensation Right Lower Extremit:  Impaired


Sensation Left Lower Extremity:  Impaired





Transfers


              Functional [place] Measure


0=Not Assessed/NA   4=Minimal Assistance


1=Total Assistance   5=Supervision or Setup


2=Maximal Assistance   6=Modified [place]


3=Moderate Assistance   7=Complete [place] Quality Coding Scale











6 Independent with activity with or without an assistive device


 


5  Patient requires set up or clean up by helper.  Patient completes activity  

by  themselves


 


4 Supervision or touching assist (CGA). [place] provide cues , steadying assist


 


3 The helper provides less than half the effort to complete the activity


 


2 The helper provides more than half the effort to complete the activity


 


1 Dependent.  The helper does all the effort to complete an activity 


 


7 Patient refused to complete or attempt activity


 


9 The patient did not perform the activity before the current illness or injury


 


88 Not attempted due to Medical conditions or safety concerns








Transfers (B, C, W/C) (FIM):  1


Scootin


Rollin


Roll Left to Right (QC):  1


Supine to/from Sit:  1


Sit to/from Stand:  1


Sit to Lying (QC):  1


Lying to Sitting/Side of Bed(Q:  1


Sit to Stand (QC):  1


Chair/Bed-to-Chair Xfer(QC):  1


Car Transfer (QC):  88


OT address sequencing with transfer training and bed mobility, while PT address 

bilateral LE mobility and core strength with transfers and bed mobility.





Gait


Does the Patient Walk?:  No and Walking Goal IS indicated


Mode of Locomotion:  Both


Anticipated Mode of Locomotion:  Both


Walk 10 feet (QC):  88


Walk 50 ft with 2 Turns(QC):  88


Walk 150 ft (QC):  88


Walking 10ft/uneven surface-QC:  88





Stairs


1 Step (curb) (QC):  88


4 Steps (QC):  88


12 Steps (QC):  88


If not tested on admit;explain


Patient is unable to maintain sitting EOB and is too lethargic/weak to safely 

attempt.





Balance


Sitting Static:  Poor


Sitting Dynamic:  Poor


Standing Static:  Poor


 Standing Dynamic:  Poor


Picking up an Object (QC):  88





Assessment/Needs


67 y.o. female, currently presenting with severe lethargy and weakness, will 

benefit from skilled PT to address functional strength and mobility to improve 

current LOF.  Patient is limited with following direction, strength, simple 

tasks, mobility and overall gross motor skills.


Rehab Potential:  Fair


Post Rehab Potential-Barriers:  current condition





PT Short Term Goals


Short Term Goals


Time Frame:  May 23, 2017


Transfers (B,C,W/C) (FIM):  3


Gait (FIM):  1


Distance (FIM):  1=up to 49 ft


Gait Distance Comment:  20'


Gait Level of Assist:  3


Gait Assistive Device:  FWW


Stairs (FIM):  1


# of Steps:  1


Stairs Level of Assist:  3





PT Long Term Goals


Long Term Goals


PT Long Term Goals Time Frame:  2017


Transfers (B,C,W/C) (FIM):  5


Sit to Lying (QC):  5


Lying-Sitting on Side/Bed(QC):  5


Sit to Stand (QC):  5


Rollin


Roll Left to Right (QC):  5


Chair/Bed-to-Chair Xfer(QC):  5


Car Transfer (QC):  5


Does the Patient Walk:  No and Walking Goal IS indicated


Gait (FIM):  2


Gait distance (FIM):  3=796-92 ft


Distance:  75'


Walk 10 feet (QC):  5


Walk 10ft-Uneven Surface(QC):  5


Walk 50ft with 2 Turns (QC):  5


Walk 150 ft (QC):  88


Gait Level of Assist:  5


Gait Assistive Device:  FWW


# of Steps:  4


1 Step (curb) (QC):  4


4 Steps (QC):  4


12 Steps (QC):  88


Stairs Level Of Assist:  5


Picking up an Object (QC):  4





PT Plan


Problem List


Problem List:  Activity Tolerance, Functional Strength, Safety, Balance, Gait, 

Transfer, Bed Mobility





Treatment/Plan


Treatment Plan:  Continue Plan of Care


Treatment Plan:  Bed Mobility, Education, Functional Activity Cha, Group 

Therapy, Gait, Safety, Therapeutic Exercise, Transfers


Treatment Duration:  2017


# of days/week


6


Visits Per Week:  6-11


Minutes/Day (M-F):  60-90


Minutes/Day (Sat/[place]):  PRN


Pt/Family Agrees w/Plan:  Yes





Safety Risks/Education


Patient Education:  Safety Issues


Teaching Recipient:  Patient


Teaching Methods:  Discussion


Response to Teaching:  Reinforcement Needed





Discharge Recommendations


Therapy D/C Recommendations:  Nursing Home Placement, Skilled Nursing (TCU/NH)





Time/GCodes


Time In:  1510


Time Out:  1540


Total Billed Treatment Time:  30


Total Billed Treatment


1 visit


EVHighC 15 min (15 min CoTreat)











DAR SCHMIDT PT May 9, 2017 15:51

## 2017-05-10 VITALS — DIASTOLIC BLOOD PRESSURE: 67 MMHG | SYSTOLIC BLOOD PRESSURE: 120 MMHG

## 2017-05-10 VITALS — DIASTOLIC BLOOD PRESSURE: 64 MMHG | SYSTOLIC BLOOD PRESSURE: 110 MMHG

## 2017-05-10 LAB
ANION GAP SERPL CALC-SCNC: 9 MMOL/L (ref 5–14)
BILIRUB UR QL STRIP: (no result)
BUN SERPL-MCNC: 22 MG/DL (ref 7–18)
BUN/CREAT SERPL: 23
CALCIUM SERPL-MCNC: 9.2 MG/DL (ref 8.5–10.1)
CHLORIDE SERPL-SCNC: 103 MMOL/L (ref 98–107)
CO2 SERPL-SCNC: 25 MMOL/L (ref 21–32)
CREAT SERPL-MCNC: 0.97 MG/DL (ref 0.6–1.3)
GFR SERPLBLD BASED ON 1.73 SQ M-ARVRAT: 57 ML/MIN
GLUCOSE SERPL-MCNC: 114 MG/DL (ref 70–105)
KETONES UR QL STRIP: NEGATIVE
LEUKOCYTE ESTERASE UR QL STRIP: (no result)
NITRITE UR QL STRIP: NEGATIVE
PH UR STRIP: 6 [PH] (ref 5–9)
POTASSIUM SERPL-SCNC: 4.2 MMOL/L (ref 3.6–5)
PROT UR QL STRIP: (no result)
SODIUM SERPL-SCNC: 137 MMOL/L (ref 135–145)
SP GR UR STRIP: 1.01 (ref 1.02–1.02)
UROBILINOGEN UR-MCNC: NORMAL MG/DL
WBC #/AREA URNS HPF: (no result) /HPF

## 2017-05-10 RX ADMIN — METOPROLOL SUCCINATE SCH MG: 50 TABLET, EXTENDED RELEASE ORAL at 09:11

## 2017-05-10 RX ADMIN — ATORVASTATIN CALCIUM SCH MG: 20 TABLET, FILM COATED ORAL at 21:14

## 2017-05-10 RX ADMIN — TROSPIUM CHLORIDE SCH MG: 20 TABLET, FILM COATED ORAL at 06:32

## 2017-05-10 RX ADMIN — CLINDAMYCIN HYDROCHLORIDE SCH MG: 150 CAPSULE ORAL at 06:28

## 2017-05-10 RX ADMIN — NYSTATIN SCH GM: 100000 CREAM TOPICAL at 21:15

## 2017-05-10 RX ADMIN — INSULIN ASPART SCH UNIT: 100 INJECTION, SOLUTION INTRAVENOUS; SUBCUTANEOUS at 06:32

## 2017-05-10 RX ADMIN — ROPINIROLE SCH MG: 1 TABLET ORAL at 21:14

## 2017-05-10 RX ADMIN — FUROSEMIDE SCH MG: 40 TABLET ORAL at 09:11

## 2017-05-10 RX ADMIN — PANTOPRAZOLE SODIUM SCH MG: 40 TABLET, DELAYED RELEASE ORAL at 06:28

## 2017-05-10 RX ADMIN — Medication SCH MG: at 21:14

## 2017-05-10 RX ADMIN — METFORMIN HYDROCHLORIDE SCH MG: 500 TABLET, FILM COATED ORAL at 17:08

## 2017-05-10 RX ADMIN — INSULIN ASPART SCH UNIT: 100 INJECTION, SOLUTION INTRAVENOUS; SUBCUTANEOUS at 17:07

## 2017-05-10 RX ADMIN — PREGABALIN SCH MG: 75 CAPSULE ORAL at 21:14

## 2017-05-10 RX ADMIN — CLINDAMYCIN HYDROCHLORIDE SCH MG: 150 CAPSULE ORAL at 00:08

## 2017-05-10 RX ADMIN — NYSTATIN SCH GM: 100000 CREAM TOPICAL at 09:12

## 2017-05-10 RX ADMIN — PREGABALIN SCH MG: 75 CAPSULE ORAL at 09:11

## 2017-05-10 RX ADMIN — CLINDAMYCIN HYDROCHLORIDE SCH MG: 150 CAPSULE ORAL at 12:11

## 2017-05-10 RX ADMIN — PREGABALIN SCH MG: 75 CAPSULE ORAL at 12:11

## 2017-05-10 RX ADMIN — INSULIN ASPART SCH UNIT: 100 INJECTION, SOLUTION INTRAVENOUS; SUBCUTANEOUS at 21:14

## 2017-05-10 RX ADMIN — Medication SCH MG: at 09:11

## 2017-05-10 RX ADMIN — CLINDAMYCIN HYDROCHLORIDE SCH MG: 150 CAPSULE ORAL at 17:09

## 2017-05-10 RX ADMIN — LORATADINE SCH MG: 10 TABLET ORAL at 09:11

## 2017-05-10 RX ADMIN — ENOXAPARIN SODIUM SCH MG: 100 INJECTION SUBCUTANEOUS at 09:19

## 2017-05-10 RX ADMIN — TROSPIUM CHLORIDE SCH MG: 20 TABLET, FILM COATED ORAL at 17:08

## 2017-05-10 RX ADMIN — NYSTATIN SCH GM: 100000 CREAM TOPICAL at 12:11

## 2017-05-10 RX ADMIN — DULOXETINE HYDROCHLORIDE SCH MG: 30 CAPSULE, DELAYED RELEASE ORAL at 09:11

## 2017-05-10 RX ADMIN — ROPINIROLE SCH MG: 1 TABLET ORAL at 09:11

## 2017-05-10 RX ADMIN — INSULIN ASPART SCH UNIT: 100 INJECTION, SOLUTION INTRAVENOUS; SUBCUTANEOUS at 11:02

## 2017-05-10 NOTE — HISTORY & PHYSICIAL
History of Present Illness


History of Present Illness


Reason for visit/HPI


patient came by ambulance  to the emergency room.


According to the sister patient unable to take care of herself.


Patient was in hospital last weekend and wants to go home to take care  


Patient has cellulitis of the leg.


In the emergency room took 3 people to get her up


Date of Admission


May 9, 2017 at 15:21


I consulted on this patient on


5/10/17


 08:21


Attending Physician


Anant Prasad MD


Admitting Physician


Edvin Sullivan DO


Consult








Allergies and Home Medications


Allergies


Coded Allergies:  


     No Known Drug Allergies (Unverified , 3/11/14)





Home Medications


Acetaminophen 650 Mg Tablet.er, 650-1,200 MG PO Q4H PRN for PAIN, (Reported)


Alprazolam 0.25 Mg Tablet, 0.25 MG PO BID PRN for ANXIETY, (Reported)


Calcium Carbonate 200 Mg Tab.chew, 400 MG PO QID PRN for INDIGESTION, (Reported)


Clindamycin HCl 300 Mg Capsule, 300 MG PO Q6H, (Reported)


   FILLED 17 #48 FOR A 12 DAY THERAPY 


Duloxetine HCl 30 Mg Capsule.dr, 30 MG PO DAILY, (Reported)


Esomeprazole Magnesium 40 Mg Cap, 40 MG PO DAILY, (Reported)


   TAKES BEFORE BREAKFAST 


Furosemide 40 Mg Tablet, 40 MG PO DAILY, (Reported)


Insulin Aspart 300 Units/3 Ml Solution, SQ QID, (Reported)


    = 0 UNITS 201-250 = 3 UNITS 251-300 = 5 UNITS 301-350 = 7 UNITS 351-

400 = 9 UNITS 


Insulin Glargine,Hum.rec.anlog 100 Unit/1 Ml Insuln.pen, 40 UNITS SQ BID, (

Reported)


   HOLD IF BLOOD SUGAR IS 95 OR BELOW 


Iron Polysaccharide Complex 150 Mg Capsule, 150 MG PO BID, (Reported)


Loratadine 10 Mg Tablet, 10 MG PO DAILY, (Reported)


Metformin HCl 1,000 Mg Tablet, 1,000 MG PO BID, (Reported)


Metoprolol Succinate 50 Mg Tab.er.24h, 50 MG PO DAILY, (Reported)


Polyethylene Glycol 3350 17 Gm Powd.pack, 17 GM PO DAILY PRN for CONSTIPATION, (

Reported)


Pregabalin 150 Mg Capsule, 150 MG PO TID, (Reported)


Ropinirole HCl 2 Mg Tablet, 2 MG PO BID, (Reported)


Rosuvastatin Calcium 10 Mg Tablet, 10 MG PO DAILY, (Reported)


   LAST FILLED 17 #30  


Solifenacin Succinate 10 Mg Tablet, 10 MG PO DAILY, (Reported)


Tramadol HCl 50 Mg Tablet, 50 MG PO Q8H, (Reported)





Past Medical-Social-Family Hx


Patient Social History


Marrital Status:  


Employed/Student:  unemployed


Smoking Status:  Former Smoker


Former smoker/When Quit:  1992


Recent Foreign Travel:  No


Contact w/other who traveled:  No


Recent Hopitalizations:  Yes (This week... DC 17 from fourth)


Recent Infectious Disease Expo:  No





Immunizations Up To Date


Tetanus Booster (TDap):  Less than 5yrs


Date of Pneumonia Vaccine:  Sep 12, 2012


Date of Influenza Vaccine:  Sep 18, 2016





Seasonal Allergies


Seasonal Allergies:  No





Surgeries


HX Surgeries:  Yes (HEMORRHOIDECTOMY, L HIP replacement, reduction left hip 

dislocation x2)


Surgeries:  Gallbladder, Joint Replacement, Orthopedic





Respiratory


Hx Respiratory Disorders:  No





Cardiovascular


Hx Cardiovascular Disorders:  Yes (CHF)


Cardiac Disorders:  Hypertension





Neurological


Hx Neurological Disorders:  Yes (RLS)


Neurological Disorders:  Neuropathy





Reproductive System


Hx Reproductive Disorders:  No


Sexually Transmitted Disease:  No


HIV/AIDS:  No





Genitourinary


Hx Genitourinary Disorders:  No





Gastrointestinal


Hx Gastrointestinal Disorders:  Yes


Gastrointestinal Disorders:  Hemorrhoids





Musculoskeletal


Hx Musculoskeletal Disorders:  Yes (school age /  accident)


Musculoskeletal Disorders:  Arthritis, Chronic Back Pain





Endocrine


Hx Endocrine Disorders:  Yes (Lantus/ Novolog)


Endocrine Disorders:  Diabetes, Insulin dep





HEENT


HX ENT Disorders:  Yes (report the start of cataracts, migrains)


HEENT Disorders:  Cataract


Loss of Vision:  Bilateral


Hearing Impairment:  Denies





Cancer


Hx Cancer:  No





Psychosocial


Hx Psychiatric Problems:  Yes


Behavioral Health Disorders:  Anxiety, Depression





Integumentary


HX Skin/Integumentary Disorder:  Yes (SEEING WOUND CARE FOR WOUND ON FEET, 

history of cellulitis)





Blood Transfusions


Hx Blood Disorders:  No


Adverse Reaction to a Blood Tr:  No





Family Medical History


Significant Family History:  No Pertinent Family Hx


Family Hx:  


Congestive heart failure


  03 FATHER


Prostate cancer


  03 FATHER





No Family History of:


  Abdominal aortic aneurysm


  Harvey's disease


  Alcoholism


  Aphasia


  Cancer


  Cancer of colon


  Cataract


  Congenital heart disease


  Cystic fibrosis


  Dementia


  Dysphagia


  Family history: Allergy


  Family history: Alzheimer's disease


  Family history: Arthritis


  Family history: Asthma


  Family history: Breast disease


  Family history: Cardiovascular disease


  Family history: Coronary thrombosis


  Family history: Diabetes mellitus


  Family history: Gastrointestinal disease


  Family history: Glaucoma


  Family history: Hypertension


  Family history: Osteoporosis


  Family history: Thyroid disorder


  Headache


  Hearing loss


  Heart disease


  Hereditary disease


  History of - anemia


  History of - disorder


  History of - respiratory disease


  History of drug abuse


  Human immunodeficiency virus (HIV) seropositivity


  Hypercholesterolemia


  Infertile


  Kidney disease


  Malignant neoplasm of lung


  Myocardial infarction


  Parkinson's disease


  Psychotic disorder


  Seizure disorder


  Stroke


  Tuberculosis


  Visual impairment





Constitutional:  malaise, weakness


EENTM:  no symptoms reported


Respiratory:  no symptoms reported


Cardiovascular:  no symptoms reported


Gastrointestinal:  no symptoms reported


Genitourinary:  other (treated for urinary tract infectio)





Physical Exam


Vital Signs





Vital Sign - Last 12Hours








 17





 15:47


 


Temp 96.4


 


Pulse 78


 


Resp 18


 


B/P (MAP) 144/95


 


Pulse Ox 98


 


O2 Delivery Room Air





Capillary Refill : Less Than 3 Seconds


General Appearance:  No Apparent Distress, WD/WN


Eyes:  Bilateral Eye Normal Inspection


HEENT:  Normal ENT Inspection


Neck:  Full Range of Motion, Normal Inspection


Respiratory:  Chest Non Tender, Lungs Clear, Normal Breath Sounds, No Accessory 

Muscle Use, No Respiratory Distress


Cardiovascular:  Regular Rate, Rhythm, No Murmur


Gastrointestinal:  Non Tender, Soft





Assessment/Plan


Assessment and Plan


debility.


Cellulitis of legs.


Diabetes.


Inability to get around


Problems:  





Clinical Quality Measures


DVT/VTE Risk/Contraindication:


Risk Factor Score Per Nursin


RFS Level Per Nursing on Admit:  4+=Very High











EDVIN SULLIVAN DO May 10, 2017 08:25

## 2017-05-10 NOTE — OCCUPATIONAL THER DAILY NOTE
OT Current Status-Daily Note


Subjective


Pt sitting in chair, agrees to treatment.





Mental Status/Objective


              Functional [place] Measure


0=Not Assessed/NA   4=Minimal Assistance


1=Total Assistance   5=Supervision or Setup


2=Maximal Assistance   6=Modified [place]


3=Moderate Assistance   7=Complete [place]





ADL-Treatment


              Functional [place] Measure


0=Not Assessed/NA   4=Minimal Assistance


1=Total Assistance   5=Supervision or Setup


2=Maximal Assistance   6=Modified [place]


3=Moderate Assistance   7=Complete IndependenceIRFPAI Quality Coding Scale











6 Independent with activity with or without an assistive device


 


5  Patient requires set up or clean up by helper.  Patient completes activity  

by  themselves


 


4 Supervision or touching assist (CGA). [place] provide cues , steadying assist


 


3 The helper provides less than half the effort to complete the activity


 


2 The helper provides more than half the effort to complete the activity


 


1 Dependent.  The helper does all the effort to complete an activity 


 


7 Patient refused to complete or attempt activity


 


9 The patient did not perform the activity before the current illness or injury


 


88 Not attempted due to Medical conditions or safety concerns











Other Treatment


Pt performed sit to stand x5 trials to increase strength needed for transfers. 

Pt required minimal to moderate assistance for sit to stand. Skilled cues 

required for proper hand placement and safety. Rest breaks between trials. Pt 

performed bilateral UE exercises to increase strength and activity tolerance 

needed for ADLs and transfers. Pt performed shoulder flexion, forward press, 

and biceps curls x15 reps with dowel suman. Rest breaks between exercises. Pt 

sitting in chair with needs met after session.





OT Short Term Goals


Short Term Goals


Time Frame:  May 16, 2017


Grooming(FIM):  5


Upper Body Dressing(FIM):  4


Lower Body Dressing(FIM):  3


Transfers (B,C,W/C) (FIM):  3


Toilet/Commode Transfer(FIM):  3


Additional Short Term Goals:  1-Demonstrate ADL Tasks, 2-Verbalize Understanding

, 3-ImproveStrength/Cha


1=Demonstrate adherence to instructed precautions during ADL tasks.


2=Patient will verbalize/demonstrate understanding of assistive devices/

modifications for ADL.


3=Patient will improve strength/tolerance for activity to enable patient to 

perform ADL's.





OT Long Term Goals


Long Term Goals


Time Frame:  May 30, 2017


Eating (FIM):  6


Eating (QC):  6


Groomin


Bathing(FIM):  5


Shower/Bathe Self (QC):  4


Upper Body Dressing(FIM):  5


Upper Body Dressing (QC):  4


Lower Body Dressing(FIM):  5


Lower Body Dressing (QC):  4


On/Off Footwear (QC):  5


Toileting(FIM):  5


Toileting Hygiene (QC):  4


Toilet/Commode Transfer(FIM):  5


Toilet/Commode Transfer (QC):  4


Shower Transfer(FIM):  5


Additional Goals:  1-Demonstrate ADL Tasks, 2-Verbalize Understanding, 3-

ImproveStrength/Cha


1=Demonstrate adherence to instructed precautions during ADL tasks.


2=Patient will verbalize/demonstrate understanding of assistive devices/

modifications for ADL.


3=Patient will improve strength/tolerance for activity to enable patient to 

perform ADL's.





OT Education/Plan


Problem List/Assessment


Pt to benefit from skilled OT intervention for ADL training, transfers, 

strengthening, and safety education to improve level of function and allow safe 

discharge plan.





Discharge Recommendations


Plan/Recommendations:  Continue POC





Treatment Plan/Plan of Care


Patient would benefit from OT for education, treatment and training to promote 

independence in ADL's, mobility, safety and/or upper extremity function for ADL'

s.


Plan of Care:  ADL Retraining, Functional Mobility, Group Exercise/Act as Ind, 

UE Funct Exercise/Act, UE Neuromus Re-Ed/Coord


Treatment Duration:  May 30, 2017


Visits Per Week:  10-12


Minutes/Day (M-F):  60-90


Minutes/Day (Sat/[place]):  PRN


Agreement:  Yes


Rehab Potential:  Fair





Time/GCodes


Start Time:  13:30


Stop Time:  14:00


Total Time Billed (hr/min):  30


Billed Treatment Time


1 visit, FA(15minutes), EX(15minutes)











FAUSTINO PATE OT May 10, 2017 14:52

## 2017-05-10 NOTE — PHYSICAL THERAPY DAILY NOTE
PT Daily Note-Current


Subjective


Pt. tearful and explains that she lost her  1 yr ago and then a sister 

and then a pet and she is grief stricken. States she has lived alone since then 

and has had some falls "but some very handsome and very strong policemen came 

out" and helped her up. Pt. agrees to Rx. Wants to wear her shoes but this PTA 

can only find one.





Pain





   Numeric Pain Scale:  0-No Pain





Appearance


disheveled, hair messy, smell of urine in bed and on gown





Mental Status


Patient Orientation:  Person, Confused


Attachments:  SCD's, Woods Catheter


pt. is unaware that she at [facility] and unaware of the full situation





Transfers


              Functional [place] Measure


0=Not Assessed/NA   4=Minimal Assistance


1=Total Assistance   5=Supervision or Setup


2=Maximal Assistance   6=Modified [place]


3=Moderate Assistance   7=Complete IndependenceIRFPAI Quality Coding Scale











6 Independent with activity with or without an assistive device


 


5  Patient requires set up or clean up by helper.  Patient completes activity  

by  themselves


 


4 Supervision or touching assist (CGA). [place] provide cues , steadying assist


 


3 The helper provides less than half the effort to complete the activity


 


2 The helper provides more than half the effort to complete the activity


 


1 Dependent.  The helper does all the effort to complete an activity 


 


7 Patient refused to complete or attempt activity


 


9 The patient did not perform the activity before the current illness or injury


 


88 Not attempted due to Medical conditions or safety concerns








Transfers (B, C, W/C) (FIM):  4


Scootin


Rollin


Supine to/from Sit:  4


Sit to/from Stand:  4





Gait Training


Does the Patient Walk?:  Yes


Gait (FIM):  1


Distance (FIM):  1=up to 49 ft (6ft)


Gait Level of Assist:  4


Gait Persons Needed:  1


Gait Assistive Device:  FWW


with pt. standing at EOB sidestepping along side of bed 5-6 ft then turning to 

get in front of recliner with CGA and skilled verbal instruction repeated





Exercises


Supine Ex:  Ankle pumps, Quad Set, Rolling, Heel Slides, Short Arc Quads, 

Scooting, Hip abd/add


Supine Reps:  15





Assessment


Current Status:  Good Progress


pt. very emotional , prefers to spend much time talking about her personal life 

and losses.  Moves slowly, forgets the goal and task and stops to talk, but 

eventually completed task. Appears fearful of falling.





PT Short Term Goals


Short Term Goals


Time Frame:  May 23, 2017


Transfers (B,C,W/C) (FIM):  3


Gait (FIM):  1


Distance (FIM):  1=up to 49 ft


Gait Distance Comment:  20'


Gait Level of Assist:  3


Gait Assistive Device:  FWW


Stairs (FIM):  1


# of Steps:  1


Stairs Level of Assist:  3





PT Long Term Goals


Long Term Goals


PT Long Term Goals Time Frame:  2017


Transfers (B,C,W/C) (FIM):  5


Sit to Lying (QC):  5


Lying-Sitting on Side/Bed(QC):  5


Sit to Stand (QC):  5


Rollin


Roll Left to Right (QC):  5


Chair/Bed-to-Chair Xfer(QC):  5


Car Transfer (QC):  5


Does the Patient Walk:  No and Walking Goal IS indicated


Gait (FIM):  2


Gait distance (FIM):  3=752-72 ft


Distance:  75'


Walk 10 feet (QC):  5


Walk 10ft-Uneven Surface(QC):  5


Walk 50ft with 2 Turns (QC):  5


Walk 150 ft (QC):  88


Gait Level of Assist:  5


Gait Assistive Device:  FWW


# of Steps:  4


1 Step (curb) (QC):  4


4 Steps (QC):  4


12 Steps (QC):  88


Stairs Level Of Assist:  5


Picking up an Object (QC):  4





PT Plan


Treatment/Plan


Treatment Plan:  Continue Plan of Care


Treatment Plan:  Bed Mobility, Education, Functional Activity Cha, Group 

Therapy, Gait, Safety, Therapeutic Exercise, Transfers


Treatment Duration:  2017


Visits Per Week:  6-11


Minutes/Day (M-F):  60-90


Minutes/Day (Sat/[place]):  PRN





Safety Risks/Education


Patient Education:  Gait Training, Transfer Techniques, Correct Positioning, 

Safety Issues


Teaching Recipient:  Patient


Teaching Methods:  Demonstration, Discussion


Response to Teaching:  Verbalize Understanding, Return Demonstration, 

Reinforcement Needed





Time/GCodes


Time In:  900


Time Out:  1000


Total Billed Treatment Time:  60


Total Billed Treatment


1,FA40,EX20


G Codes Necessary:  LISA Fontenot PTA May 10, 2017 09:55

## 2017-05-10 NOTE — PM&R POST ADMISSION ASSESSMENT
Post Admission Physician Asses


The preadmission screen agrees with the post admission assessment that the 

patient is a good candidate for inpatient rehabilitation.





The patient will have a comprehensive program of inpatient rehabilitation with 

a goal of maximizing level of functional independence prior to discharge home 

with family and HHC.   The patient will have PT/OT ninety minutes per day, each 

discipline, five days a week for gait strengthening, conditioning, balance, ADLs

, any patient/family/caregiver training necessary.  Speech therapy to do 

cognitive assessment and treat as indicted. Rehabilitation nursing to assist 

with bowel, bladder, Woods cathereter care and a Trial of voiding,skin, wound 

care, medication administration, pain management.   to assist 

with discharge planning, community reentry. SCD's for DVT prophylaxis.





She appears to be well motivated to participate in three hours of therapy a 

day.  She should be able to tolerate three hours of therapy a day from a 

medical standpoint.  She should benefit from the three hours of therapy a day.  

She has a reasonable discharge plan, reasonable discharge rehabilitation goals 

and a supportive family. She has various comorbidities that need to be closely 

monitored with medications and treatments adjusted on a daily basis as needed. 


These include:


Cellulitis of legs


DM





Barriers to discharge for this patient who had been independent prior to this 

are for her to be modified independent to supervision for ADLs and mobility 

skills prior to discharge home with family and HHC, so as to lessen the burden 

of the caregivers. 





Risks for this patient include:


 1.  Fall


 2.  Fracture


 3.  DVT


 4.  Pulmonary embolism


 5.  Wound infection


 6.  Skin breakdown


 7.  Contractures


 8.  Poorly controlled pain


 9.  Urinary retention


10.  UTI


11.  Respiratory infection


12.  Aspiration


13.  Poorly controlled DM


14.worsening cellulitis





Estimated Length of Stay: days





Prognosis:  Rehab prognosis appears good for goal of discharge home with family 

and HHC modified independent to supervision for ADLs and mobility skills.


She may be more appropriate fo ran [facility] if She requires more support upon 

discharge from IRU then family and HHC can provide











ZHANG RODAS MD May 10, 2017 12:37

## 2017-05-10 NOTE — ST COGNITIVE LINGUISTIC EVAL
Speech Evaluation-General


Medical Diagnosis


AMS/UTI


Onset Date:  May 9, 2017





Therapy Diagnosis


Therapy Diagnosis:  Cognitive Linguistic Skills Grossly WNL





Precautions


Precautions/Isolations:  Fall Prevention, Standard Precautions





Referral


Referring Physician:  Dr. Anant Prasad


Reason for Referral:  Evaluation/Treatment


Cognitive Linguistic Evaluation





Medical History


Pertinent Medical History:  Arthritis, DM, Heart Failure, HTN, Neuropathy, OA


Reviewed History:  Yes





Social History


Current Living Status:  Alone





Speech PLF-Current Status


Prior Level of Function





The patient denied prior challenges with cognition, speech, or language.





Subjective


The patient was recently admitted to [facility] with a 

diagnosis of an UTI and AMS. The patient greeted the clinician appropriately 

and agreed to participate in the cognitive linguistic evaluation on this date.





To note: The patient required frequent redirection to task, demonstrating 

tangential thought and poor topic maintenance.





Language Eval: Auditory


Comprehends Simple Yes/No Ques:  Functional


Indent/Objects Multiple Fields:  Functional


Ident/Pics in Multiple Fields:  Functional


Follows 1-Step Commands:  Functional


Follows Complex Directions:  Mild (Following repetition and redirection, the 

patient demonstrated increased accuracy.)


Follows General Conversations:  Mild (Poor topic maintenance demonstrated 

frequently throughout the evaluation.)





Language Eval: Verbal Language


Completes Spontaneous Greeting:  Functional


Produces Auto, Serial Info:  Functional


Imitates Simple Words/Phrases:  Functional


Word Finding:  Functional


Requests Basic Needs:  Functional


States Basic Personal Info:  Functional


Expresses Complex Ideas:  Functional


Poor topic maintenance and redirection to task required.





Cognitive


Patient Orientation


The patient is oriented to month, date, day of week, and year (independently).





Objective Cognitive Domain


Attention:  Moderate


Memory:  WNL


Problem Solving:  Mild





Objective


Impression


The patient demonstrated cognitive linguistic skills grossly within functional 

limits and appropriate for completion of ADL's. Per chart review and discussion

, the patient appears to be functioning at baseline. The patient does verbalize 

recent personal losses of family members, friends, and pets. The patient does 

demonstrate significant poor topic maintenance and frequent tearful expression. 

A consult for psychology may be appropriate.





Communication/Social Cognition


Comprehension:  4


Expression:  5


Social Interaction:  5


Problem Solvin


Memory:  5





Speech Patient Assess


Expression of Ideas/Wants:  Exhibits (3)


Understanding Vebal Content:  Usually Understands (3)


Brief Interview-Mental Status:  Yes


Repetition of Three Words:  Three (3)


Temporal Orientation: Year:  Correct (3)


Temporal Orientation: Month:  Accurate within 5 days(2)


Temporal Orientation: Day:  Correct (1)


Recall : Wear to say "Sock":  Yes, no cue required (2)


Recall : Color:  Yes, no cue required (2)


Recall : Bed:  Yes, no cue required (2)





Speech-Plan


Treatment Plan


Speech Therapy Treatment Plan:  Discontinue ST


Evaluation, only.


Rehab Potential:  Fair





Safety Risks/Education


Teaching Recipient:  Patient


Teaching Methods:  Discussion


Response to Teaching:  Verbalize Understanding


Education Topics Provided:  


Results, Recommendations, Plan of Care





Time


Speech Therapy Time In:  08:05


Speech Therapy Time Out:  08:30


Total Billed Time:  25


Billed Treatment Time


1, HUGH CLARK May 10, 2017 12:59

## 2017-05-10 NOTE — WOUND CARE PROGRESS NOTE
Subjective


Subjective


Subjective/Events-last exam


67-year-old female with recurrent dermatitis of bilateral lower legs and 

superficial scabs of the bilateral feet. The patient's report these appear to 

be improving. Patient has been at bed rest since admission and the edema and 

swelling and inflammation of the legs has gone down. She is encouraged to 

continue elevation of her legs.





Past medical history: History of chronic venous ulcers of the legs, lymphedema, 

and noncompliance with elevation instructions.





Review of Systems


General:  No Chills


Pulmonary:  No Dyspnea


Cardiovascular:  No: Chest Pain





Objective


Exam


Last Set of Vital Signs





Vital Signs








  Date Time  Temp Pulse Resp B/P (MAP) Pulse Ox O2 Delivery O2 Flow Rate FiO2


 


5/10/17 05:15 98.4 84 18 110/64 96 Room Air  





Capillary Refill : Less Than 3 Seconds


I&O


Bad tableGeneral:  Alert, No Acute Distress


Lungs:  Normal Air Movement


Skin:  Other (Bilateral calf dermatitis with markedly reduced edema. Bilateral 

foot ulcers with scab formation.)





Results


Lab


Laboratory Tests


5/9/17 18:42: Hemoglobin A1c 7.1H


5/9/17 20:37: Glucometer 160H


5/10/17 04:49: Glucometer 125H


5/10/17 05:32: 


Sodium Level 137, Potassium Level 4.2, Chloride Level 103, Carbon Dioxide Level 

25, Anion Gap 9, Blood Urea Nitrogen 22H, Creatinine 0.97, Estimat Glomerular 

Filtration Rate 57, BUN/Creatinine Ratio 23, Glucose Level 114H, Calcium Level 

9.2


5/10/17 09:35: 


Urine Color YELLOW, Urine Clarity CLEAR, Urine pH 6, Urine Specific Gravity 

1.015L, Urine Protein 1+H, Urine Glucose (UA) NEGATIVE, Urine Ketones NEGATIVE, 

Urine Nitrite NEGATIVE, Urine Bilirubin 3+H, Urine Urobilinogen NORMAL, Urine 

Leukocyte Esterase 3+H, Urine RBC (Auto) 2+H, Urine RBC 2-5H, Urine WBC 25-50H, 

Urine Squamous Epithelial Cells 10-25H, Urine Crystals NONE, Urine Bacteria FEWH

, Urine Casts NONE, Urine Mucus NEGATIVE, Urine Culture Indicated YES


5/10/17 10:49: Glucometer 159H





Assessment/Plan


1. Bilateral lower extremity venous dermatitis.





2. Healed bilateral foot ulcers, now scabs.





Plan: D ointment to bilateral legs, continue elevation, will see again as 

needed.











ROSHNI MOREIRA MD May 10, 2017 16:10

## 2017-05-10 NOTE — OCCUPATIONAL THER DAILY NOTE
OT Current Status-Daily Note


Subjective


Pt sitting in chair, agrees to treatment.





Mental Status/Objective


              Functional [place] Measure


0=Not Assessed/NA   4=Minimal Assistance


1=Total Assistance   5=Supervision or Setup


2=Maximal Assistance   6=Modified [place]


3=Moderate Assistance   7=Complete [place]


Attachments:  Woods Catheter





ADL-Treatment


Pt states she only does sponge baths at home secondary to not being able to get 

into the shower. Pt agrees to ADLs this morning. Pt completed sponge bath while 

seated in chair. Pt completed upper body bathing with set up and increased 

time. Pt able to wash bilateral upper legs, requires partial assist to wash 

feet. Pt sit to stand with moderate assistance using FWW for balance. Assist 

required to thoroughly wash buttocks and juanita area. Pt requires cues for 

sequencing and task completion. Pt donned pullover gown with minimal assistance 

to pull down in back. Pt able to doff socks with SBA. Donned socks with minimal 

assistance and increased time. Pt combed hair with minimal assistance to remove 

tangles in back. Pt removed dentures and cleaned them while seated in chair. Pt 

able to complete task with increased time. Pt is easily distracted throughout 

session and requires cues to attend to task. Occasional cues for sequencing. 

Increased time is required for all ADL tasks. Pt sitting in chair with needs 

met after session.


              Functional [place] Measure


0=Not Assessed/NA   4=Minimal Assistance


1=Total Assistance   5=Supervision or Setup


2=Maximal Assistance   6=Modified [place]


3=Moderate Assistance   7=Complete [place]IRFPAI Quality Coding Scale











6 Independent with activity with or without an assistive device


 


5  Patient requires set up or clean up by helper.  Patient completes activity  

by  themselves


 


4 Supervision or touching assist (CGA). [place] provide cues , steadying assist


 


3 The helper provides less than half the effort to complete the activity


 


2 The helper provides more than half the effort to complete the activity


 


1 Dependent.  The helper does all the effort to complete an activity 


 


7 Patient refused to complete or attempt activity


 


9 The patient did not perform the activity before the current illness or injury


 


88 Not attempted due to Medical conditions or safety concerns








Grooming (FIM):  4


Oral Hygiene (QC):  4


Bathing (FIM):  3


Bathing Location:  L Arm, R Arm, L Upper Leg, R Upper Leg, Chest, Abdomen


Shower/Bathe Self (QC):  3


Upper Body (FIM):  4


On/Off Footwear (QC):  3





OT Short Term Goals


Short Term Goals


Time Frame:  May 16, 2017


Grooming(FIM):  5


Upper Body Dressing(FIM):  4


Lower Body Dressing(FIM):  3


Transfers (B,C,W/C) (FIM):  3


Toilet/Commode Transfer(FIM):  3


Additional Short Term Goals:  1-Demonstrate ADL Tasks, 2-Verbalize Understanding

, 3-ImproveStrength/Cha


1=Demonstrate adherence to instructed precautions during ADL tasks.


2=Patient will verbalize/demonstrate understanding of assistive devices/

modifications for ADL.


3=Patient will improve strength/tolerance for activity to enable patient to 

perform ADL's.





OT Long Term Goals


Long Term Goals


Time Frame:  May 30, 2017


Eating (FIM):  6


Eating (QC):  6


Groomin


Bathing(FIM):  5


Shower/Bathe Self (QC):  4


Upper Body Dressing(FIM):  5


Upper Body Dressing (QC):  4


Lower Body Dressing(FIM):  5


Lower Body Dressing (QC):  4


On/Off Footwear (QC):  5


Toileting(FIM):  5


Toileting Hygiene (QC):  4


Toilet/Commode Transfer(FIM):  5


Toilet/Commode Transfer (QC):  4


Shower Transfer(FIM):  5


Additional Goals:  1-Demonstrate ADL Tasks, 2-Verbalize Understanding, 3-

ImproveStrength/Cha


1=Demonstrate adherence to instructed precautions during ADL tasks.


2=Patient will verbalize/demonstrate understanding of assistive devices/

modifications for ADL.


3=Patient will improve strength/tolerance for activity to enable patient to 

perform ADL's.





OT Education/Plan


Problem List/Assessment


Pt to benefit from skilled OT intervention for ADL training, transfers, 

strengthening, and safety education to improve level of function and allow safe 

discharge plan.





Discharge Recommendations


Plan/Recommendations:  Continue POC





Treatment Plan/Plan of Care


Patient would benefit from OT for education, treatment and training to promote 

independence in ADL's, mobility, safety and/or upper extremity function for ADL'

s.


Plan of Care:  ADL Retraining, Functional Mobility, Group Exercise/Act as Ind, 

UE Funct Exercise/Act, UE Neuromus Re-Ed/Coord


Treatment Duration:  May 30, 2017


Visits Per Week:  10-12


Minutes/Day (M-F):  60-90


Minutes/Day (Sat/[place]):  PRN


Agreement:  Yes


Rehab Potential:  Fair





Time/GCodes


Start Time:  10:00


Stop Time:  11:30


Total Time Billed (hr/min):  90


Billed Treatment Time


1 visit, ADLx6(90minutes)











FAUSTINO PATE OT May 10, 2017 14:20

## 2017-05-10 NOTE — PHYSICAL THERAPY DAILY NOTE
PT Daily Note-Current


Subjective


Pt. up in recliner, agrees to try standing and up on feet.





Pain





   Numeric Pain Scale:  0-No Pain





Mental Status


Patient Orientation:  Confused


Attachments:  Woods Catheter





Transfers


              Functional [place] Measure


0=Not Assessed/NA   4=Minimal Assistance


1=Total Assistance   5=Supervision or Setup


2=Maximal Assistance   6=Modified [place]


3=Moderate Assistance   7=Complete [place]IRFPAI Quality Coding Scale











6 Independent with activity with or without an assistive device


 


5  Patient requires set up or clean up by helper.  Patient completes activity  

by  themselves


 


4 Supervision or touching assist (CGA). [place] provide cues , steadying assist


 


3 The helper provides less than half the effort to complete the activity


 


2 The helper provides more than half the effort to complete the activity


 


1 Dependent.  The helper does all the effort to complete an activity 


 


7 Patient refused to complete or attempt activity


 


9 The patient did not perform the activity before the current illness or injury


 


88 Not attempted due to Medical conditions or safety concerns








sit to stand from recliner took multiple trails but pt. did this with skilled 

verbal cuing and CGA to min assist. TRF sit to sup required min assist for LEs





Gait Training


Gait Assistive Device:  FWW


7-8 ft sideways and retro to bed from recliner





Exercises


Supine Ex:  Ankle pumps, Rolling, Heel Slides, Straight leg raise, Hip abd/add


Supine Reps:  10


Seated Therapy Exercises:  Long arc quads


Seated Reps:  10





Assessment


Current Status:  Good Progress


easily distracted and wants to visit, confused subject matter





PT Short Term Goals


Short Term Goals


Time Frame:  May 23, 2017


Transfers (B,C,W/C) (FIM):  3


Gait (FIM):  1


Distance (FIM):  1=up to 49 ft


Gait Distance Comment:  20'


Gait Level of Assist:  3


Gait Assistive Device:  FWW


Stairs (FIM):  1


# of Steps:  1


Stairs Level of Assist:  3





PT Long Term Goals


Long Term Goals


PT Long Term Goals Time Frame:  2017


Transfers (B,C,W/C) (FIM):  5


Sit to Lying (QC):  5


Lying-Sitting on Side/Bed(QC):  5


Sit to Stand (QC):  5


Rollin


Roll Left to Right (QC):  5


Chair/Bed-to-Chair Xfer(QC):  5


Car Transfer (QC):  5


Does the Patient Walk:  No and Walking Goal IS indicated


Gait (FIM):  2


Gait distance (FIM):  4=835-32 ft


Distance:  75'


Walk 10 feet (QC):  5


Walk 10ft-Uneven Surface(QC):  5


Walk 50ft with 2 Turns (QC):  5


Walk 150 ft (QC):  88


Gait Level of Assist:  5


Gait Assistive Device:  FWW


# of Steps:  4


1 Step (curb) (QC):  4


4 Steps (QC):  4


12 Steps (QC):  88


Stairs Level Of Assist:  5


Picking up an Object (QC):  4





PT Plan


Treatment/Plan


Treatment Plan:  Continue Plan of Care


Treatment Plan:  Bed Mobility, Education, Functional Activity Cha, Group 

Therapy, Gait, Safety, Therapeutic Exercise, Transfers


Treatment Duration:  2017


Visits Per Week:  6-11


Minutes/Day (M-F):  60-90


Minutes/Day (Sat/[place]):  PRN





Safety Risks/Education


Patient Education:  Gait Training, Transfer Techniques


Teaching Recipient:  Patient


Teaching Methods:  Demonstration, Discussion


Response to Teaching:  Verbalize Understanding, Return Demonstration, 

Reinforcement Needed





Time/GCodes


Time In:  1430


Time Out:  1500


Total Billed Treatment Time:  30


Total Billed Treatment


1,FA20m,EX10m


G Codes Necessary:  No











LISA VENTURA PTA May 10, 2017 15:06

## 2017-05-11 VITALS — SYSTOLIC BLOOD PRESSURE: 112 MMHG | DIASTOLIC BLOOD PRESSURE: 66 MMHG

## 2017-05-11 VITALS — SYSTOLIC BLOOD PRESSURE: 121 MMHG | DIASTOLIC BLOOD PRESSURE: 62 MMHG

## 2017-05-11 VITALS — DIASTOLIC BLOOD PRESSURE: 67 MMHG | SYSTOLIC BLOOD PRESSURE: 125 MMHG

## 2017-05-11 LAB
ANION GAP SERPL CALC-SCNC: 13 MMOL/L (ref 5–14)
BUN SERPL-MCNC: 18 MG/DL (ref 7–18)
BUN/CREAT SERPL: 22
CALCIUM SERPL-MCNC: 9.7 MG/DL (ref 8.5–10.1)
CHLORIDE SERPL-SCNC: 103 MMOL/L (ref 98–107)
CO2 SERPL-SCNC: 21 MMOL/L (ref 21–32)
CREAT SERPL-MCNC: 0.83 MG/DL (ref 0.6–1.3)
ERYTHROCYTE [DISTWIDTH] IN BLOOD BY AUTOMATED COUNT: 14.6 % (ref 10–14.5)
GFR SERPLBLD BASED ON 1.73 SQ M-ARVRAT: > 60 ML/MIN
GLUCOSE SERPL-MCNC: 136 MG/DL (ref 70–105)
MCH RBC QN AUTO: 27 PG (ref 25–34)
MCHC RBC AUTO-ENTMCNC: 32 G/DL (ref 32–36)
MCV RBC AUTO: 82 FL (ref 80–99)
PLATELET # BLD: 304 10^3/UL (ref 130–400)
PMV BLD AUTO: 10.3 FL (ref 7.4–10.4)
POTASSIUM SERPL-SCNC: 5.3 MMOL/L (ref 3.6–5)
RBC # BLD AUTO: 4.45 10^6/UL (ref 4.35–5.85)
SODIUM SERPL-SCNC: 137 MMOL/L (ref 135–145)
WBC # BLD AUTO: 7.9 10^3/UL (ref 4.3–11)

## 2017-05-11 RX ADMIN — PREGABALIN SCH MG: 75 CAPSULE ORAL at 21:04

## 2017-05-11 RX ADMIN — METOPROLOL SUCCINATE SCH MG: 50 TABLET, EXTENDED RELEASE ORAL at 09:14

## 2017-05-11 RX ADMIN — CLINDAMYCIN HYDROCHLORIDE SCH MG: 150 CAPSULE ORAL at 06:24

## 2017-05-11 RX ADMIN — TROSPIUM CHLORIDE SCH MG: 20 TABLET, FILM COATED ORAL at 06:24

## 2017-05-11 RX ADMIN — INSULIN ASPART SCH UNIT: 100 INJECTION, SOLUTION INTRAVENOUS; SUBCUTANEOUS at 21:04

## 2017-05-11 RX ADMIN — CLINDAMYCIN HYDROCHLORIDE SCH MG: 150 CAPSULE ORAL at 11:38

## 2017-05-11 RX ADMIN — FUROSEMIDE SCH MG: 40 TABLET ORAL at 09:14

## 2017-05-11 RX ADMIN — ROPINIROLE SCH MG: 1 TABLET ORAL at 09:13

## 2017-05-11 RX ADMIN — CLINDAMYCIN HYDROCHLORIDE SCH MG: 150 CAPSULE ORAL at 00:27

## 2017-05-11 RX ADMIN — INSULIN ASPART SCH UNIT: 100 INJECTION, SOLUTION INTRAVENOUS; SUBCUTANEOUS at 17:24

## 2017-05-11 RX ADMIN — METFORMIN HYDROCHLORIDE SCH MG: 500 TABLET, FILM COATED ORAL at 17:14

## 2017-05-11 RX ADMIN — TROSPIUM CHLORIDE SCH MG: 20 TABLET, FILM COATED ORAL at 17:15

## 2017-05-11 RX ADMIN — INSULIN ASPART SCH UNIT: 100 INJECTION, SOLUTION INTRAVENOUS; SUBCUTANEOUS at 11:27

## 2017-05-11 RX ADMIN — INSULIN ASPART SCH UNIT: 100 INJECTION, SOLUTION INTRAVENOUS; SUBCUTANEOUS at 07:37

## 2017-05-11 RX ADMIN — ROPINIROLE SCH MG: 1 TABLET ORAL at 21:04

## 2017-05-11 RX ADMIN — CLINDAMYCIN HYDROCHLORIDE SCH MG: 150 CAPSULE ORAL at 17:17

## 2017-05-11 RX ADMIN — PREGABALIN SCH MG: 75 CAPSULE ORAL at 13:55

## 2017-05-11 RX ADMIN — ATORVASTATIN CALCIUM SCH MG: 20 TABLET, FILM COATED ORAL at 21:04

## 2017-05-11 RX ADMIN — DULOXETINE HYDROCHLORIDE SCH MG: 30 CAPSULE, DELAYED RELEASE ORAL at 09:13

## 2017-05-11 RX ADMIN — CLINDAMYCIN HYDROCHLORIDE SCH MG: 150 CAPSULE ORAL at 23:35

## 2017-05-11 RX ADMIN — METFORMIN HYDROCHLORIDE SCH MG: 500 TABLET, FILM COATED ORAL at 06:24

## 2017-05-11 RX ADMIN — PREGABALIN SCH MG: 75 CAPSULE ORAL at 09:14

## 2017-05-11 RX ADMIN — Medication SCH MG: at 21:04

## 2017-05-11 RX ADMIN — NYSTATIN SCH GM: 100000 CREAM TOPICAL at 21:04

## 2017-05-11 RX ADMIN — NYSTATIN SCH GM: 100000 CREAM TOPICAL at 09:16

## 2017-05-11 RX ADMIN — LORATADINE SCH MG: 10 TABLET ORAL at 09:14

## 2017-05-11 RX ADMIN — Medication SCH MG: at 09:14

## 2017-05-11 RX ADMIN — ENOXAPARIN SODIUM SCH MG: 100 INJECTION SUBCUTANEOUS at 09:13

## 2017-05-11 RX ADMIN — NYSTATIN SCH GM: 100000 CREAM TOPICAL at 13:56

## 2017-05-11 RX ADMIN — PANTOPRAZOLE SODIUM SCH MG: 40 TABLET, DELAYED RELEASE ORAL at 06:24

## 2017-05-11 NOTE — PHYSICAL THERAPY DAILY NOTE
PT Daily Note-Current


Subjective


Agrees to PT.  Reports she is tired from yesterday.





Transfers


              Functional [place] Measure


0=Not Assessed/NA   4=Minimal Assistance


1=Total Assistance   5=Supervision or Setup


2=Maximal Assistance   6=Modified [place]


3=Moderate Assistance   7=Complete [place]IRFPAI Quality Coding Scale











6 Independent with activity with or without an assistive device


 


5  Patient requires set up or clean up by helper.  Patient completes activity  

by  themselves


 


4 Supervision or touching assist (CGA). [place] provide cues , steadying assist


 


3 The helper provides less than half the effort to complete the activity


 


2 The helper provides more than half the effort to complete the activity


 


1 Dependent.  The helper does all the effort to complete an activity 


 


7 Patient refused to complete or attempt activity


 


9 The patient did not perform the activity before the current illness or injury


 


88 Not attempted due to Medical conditions or safety concerns








Transfers (B, C, W/C) (FIM):  4 (CGA with sit to for stand transfer)


Sit to Stand (QC):  4


skilled cues for sequencing.  Multiple sit to stand transfers throughout 

treatment.





Gait Training


Does the Patient Walk?:  Yes


Gait (FIM):  2


Gait Assistive Device:  FWW


50 ft x 5; 100 ft and 50 ft with FWW with SB-CGA; slow gait pattern with 

frequent standing stops--pt talks throughout the treatment and is often 

distracted from her task while she tells a story.  Needs frequent cues to keep 

walking.  Walks slow with decreased step length and decreased DF B.





Exercises


Seated Therapy Exercises:  Ankle pumps, Long arc quads, Hip flexion


Seated Reps:  15 (LE strength to improve transfers and gait)


NuStep Minutes:  8





Assessment


Current Status:  Good Progress


Transfers are improving and gait is progressing.  making functional gains.  

Slow with all mobility and talks throughout, needing cues to stay on task.





PT Short Term Goals


Short Term Goals


Time Frame:  May 23, 2017


Transfers (B,C,W/C) (FIM):  3


Gait (FIM):  1


Distance (FIM):  1=up to 49 ft


Gait Distance Comment:  20'


Gait Level of Assist:  3


Gait Assistive Device:  FWW


Stairs (FIM):  1


# of Steps:  1


Stairs Level of Assist:  3





PT Long Term Goals


Long Term Goals


PT Long Term Goals Time Frame:  2017


Transfers (B,C,W/C) (FIM):  5


Sit to Lying (QC):  5


Lying-Sitting on Side/Bed(QC):  5


Sit to Stand (QC):  5


Rollin


Roll Left to Right (QC):  5


Chair/Bed-to-Chair Xfer(QC):  5


Car Transfer (QC):  5


Does the Patient Walk:  No and Walking Goal IS indicated


Gait (FIM):  2


Gait distance (FIM):  0=557-37 ft


Distance:  75'


Walk 10 feet (QC):  5


Walk 10ft-Uneven Surface(QC):  5


Walk 50ft with 2 Turns (QC):  5


Walk 150 ft (QC):  88


Gait Level of Assist:  5


Gait Assistive Device:  FWW


# of Steps:  4


1 Step (curb) (QC):  4


4 Steps (QC):  4


12 Steps (QC):  88


Stairs Level Of Assist:  5


Picking up an Object (QC):  4





PT Plan


Problem List


Problem List:  Activity Tolerance, Functional Strength, Safety, Balance, Gait, 

Transfer, Bed Mobility





Treatment/Plan


Treatment Plan:  Continue Plan of Care


Treatment Plan:  Bed Mobility, Education, Functional Activity Cha, Group 

Therapy, Gait, Safety, Therapeutic Exercise, Transfers


Treatment Duration:  2017


Visits Per Week:  6-11


Minutes/Day (M-F):  60-90


Minutes/Day (Sat/[place]):  PRN





Safety Risks/Education


Patient Education:  Gait Training, Transfer Techniques


Teaching Recipient:  Patient


Teaching Methods:  Demonstration, Discussion


Response to Teaching:  Reinforcement Needed





Time/GCodes


Time In:  1115


Time Out:  1230


Total Billed Treatment Time:  75


Total Billed Treatment


visit


EX 30


FA 15


GT 30











SCHUYLER PINEDO PT May 11, 2017 12:31

## 2017-05-11 NOTE — PROGRESS NOTE (SOAP)
Subjective


Subjective/Events-last exam


general debility.


Patient yesterday 2 person assist.


UA culture negative.


Bilateral lower venous extremity dermatitis.


He'll bilateral foot ulcer.


Patient just has scabs there now.


Diabetes.


Remove [place] catheter





Objective


Exam





Vital Signs








  Date Time  Temp Pulse Resp B/P (MAP) Pulse Ox O2 Delivery O2 Flow Rate FiO2


 


17 05:00 96.8 71 22 121/62 97 Room Air  


 


5/10/17 18:53 98.7 69 16 120/67 98 Room Air  














I & O 


 


 17





 07:00


 


Intake Total 910 ml


 


Output Total 1100 ml


 


Balance -190 ml





Capillary Refill : Less Than 3 Seconds


General Appearance:  No Apparent Distress, WD/WN


HEENT:  Normal ENT Inspection


Neck:  Full Range of Motion, Normal Inspection


Respiratory:  Chest Non Tender, Lungs Clear, Normal Breath Sounds, No Accessory 

Muscle Use, No Respiratory Distress


Cardiovascular:  Regular Rate, Rhythm, No Murmur


Gastrointestinal:  non tender, soft





Results


Lab


Laboratory Tests


5/10/17 09:35: 


Urine Color YELLOW, Urine Clarity CLEAR, Urine pH 6, Urine Specific Gravity 

1.015L, Urine Protein 1+H, Urine Glucose (UA) NEGATIVE, Urine Ketones NEGATIVE, 

Urine Nitrite NEGATIVE, Urine Bilirubin 3+H, Urine Urobilinogen NORMAL, Urine 

Leukocyte Esterase 3+H, Urine RBC (Auto) 2+H, Urine RBC 2-5H, Urine WBC 25-50H, 

Urine Squamous Epithelial Cells 10-25H, Urine Crystals NONE, Urine Bacteria FEWH

, Urine Casts NONE, Urine Mucus NEGATIVE, Urine Culture Indicated YES


5/10/17 10:49: Glucometer 159H


5/10/17 16:04: Glucometer 128H


5/10/17 21:09: Glucometer 152H


17 06:37: Glucometer 117H





Microbiology


5/10/17 Urine Culture - Preliminary, Resulted


          NO GROWTH





Assessment/Plan


Assessment/Plan


Assess & Plan/Chief Complaint


general debility.


Foot ulcers good.


Diabetes 


2 person assist





Clinical Quality Measures


DVT/VTE Risk/Contraindication:


Risk Factor Score Per Nursin


RFS Level Per Nursing on Admit:  4+=Very High











LELAND DACOSTA DO May 11, 2017 07:55

## 2017-05-11 NOTE — THERAPY GROUP DAILY NOTE
Therapy Daily Group Note


Exercises


LE Seated Exercise, UE Exercise





Other/Notes


This patient actively participated in group therapy to include social 

interaction and appropriate conversation for introductions and telling where 

she was from; in addition she participated in social interaction with words of 

encouragement to others on the unit.  She participated in a game of "Family Feud

" that required cognitive thinking paired with working with others on her team 

to find answers.  She participated in tossing a bean bag to signal she had an 

answer and then performed sit to stand transfers to stand while answering the 

question.  She also participated in U/LE ther ex activity to promote UE/LE 

strength and ROM for functional self care and mobility progression.  She was 

active with the group and seemed to enjoy the friendly competition.  Post game, 

she commented, "I think our team won!"  


This group activity required social interaction as will be needed upon 

discharge home, LE/UE strength for self care and functional transfer training.


Start Time:  13:00


Stop Time:  14:15


Total Billed Treatment Time:  75


Total Billed Treatment


visit


Group 75











SCHUYLER PINEDO PT May 11, 2017 14:35

## 2017-05-11 NOTE — PHYSICAL THERAPY DAILY NOTE
PT Daily Note-Current


Subjective


Agrees.  No complaints.





Transfers


              Functional [place] Measure


0=Not Assessed/NA   4=Minimal Assistance


1=Total Assistance   5=Supervision or Setup


2=Maximal Assistance   6=Modified [place]


3=Moderate Assistance   7=Complete [place]IRFPAI Quality Coding Scale











6 Independent with activity with or without an assistive device


 


5  Patient requires set up or clean up by helper.  Patient completes activity  

by  themselves


 


4 Supervision or touching assist (CGA). [place] provide cues , steadying assist


 


3 The helper provides less than half the effort to complete the activity


 


2 The helper provides more than half the effort to complete the activity


 


1 Dependent.  The helper does all the effort to complete an activity 


 


7 Patient refused to complete or attempt activity


 


9 The patient did not perform the activity before the current illness or injury


 


88 Not attempted due to Medical conditions or safety concerns











Gait Training


Pt ambulated x 60 ft with FWW with SB-CGA with skilled cues to stay on task and 

to increase step length.  Pt ambulates with ER of right LE and limited DF and 

decreased step length.





Assessment


Current Status:  Good Progress


Progressing towards PT goals.





PT Short Term Goals


Short Term Goals


Time Frame:  May 23, 2017


Transfers (B,C,W/C) (FIM):  3


Gait (FIM):  1


Distance (FIM):  1=up to 49 ft


Gait Distance Comment:  20'


Gait Level of Assist:  3


Gait Assistive Device:  FWW


Stairs (FIM):  1


# of Steps:  1


Stairs Level of Assist:  3





PT Long Term Goals


Long Term Goals


PT Long Term Goals Time Frame:  2017


Transfers (B,C,W/C) (FIM):  5


Sit to Lying (QC):  5


Lying-Sitting on Side/Bed(QC):  5


Sit to Stand (QC):  5


Rollin


Roll Left to Right (QC):  5


Chair/Bed-to-Chair Xfer(QC):  5


Car Transfer (QC):  5


Does the Patient Walk:  No and Walking Goal IS indicated


Gait (FIM):  2


Gait distance (FIM):  8=079-97 ft


Distance:  75'


Walk 10 feet (QC):  5


Walk 10ft-Uneven Surface(QC):  5


Walk 50ft with 2 Turns (QC):  5


Walk 150 ft (QC):  88


Gait Level of Assist:  5


Gait Assistive Device:  FWW


# of Steps:  4


1 Step (curb) (QC):  4


4 Steps (QC):  4


12 Steps (QC):  88


Stairs Level Of Assist:  5


Picking up an Object (QC):  4





PT Plan


Treatment/Plan


Treatment Plan:  Continue Plan of Care


Treatment Plan:  Bed Mobility, Education, Functional Activity Cha, Group 

Therapy, Gait, Safety, Therapeutic Exercise, Transfers


Treatment Duration:  2017


Visits Per Week:  6-11


Minutes/Day (M-F):  60-90


Minutes/Day (Sat/[place]):  PRN





Time/GCodes


Time In:  1415


Time Out:  1430


Total Billed Treatment Time:  15


Total Billed Treatment


visit


GT 15











SCHUYLER PINEDO PT May 11, 2017 15:38

## 2017-05-11 NOTE — OCCUPATIONAL THER DAILY NOTE
OT Current Status-Daily Note


Subjective


Pt siting EOB, agrees to treatment. Pt reports 8/10 back pain.





Mental Status/Objective


              Functional [place] Measure


0=Not Assessed/NA   4=Minimal Assistance


1=Total Assistance   5=Supervision or Setup


2=Maximal Assistance   6=Modified [place]


3=Moderate Assistance   7=Complete [place]





ADL-Treatment


Pt sit to stand from EOB with moderate assistance. Pt states she feels "stiff" 

this morning. Transfer to chair with minimal assistance using FWW. Sponge bath 

completed seated in chair. Pt doffed shirt with SBA. Bathed upper body with set 

up and increased time. Pt washed upper and lower legs with SBA. Stood with 

minimal assistance to wash buttocks and juanita area. Don pullover shirt with set 

up. Pt donned socks with SBA. Pt required assist to thread catheter through 

pant leg. Required moderate assistance to stand and complete pant hike. Pt 

requests to use BSC. Transfer to BSC with minimal assistance using FWW. Pt 

unable to have BM. Requires assist for clothing management. Pt moves slowly and 

is easily distracted; requires increased time for all ADL activities and cues 

to attend to task. Pt sitting in chair with needs met after session.


              Functional [place] Measure


0=Not Assessed/NA   4=Minimal Assistance


1=Total Assistance   5=Supervision or Setup


2=Maximal Assistance   6=Modified [place]


3=Moderate Assistance   7=Complete IndependenceIRFPAI Quality Coding Scale











6 Independent with activity with or without an assistive device


 


5  Patient requires set up or clean up by helper.  Patient completes activity  

by  themselves


 


4 Supervision or touching assist (CGA). [place] provide cues , steadying assist


 


3 The helper provides less than half the effort to complete the activity


 


2 The helper provides more than half the effort to complete the activity


 


1 Dependent.  The helper does all the effort to complete an activity 


 


7 Patient refused to complete or attempt activity


 


9 The patient did not perform the activity before the current illness or injury


 


88 Not attempted due to Medical conditions or safety concerns








Eating (FIM):  5 (Set up per nursing)


Eating (QC):  5


Bathing (FIM):  4


Shower/Bathe Self (QC):  3


Upper Body (FIM):  5


Upper Body Dressing (QC):  4


Lower Body Dressing (FIM):  3


Lower Body Dressing (QC):  3


On/Off Footwear (QC):  5


Toileting (FIM):  2


Toilet/Commode Transfer (FIM):  4





OT Short Term Goals


Short Term Goals


Time Frame:  May 16, 2017


Grooming(FIM):  5


Upper Body Dressing(FIM):  4


Lower Body Dressing(FIM):  3


Transfers (B,C,W/C) (FIM):  3


Toilet/Commode Transfer(FIM):  3


Additional Short Term Goals:  1-Demonstrate ADL Tasks, 2-Verbalize Understanding

, 3-ImproveStrength/Cha


1=Demonstrate adherence to instructed precautions during ADL tasks.


2=Patient will verbalize/demonstrate understanding of assistive devices/

modifications for ADL.


3=Patient will improve strength/tolerance for activity to enable patient to 

perform ADL's.





OT Long Term Goals


Long Term Goals


Time Frame:  May 30, 2017


Eating (FIM):  6


Eating (QC):  6


Groomin


Bathing(FIM):  5


Shower/Bathe Self (QC):  4


Upper Body Dressing(FIM):  5


Upper Body Dressing (QC):  4


Lower Body Dressing(FIM):  5


Lower Body Dressing (QC):  4


On/Off Footwear (QC):  5


Toileting(FIM):  5


Toileting Hygiene (QC):  4


Toilet/Commode Transfer(FIM):  5


Toilet/Commode Transfer (QC):  4


Shower Transfer(FIM):  5


Additional Goals:  1-Demonstrate ADL Tasks, 2-Verbalize Understanding, 3-

ImproveStrength/Cha


1=Demonstrate adherence to instructed precautions during ADL tasks.


2=Patient will verbalize/demonstrate understanding of assistive devices/

modifications for ADL.


3=Patient will improve strength/tolerance for activity to enable patient to 

perform ADL's.





OT Education/Plan


Problem List/Assessment


Pt to benefit from skilled OT intervention for ADL training, transfers, 

strengthening, and safety education to improve level of function and allow safe 

discharge plan.





Discharge Recommendations


Plan/Recommendations:  Continue POC





Treatment Plan/Plan of Care


Patient would benefit from OT for education, treatment and training to promote 

independence in ADL's, mobility, safety and/or upper extremity function for ADL'

s.


Plan of Care:  ADL Retraining, Functional Mobility, Group Exercise/Act as Ind, 

UE Funct Exercise/Act, UE Neuromus Re-Ed/Coord


Treatment Duration:  May 30, 2017


Visits Per Week:  10-12


Minutes/Day (M-F):  60-90


Minutes/Day (Sat/[place]):  PRN


Agreement:  Yes


Rehab Potential:  Fair





Time/GCodes


Start Time:  10:00


Stop Time:  11:15


Total Time Billed (hr/min):  75


Billed Treatment Time


1 visit, ADLx5(75minutes)











FAUSTINO PATE OT May 11, 2017 11:48

## 2017-05-11 NOTE — OCCUPATIONAL THER DAILY NOTE
OT Current Status-Daily Note


Subjective


Pt sitting in chair, agrees to treatment.





Mental Status/Objective


              Functional [place] Measure


0=Not Assessed/NA   4=Minimal Assistance


1=Total Assistance   5=Supervision or Setup


2=Maximal Assistance   6=Modified [place]


3=Moderate Assistance   7=Complete [place]





ADL-Treatment


              Functional [place] Measure


0=Not Assessed/NA   4=Minimal Assistance


1=Total Assistance   5=Supervision or Setup


2=Maximal Assistance   6=Modified [place]


3=Moderate Assistance   7=Complete IndependenceIRFPAI Quality Coding Scale











6 Independent with activity with or without an assistive device


 


5  Patient requires set up or clean up by helper.  Patient completes activity  

by  themselves


 


4 Supervision or touching assist (CGA). [place] provide cues , steadying assist


 


3 The helper provides less than half the effort to complete the activity


 


2 The helper provides more than half the effort to complete the activity


 


1 Dependent.  The helper does all the effort to complete an activity 


 


7 Patient refused to complete or attempt activity


 


9 The patient did not perform the activity before the current illness or injury


 


88 Not attempted due to Medical conditions or safety concerns











Other Treatment


Pt agrees to UE exercises while seated. Pt states she is familiar with 

exercises from previous therapy. Pt performed bilateral UE exercises to promote 

increased strength needed for ADLs and transfers. Pt completed shoulder flexion

, abduction, and biceps curls x12 reps with minimal resistance (yellow) 

theraband. Rest breaks between exercises. Occasional cues required for proper 

exercise technique. Pt requires cues to stay on task. Pt sitting in chair with 

needs met after session.





Education


OT Patient Education:  Exercise program


Teaching Recipient:  Patient


Teaching Methods:  Demonstration, Discussion


Response to Teaching:  Verbalize Understanding, Reinforcement Needed





OT Short Term Goals


Short Term Goals


Time Frame:  May 16, 2017


Grooming(FIM):  5


Upper Body Dressing(FIM):  4


Lower Body Dressing(FIM):  3


Transfers (B,C,W/C) (FIM):  3


Toilet/Commode Transfer(FIM):  3


Additional Short Term Goals:  1-Demonstrate ADL Tasks, 2-Verbalize Understanding

, 3-ImproveStrength/Cha


1=Demonstrate adherence to instructed precautions during ADL tasks.


2=Patient will verbalize/demonstrate understanding of assistive devices/

modifications for ADL.


3=Patient will improve strength/tolerance for activity to enable patient to 

perform ADL's.





OT Long Term Goals


Long Term Goals


Time Frame:  May 30, 2017


Eating (FIM):  6


Eating (QC):  6


Groomin


Bathing(FIM):  5


Shower/Bathe Self (QC):  4


Upper Body Dressing(FIM):  5


Upper Body Dressing (QC):  4


Lower Body Dressing(FIM):  5


Lower Body Dressing (QC):  4


On/Off Footwear (QC):  5


Toileting(FIM):  5


Toileting Hygiene (QC):  4


Toilet/Commode Transfer(FIM):  5


Toilet/Commode Transfer (QC):  4


Shower Transfer(FIM):  5


Additional Goals:  1-Demonstrate ADL Tasks, 2-Verbalize Understanding, 3-

ImproveStrength/Cha


1=Demonstrate adherence to instructed precautions during ADL tasks.


2=Patient will verbalize/demonstrate understanding of assistive devices/

modifications for ADL.


3=Patient will improve strength/tolerance for activity to enable patient to 

perform ADL's.





OT Education/Plan


Problem List/Assessment


Pt to benefit from skilled OT intervention for ADL training, transfers, 

strengthening, and safety education to improve level of function and allow safe 

discharge plan.





Discharge Recommendations


Plan/Recommendations:  Continue POC





Treatment Plan/Plan of Care


Patient would benefit from OT for education, treatment and training to promote 

independence in ADL's, mobility, safety and/or upper extremity function for ADL'

s.


Plan of Care:  ADL Retraining, Functional Mobility, Group Exercise/Act as Ind, 

UE Funct Exercise/Act, UE Neuromus Re-Ed/Coord


Treatment Duration:  May 30, 2017


Visits Per Week:  10-12


Minutes/Day (M-F):  60-90


Minutes/Day (Sat/[place]):  PRN


Agreement:  Yes


Rehab Potential:  Fair





Time/GCodes


Start Time:  14:30


Stop Time:  14:45


Total Time Billed (hr/min):  15


Billed Treatment Time


1 visit, EX(15minutes)











FAUSTINO PATE OT May 11, 2017 15:01

## 2017-05-12 VITALS — SYSTOLIC BLOOD PRESSURE: 128 MMHG | DIASTOLIC BLOOD PRESSURE: 74 MMHG

## 2017-05-12 VITALS — SYSTOLIC BLOOD PRESSURE: 145 MMHG | DIASTOLIC BLOOD PRESSURE: 77 MMHG

## 2017-05-12 RX ADMIN — INSULIN ASPART SCH UNIT: 100 INJECTION, SOLUTION INTRAVENOUS; SUBCUTANEOUS at 20:47

## 2017-05-12 RX ADMIN — ATORVASTATIN CALCIUM SCH MG: 20 TABLET, FILM COATED ORAL at 20:17

## 2017-05-12 RX ADMIN — INSULIN ASPART SCH UNIT: 100 INJECTION, SOLUTION INTRAVENOUS; SUBCUTANEOUS at 16:33

## 2017-05-12 RX ADMIN — ROPINIROLE SCH MG: 1 TABLET ORAL at 08:39

## 2017-05-12 RX ADMIN — LORATADINE SCH MG: 10 TABLET ORAL at 08:39

## 2017-05-12 RX ADMIN — METFORMIN HYDROCHLORIDE SCH MG: 500 TABLET, FILM COATED ORAL at 16:32

## 2017-05-12 RX ADMIN — PREGABALIN SCH MG: 75 CAPSULE ORAL at 08:39

## 2017-05-12 RX ADMIN — FUROSEMIDE SCH MG: 40 TABLET ORAL at 08:40

## 2017-05-12 RX ADMIN — CLINDAMYCIN HYDROCHLORIDE SCH MG: 150 CAPSULE ORAL at 06:09

## 2017-05-12 RX ADMIN — Medication SCH MG: at 20:17

## 2017-05-12 RX ADMIN — NYSTATIN SCH GM: 100000 CREAM TOPICAL at 10:14

## 2017-05-12 RX ADMIN — NYSTATIN SCH GM: 100000 CREAM TOPICAL at 13:22

## 2017-05-12 RX ADMIN — INSULIN ASPART SCH UNIT: 100 INJECTION, SOLUTION INTRAVENOUS; SUBCUTANEOUS at 05:42

## 2017-05-12 RX ADMIN — METOPROLOL SUCCINATE SCH MG: 50 TABLET, EXTENDED RELEASE ORAL at 08:40

## 2017-05-12 RX ADMIN — CLINDAMYCIN HYDROCHLORIDE SCH MG: 150 CAPSULE ORAL at 12:14

## 2017-05-12 RX ADMIN — DULOXETINE HYDROCHLORIDE SCH MG: 30 CAPSULE, DELAYED RELEASE ORAL at 08:39

## 2017-05-12 RX ADMIN — TROSPIUM CHLORIDE SCH MG: 20 TABLET, FILM COATED ORAL at 06:09

## 2017-05-12 RX ADMIN — PANTOPRAZOLE SODIUM SCH MG: 40 TABLET, DELAYED RELEASE ORAL at 06:09

## 2017-05-12 RX ADMIN — PREGABALIN SCH MG: 75 CAPSULE ORAL at 12:14

## 2017-05-12 RX ADMIN — NYSTATIN SCH GM: 100000 CREAM TOPICAL at 20:18

## 2017-05-12 RX ADMIN — INSULIN ASPART SCH UNIT: 100 INJECTION, SOLUTION INTRAVENOUS; SUBCUTANEOUS at 11:37

## 2017-05-12 RX ADMIN — CLINDAMYCIN HYDROCHLORIDE SCH MG: 150 CAPSULE ORAL at 16:32

## 2017-05-12 RX ADMIN — PREGABALIN SCH MG: 75 CAPSULE ORAL at 20:17

## 2017-05-12 RX ADMIN — ROPINIROLE SCH MG: 1 TABLET ORAL at 20:17

## 2017-05-12 RX ADMIN — TROSPIUM CHLORIDE SCH MG: 20 TABLET, FILM COATED ORAL at 16:32

## 2017-05-12 RX ADMIN — METFORMIN HYDROCHLORIDE SCH MG: 500 TABLET, FILM COATED ORAL at 06:09

## 2017-05-12 RX ADMIN — ENOXAPARIN SODIUM SCH MG: 100 INJECTION SUBCUTANEOUS at 08:41

## 2017-05-12 RX ADMIN — Medication SCH MG: at 08:40

## 2017-05-12 NOTE — OCCUPATIONAL THER DAILY NOTE
OT Current Status-Daily Note


Subjective


Pt sitting in chair, agrees to treatment.





Mental Status/Objective


              Functional [place] Measure


0=Not Assessed/NA   4=Minimal Assistance


1=Total Assistance   5=Supervision or Setup


2=Maximal Assistance   6=Modified [place]


3=Moderate Assistance   7=Complete [place]





ADL-Treatment


Pt agrees to shower this am. Sit to stand from chair with SBA. Gait to restroom 

with FWW, slow pace. Transfer to walk in shower with CGA and cues for safety, 

using grab bars for balance. Pt doffed nightgown and socks with SBA. Seated 

bathing completed using hand held shower. Upper body bathing completed with 

SBA. Pt able to wash bilateral LE and juanita area. Stood with CGA for balance 

while washing buttocks. Pt takes much increased time with shower. Don pullover 

shirt with minimal assistance. Pt required minimal assistance to start 

underwear and pants over left foot. Sit to stand with SBA. Pt required minimal 

assistance for pant hike. Pt donned socks with minimal assistance. Pt combed 

hair with SBA. Toilet transfer with SBA using grab bars; cues for safety. Pt 

able to complete toileting hygiene with SBA. Transfer to chair with SBA. Pt 

moves slowly throughout treatment and requires increased time for all ADLs 

tasks. Pt easily distracted with conversation and requires cues to attend to 

task. Pt sitting in chair with needs met after session.


              Functional [place] Measure


0=Not Assessed/NA   4=Minimal Assistance


1=Total Assistance   5=Supervision or Setup


2=Maximal Assistance   6=Modified [place]


3=Moderate Assistance   7=Complete IndependenceIRFPAI Quality Coding Scale











6 Independent with activity with or without an assistive device


 


5  Patient requires set up or clean up by helper.  Patient completes activity  

by  themselves


 


4 Supervision or touching assist (CGA). [place] provide cues , steadying assist


 


3 The helper provides less than half the effort to complete the activity


 


2 The helper provides more than half the effort to complete the activity


 


1 Dependent.  The helper does all the effort to complete an activity 


 


7 Patient refused to complete or attempt activity


 


9 The patient did not perform the activity before the current illness or injury


 


88 Not attempted due to Medical conditions or safety concerns








Grooming (FIM):  5


Bathing (FIM):  4


Shower/Bathe Self (QC):  3


Upper Body (FIM):  4


On/Off Footwear (QC):  4


Toilet/Commode Transfer (FIM):  5


Shower Transfer(FIM):  4 (CGA)





OT Short Term Goals


Short Term Goals


Time Frame:  May 16, 2017


Grooming(FIM):  5


Upper Body Dressing(FIM):  4


Lower Body Dressing(FIM):  3


Transfers (B,C,W/C) (FIM):  3


Toilet/Commode Transfer(FIM):  3


Additional Short Term Goals:  1-Demonstrate ADL Tasks, 2-Verbalize Understanding

, 3-ImproveStrength/Cha


1=Demonstrate adherence to instructed precautions during ADL tasks.


2=Patient will verbalize/demonstrate understanding of assistive devices/

modifications for ADL.


3=Patient will improve strength/tolerance for activity to enable patient to 

perform ADL's.





OT Long Term Goals


Long Term Goals


Time Frame:  May 30, 2017


Eating (FIM):  6


Eating (QC):  6


Groomin


Bathing(FIM):  5


Shower/Bathe Self (QC):  4


Upper Body Dressing(FIM):  5


Upper Body Dressing (QC):  4


Lower Body Dressing(FIM):  5


Lower Body Dressing (QC):  4


On/Off Footwear (QC):  5


Toileting(FIM):  5


Toileting Hygiene (QC):  4


Toilet/Commode Transfer(FIM):  5


Toilet/Commode Transfer (QC):  4


Shower Transfer(FIM):  5


Additional Goals:  1-Demonstrate ADL Tasks, 2-Verbalize Understanding, 3-

ImproveStrength/Cha


1=Demonstrate adherence to instructed precautions during ADL tasks.


2=Patient will verbalize/demonstrate understanding of assistive devices/

modifications for ADL.


3=Patient will improve strength/tolerance for activity to enable patient to 

perform ADL's.





OT Education/Plan


Problem List/Assessment


Pt to benefit from skilled OT intervention for ADL training, transfers, 

strengthening, and safety education to improve level of function and allow safe 

discharge plan.





Discharge Recommendations


Plan/Recommendations:  Continue POC





Treatment Plan/Plan of Care


Patient would benefit from OT for education, treatment and training to promote 

independence in ADL's, mobility, safety and/or upper extremity function for ADL'

s.


Plan of Care:  ADL Retraining, Functional Mobility, Group Exercise/Act as Ind, 

UE Funct Exercise/Act, UE Neuromus Re-Ed/Coord


Treatment Duration:  May 30, 2017


Visits Per Week:  10-12


Minutes/Day (M-F):  60-90


Minutes/Day (Sat/[place]):  PRN


Agreement:  Yes


Rehab Potential:  Fair





Time/GCodes


Start Time:  10:00


Stop Time:  11:10


Total Time Billed (hr/min):  70


Billed Treatment Time


1 visit, ADLx5(70minutes)











FAUSTINO PATE OT May 12, 2017 11:48

## 2017-05-12 NOTE — PHYSICAL THERAPY DAILY NOTE
PT Daily Note-Current


Subjective


Pt. up in recliner for coffee, very talkative as usual. Agrees to Rx.





Pain





   Numeric Pain Scale:  0-No Pain





Transfers


              Functional [place] Measure


0=Not Assessed/NA   4=Minimal Assistance


1=Total Assistance   5=Supervision or Setup


2=Maximal Assistance   6=Modified [place]


3=Moderate Assistance   7=Complete IndependenceIRFPAI Quality Coding Scale











6 Independent with activity with or without an assistive device


 


5  Patient requires set up or clean up by helper.  Patient completes activity  

by  themselves


 


4 Supervision or touching assist (CGA). [place] provide cues , steadying assist


 


3 The helper provides less than half the effort to complete the activity


 


2 The helper provides more than half the effort to complete the activity


 


1 Dependent.  The helper does all the effort to complete an activity 


 


7 Patient refused to complete or attempt activity


 


9 The patient did not perform the activity before the current illness or injury


 


88 Not attempted due to Medical conditions or safety concerns








Transfers (B, C, W/C) (FIM):  5


Scootin


Rollin


Supine to/from Sit:  5


Sit to/from Stand:  5


Pt. able to sit to stand from lower and higher surfaces and toilet, slower but 

SBA up from flat bed sup to sit





Gait Training


Does the Patient Walk?:  Yes


Gait (FIM):  5


Distance (FIM):  3=518-15 ft (75x3)


Gait Level of Assist:  5


Gait Persons Needed:  1


Gait Assistive Device:  FWW


household exception, gait slow but no antonio LOB





Exercises


Supine Ex:  Ankle pumps, Rolling, Heel Slides, Short Arc Quads, Scooting, Hip 

abd/add


Supine Reps:  12


Seated Therapy Exercises:  Ankle pumps, Sit to stand, Long arc quads


Seated Reps:  12





Treatments


toileted SBA managing cleaning etc, no incont this date





Assessment


Current Status:  Excellent Progress


much improved gait and funct mob





PT Short Term Goals


Short Term Goals


Time Frame:  May 23, 2017


Transfers (B,C,W/C) (FIM):  3


Gait (FIM):  1


Distance (FIM):  1=up to 49 ft


Gait Distance Comment:  20'


Gait Level of Assist:  3


Gait Assistive Device:  FWW


Stairs (FIM):  1


# of Steps:  1


Stairs Level of Assist:  3





PT Long Term Goals


Long Term Goals


PT Long Term Goals Time Frame:  2017


Transfers (B,C,W/C) (FIM):  5


Sit to Lying (QC):  5


Lying-Sitting on Side/Bed(QC):  5


Sit to Stand (QC):  5


Rollin


Roll Left to Right (QC):  5


Chair/Bed-to-Chair Xfer(QC):  5


Car Transfer (QC):  5


Does the Patient Walk:  No and Walking Goal IS indicated


Gait (FIM):  2


Gait distance (FIM):  9=686-30 ft


Distance:  75'


Walk 10 feet (QC):  5


Walk 10ft-Uneven Surface(QC):  5


Walk 50ft with 2 Turns (QC):  5


Walk 150 ft (QC):  88


Gait Level of Assist:  5


Gait Assistive Device:  FWW


# of Steps:  4


1 Step (curb) (QC):  4


4 Steps (QC):  4


12 Steps (QC):  88


Stairs Level Of Assist:  5


Picking up an Object (QC):  4





PT Plan


Treatment/Plan


Treatment Plan:  Continue Plan of Care


Treatment Plan:  Bed Mobility, Education, Functional Activity Cha, Group 

Therapy, Gait, Safety, Therapeutic Exercise, Transfers


Treatment Duration:  2017


Visits Per Week:  6-11


Minutes/Day (M-F):  60-90


Minutes/Day (Sat/[place]):  PRN





Safety Risks/Education


Patient Education:  Gait Training, Transfer Techniques, Correct Positioning, 

Safety Issues


Teaching Recipient:  Patient


Teaching Methods:  Demonstration, Discussion


Response to Teaching:  Verbalize Understanding, Return Demonstration, 

Reinforcement Needed





Time/GCodes


Time In:  755


Time Out:  855


Total Billed Treatment Time:  60


Total Billed Treatment


1,GT20m,FA25m,EX15m


G Codes Necessary:  LISA Fontenot PTA May 12, 2017 08:58

## 2017-05-12 NOTE — PROGRESS NOTE (SOAP)
Subjective


Subjective/Events-last exam


general debility.


Diabetes.


Patient feeling better and improving





Objective


Exam





Vital Signs








  Date Time  Temp Pulse Resp B/P (MAP) Pulse Ox O2 Delivery O2 Flow Rate FiO2


 


17 05:00 98.0 73 20 145/77 100   


 


17 19:05 98.1 69 16 112/66 98   


 


17 09:12  73  125/67    














I & O 


 


 17





 07:00


 


Intake Total 800 ml


 


Output Total 1200 ml


 


Balance -400 ml





Capillary Refill : Less Than 3 Seconds


General Appearance:  No Apparent Distress, WD/WN


HEENT:  Normal ENT Inspection


Neck:  Full Range of Motion, Normal Inspection


Respiratory:  Chest Non Tender, Lungs Clear, Normal Breath Sounds, No Accessory 

Muscle Use, No Respiratory Distress


Cardiovascular:  Regular Rate, Rhythm





Results


Lab


Laboratory Tests


17 09:00: 


Sodium Level 137, Potassium Level 5.3H, Chloride Level 103, Carbon Dioxide 

Level 21, Anion Gap 13, Blood Urea Nitrogen 18, Creatinine 0.83, Estimat 

Glomerular Filtration Rate > 60, BUN/Creatinine Ratio 22, Glucose Level 136H, 

Calcium Level 9.7


17 09:26: 


White Blood Count 7.9, Red Blood Count 4.45, Hemoglobin 11.8, Hematocrit 37, 

Mean Corpuscular Volume 82, Mean Corpuscular Hemoglobin 27, Mean Corpuscular 

Hemoglobin Concent 32, Red Cell Distribution Width 14.6H, Platelet Count 304, 

Mean Platelet Volume 10.3


17 11:06: Glucometer 148H


17 16:03: Glucometer 111H


17 20:36: Glucometer 137H


17 05:21: Glucometer 127H





Microbiology


5/10/17 Urine Culture - Preliminary, Resulted


          NO GROWTH





Assessment/Plan


Assessment/Plan


Assess & Plan/Chief Complaint


general debility.


Foot ulcers good.


Diabetes 


2 person assist.


.


17.


General debility.


Diabetes.


Patient improving





Clinical Quality Measures


DVT/VTE Risk/Contraindication:


Risk Factor Score Per Nursin


RFS Level Per Nursing on Admit:  4+=Very High











LELAND DACOSTA DO May 12, 2017 08:09

## 2017-05-12 NOTE — THERAPY GROUP DAILY NOTE
Therapy Daily Group Note


Exercises


Fine Motor, UE Exercise





Other/Notes


Pt ambulated to OT/PT lunch group with CGA using FWW.  Lunch group consisted of 

socialization, recalling local history per patients, fine motor and UE skills 

for eating lunch.  Pt was able to answer questions about own family history and 

interact with other patients. Pt contributed to discussions appropriately and 

was able to talk easily with other pt's.   Pt was able to open containers/

packages by self then use regular utensils to feed self. After group, pt lying 

in bed with call light/phone in reach.  All needs met in room.


Start Time:  12:00


Stop Time:  13:15


Total Billed Treatment Time:  75


Total Billed Treatment


1-GRP











SCHUYLER BUSTAMANTE May 12, 2017 14:14

## 2017-05-12 NOTE — INDIVIDUALIZED PLAN OF CARE
Individualized Plan of Care


Rehab Nursing IPOC Order


Admission Date


May 9, 2017 at 15:21


Current Orders





Orders


Patient Visit (5/11/17 )


Exercise Therap, Ea 15 Min (5/11/17 )


Functional Activities, Ea 15 (5/11/17 )


Gait Training, Ea 15 Min (5/11/17 )


Therapeutic, Group (5/11/17 )


Toilet every (bladder): (hrs):  2 hours while awake prn





PT IPOC


Problem List:  Activity Tolerance, Functional Strength, Safety, Balance, Gait, 

Transfer, Bed Mobility


Treatment Plan:  Continue Plan of Care


Bed Mobility, Education, Functional Activity Cha, Group Therapy, Gait, Safety

, Therapeutic Exercise, Transfers


Treatment Duration:  Jun 6, 2017


Visits Per Week:  6-11


Minutes/Day (M-F):  60-90


Minutes/Day (Sat/[place]):  PRN





OT IPOC


Problems:  Decreased Activ Tolerance, Decreased Safety Aware, Decreased UE 

Strength, Dependent Transfers, Impaired Coordination, Impaired Funct Balance, 

Impaired Self-Care Skills


OT Problems


Pt to benefit from skilled OT intervention for ADL training, transfers, 

strengthening, and safety education to improve level of function and allow safe 

discharge plan.


Plan of Care:  ADL Retraining, Functional Mobility, Group Exercise/Act as Ind, 

UE Funct Exercise/Act, UE Neuromus Re-Ed/Coord


Treatment Duration:  May 30, 2017


Visits Per Week:  10-12


Minutes/Day (M-F):  60-90


Minutes/Day (Sat/[place]):  PRN





ST IPOC


Speech Therapy Treatment Plan:  Discontinue ST





Physician IPOC


Medical Issues being managed closely and that require the 24 hour availability 

of a physician:DM Obesity recurrent dermatitis involving the legs DM recent 

bout of cellulitis


Medical Issues:  DVT Prophylaxis, Falls Precautions, Fluid/Electrolyte/

Nutrition Balance, Infection Protection, Pain Management, Wound Care, Other (

List) (as per above)


Brief Synthesis of Preadmission Screen, Post-Admission Evaluation, and Therapy 


Evaluations: 66 yo female with general debil s/p recent bout of cellulitis 

treated during recent priot admission to [facility] Had been 

Independent prior to this and was followed by wound clinic of dermatitis and 

venous ulcers of the legs PCP DR Sullivan and Wound care specialist DR Wise 

[facility] also significant for DM


Medical Prognosis:  good


Anticipated Length of Stay:  6/6/17


Rehab Goals


Modified Independent for adls and mobility skills


Anticipated discharge destinat:  Home with [facility]











ZHANG RODAS MD May 12, 2017 12:08

## 2017-05-12 NOTE — PM & R (SOAP) PROGRESS NOTE
Subjective


Subjective/Events-last exam


Patient was seen in her room this AM,Appreciate Therapy notes as well as DR Dhillon notes and orders Current labs reviewed Appreciate Dr Jordan 

note.Patient SBA for transfers





Objective


Exam


Last Set of Vital Signs





Vital Signs








  Date Time  Temp Pulse Resp B/P (MAP) Pulse Ox O2 Delivery O2 Flow Rate FiO2


 


5/12/17 05:00 98.0 73 20 145/77 100   


 


5/11/17 05:00      Room Air  





Capillary Refill : Less Than 3 Seconds


I&O











Intake and Output 


 


 5/12/17





 00:00


 


Intake Total 600 ml


 


Output Total 1200 ml


 


Balance -600 ml


 


 


 


Intake Oral 600 ml


 


Output Urine Total 1200 ml








General:  Alert, Oriented X3, Cooperative, No Acute Distress


HEENT:  Atraumatic, PERRLA, EOMI, Mucous Memb Moist/Pink


Neck:  Supple, No JVD


Lungs:  Clear to Auscultation, Normal Air Movement


Heart:  Regular Rate


Abdomen:  Normal Bowel Sounds, Soft, No Tenderness


Extremities:  Other (chronic dermatitis and edema)


Skin:  Other (Bilateral calf dermatitis with markedly reduced edema. Bilateral 

foot ulcers with scab formation.)


Neuro:  Other (generalized weakness)





Results


Lab


Laboratory Tests


5/9/17 18:42: Hemoglobin A1c 7.1H


5/9/17 20:37: Glucometer 160H


5/10/17 04:49: Glucometer 125H


5/10/17 05:32: 


Sodium Level 137, Potassium Level 4.2, Chloride Level 103, Carbon Dioxide Level 

25, Anion Gap 9, Blood Urea Nitrogen 22H, Creatinine 0.97, Estimat Glomerular 

Filtration Rate 57, BUN/Creatinine Ratio 23, Glucose Level 114H, Calcium Level 

9.2


5/10/17 09:35: 


Urine Color YELLOW, Urine Clarity CLEAR, Urine pH 6, Urine Specific Gravity 

1.015L, Urine Protein 1+H, Urine Glucose (UA) NEGATIVE, Urine Ketones NEGATIVE, 

Urine Nitrite NEGATIVE, Urine Bilirubin 3+H, Urine Urobilinogen NORMAL, Urine 

Leukocyte Esterase 3+H, Urine RBC (Auto) 2+H, Urine RBC 2-5H, Urine WBC 25-50H, 

Urine Squamous Epithelial Cells 10-25H, Urine Crystals NONE, Urine Bacteria FEWH

, Urine Casts NONE, Urine Mucus NEGATIVE, Urine Culture Indicated YES


5/10/17 10:49: Glucometer 159H


5/10/17 16:04: Glucometer 128H


5/10/17 21:09: Glucometer 152H


5/11/17 06:37: Glucometer 117H


5/11/17 09:00: 


Sodium Level 137, Potassium Level 5.3H, Chloride Level 103, Carbon Dioxide 

Level 21, Anion Gap 13, Blood Urea Nitrogen 18, Creatinine 0.83, Estimat 

Glomerular Filtration Rate > 60, BUN/Creatinine Ratio 22, Glucose Level 136H, 

Calcium Level 9.7


5/11/17 09:26: 


White Blood Count 7.9, Red Blood Count 4.45, Hemoglobin 11.8, Hematocrit 37, 

Mean Corpuscular Volume 82, Mean Corpuscular Hemoglobin 27, Mean Corpuscular 

Hemoglobin Concent 32, Red Cell Distribution Width 14.6H, Platelet Count 304, 

Mean Platelet Volume 10.3


5/11/17 11:06: Glucometer 148H


5/11/17 16:03: Glucometer 111H


5/11/17 20:36: Glucometer 137H


5/12/17 05:21: Glucometer 127H


5/12/17 10:56: Glucometer 125H





Microbiology


5/10/17 Urine Culture - Final, Complete


          NO GROWTH





Assessment/Plan


Assessment


General debil


DM


Chronic stasis dermatitis





Plan


Continue PT/OT


Skin care 


F/U with DR holder PCP PRN


Trend accuchecks and adjust meds as needed











ZHANG RODAS MD May 12, 2017 11:58

## 2017-05-13 VITALS — DIASTOLIC BLOOD PRESSURE: 67 MMHG | SYSTOLIC BLOOD PRESSURE: 164 MMHG

## 2017-05-13 VITALS — SYSTOLIC BLOOD PRESSURE: 134 MMHG | DIASTOLIC BLOOD PRESSURE: 78 MMHG

## 2017-05-13 RX ADMIN — INSULIN ASPART SCH UNIT: 100 INJECTION, SOLUTION INTRAVENOUS; SUBCUTANEOUS at 12:06

## 2017-05-13 RX ADMIN — DULOXETINE HYDROCHLORIDE SCH MG: 30 CAPSULE, DELAYED RELEASE ORAL at 08:12

## 2017-05-13 RX ADMIN — NYSTATIN SCH GM: 100000 CREAM TOPICAL at 15:24

## 2017-05-13 RX ADMIN — INSULIN ASPART SCH UNIT: 100 INJECTION, SOLUTION INTRAVENOUS; SUBCUTANEOUS at 16:52

## 2017-05-13 RX ADMIN — PREGABALIN SCH MG: 75 CAPSULE ORAL at 08:13

## 2017-05-13 RX ADMIN — FUROSEMIDE SCH MG: 40 TABLET ORAL at 08:12

## 2017-05-13 RX ADMIN — Medication SCH MG: at 20:56

## 2017-05-13 RX ADMIN — PREGABALIN SCH MG: 75 CAPSULE ORAL at 15:24

## 2017-05-13 RX ADMIN — PREGABALIN SCH MG: 75 CAPSULE ORAL at 20:56

## 2017-05-13 RX ADMIN — TROSPIUM CHLORIDE SCH MG: 20 TABLET, FILM COATED ORAL at 06:09

## 2017-05-13 RX ADMIN — NYSTATIN SCH GM: 100000 CREAM TOPICAL at 12:06

## 2017-05-13 RX ADMIN — CLINDAMYCIN HYDROCHLORIDE SCH MG: 150 CAPSULE ORAL at 12:06

## 2017-05-13 RX ADMIN — ROPINIROLE SCH MG: 1 TABLET ORAL at 08:11

## 2017-05-13 RX ADMIN — PANTOPRAZOLE SODIUM SCH MG: 40 TABLET, DELAYED RELEASE ORAL at 06:09

## 2017-05-13 RX ADMIN — TROSPIUM CHLORIDE SCH MG: 20 TABLET, FILM COATED ORAL at 15:34

## 2017-05-13 RX ADMIN — NYSTATIN SCH GM: 100000 CREAM TOPICAL at 20:56

## 2017-05-13 RX ADMIN — ENOXAPARIN SODIUM SCH MG: 100 INJECTION SUBCUTANEOUS at 08:12

## 2017-05-13 RX ADMIN — INSULIN ASPART SCH UNIT: 100 INJECTION, SOLUTION INTRAVENOUS; SUBCUTANEOUS at 20:52

## 2017-05-13 RX ADMIN — CLINDAMYCIN HYDROCHLORIDE SCH MG: 150 CAPSULE ORAL at 00:32

## 2017-05-13 RX ADMIN — ROPINIROLE SCH MG: 1 TABLET ORAL at 20:56

## 2017-05-13 RX ADMIN — CLINDAMYCIN HYDROCHLORIDE SCH MG: 150 CAPSULE ORAL at 06:09

## 2017-05-13 RX ADMIN — METOPROLOL SUCCINATE SCH MG: 50 TABLET, EXTENDED RELEASE ORAL at 08:12

## 2017-05-13 RX ADMIN — METFORMIN HYDROCHLORIDE SCH MG: 500 TABLET, FILM COATED ORAL at 06:09

## 2017-05-13 RX ADMIN — LORATADINE SCH MG: 10 TABLET ORAL at 08:12

## 2017-05-13 RX ADMIN — INSULIN ASPART SCH UNIT: 100 INJECTION, SOLUTION INTRAVENOUS; SUBCUTANEOUS at 05:11

## 2017-05-13 RX ADMIN — Medication SCH MG: at 08:12

## 2017-05-13 RX ADMIN — CLINDAMYCIN HYDROCHLORIDE SCH MG: 150 CAPSULE ORAL at 18:34

## 2017-05-13 RX ADMIN — METFORMIN HYDROCHLORIDE SCH MG: 500 TABLET, FILM COATED ORAL at 18:34

## 2017-05-13 RX ADMIN — ATORVASTATIN CALCIUM SCH MG: 20 TABLET, FILM COATED ORAL at 20:56

## 2017-05-13 NOTE — DIAGNOSTIC IMAGING REPORT
Clinical indication: Patient with some left hand weakness.



Exam: Axial CT scan of the brain performed without IV contrast.



Comparison: Head CT without IV contrast dated 5/20/2017.



Findings:

There is no evidence of acute cerebral infarct, intracranial

hemorrhage, or gross mass effect. 



There is patchy and focal areas of low-attenuation white matter

changes seen throughout both cerebral hemispheres which is not

significantly changed compared to prior study, likely

representing chronic small vessel ischemic disease. There is

normal gray-white matter distinction. The brain parenchymal

volume appears appropriate for patient's age. There is no

significant midline shift or herniation. 



 There is no evidence of hydrocephalus. The basal cisterns are

unremarkable. The skull, extracranial soft tissue, and orbits are

unremarkable. The paranasal sinuses are unremarkable.



Impression:

1. Stable CT scan of the brain with no evidence of interval acute

intracranial process.



2: Brain parenchymal chronic small vessel ischemic disease which

is not significantly changed.



Dictated by: 



  Dictated on workstation # [id number]

## 2017-05-13 NOTE — PHYSICAL THERAPY DAILY NOTE
PT Daily Note-Current


Subjective


Pt sitting in recliner upon arrival.  Pt is very talkative and shares life 

stories during tx.  Pt agrees to PT.





Pain





   Numeric Pain Scale:  8


   Location:  Lower


   Location Body Site:  Back


   Pain Description:  Sharp





Mental Status


Patient Orientation:  Person, Place, Situation





Transfers


              Functional [place] Measure


0=Not Assessed/NA   4=Minimal Assistance


1=Total Assistance   5=Supervision or Setup


2=Maximal Assistance   6=Modified [place]


3=Moderate Assistance   7=Complete IndependenceIRFPAI Quality Coding Scale











6 Independent with activity with or without an assistive device


 


5  Patient requires set up or clean up by helper.  Patient completes activity  

by  themselves


 


4 Supervision or touching assist (CGA). [place] provide cues , steadying assist


 


3 The helper provides less than half the effort to complete the activity


 


2 The helper provides more than half the effort to complete the activity


 


1 Dependent.  The helper does all the effort to complete an activity 


 


7 Patient refused to complete or attempt activity


 


9 The patient did not perform the activity before the current illness or injury


 


88 Not attempted due to Medical conditions or safety concerns








Scootin


Sit to/from Stand:  5


Sit to Stand (QC):  5





Weight Bearing


Weight Bearing Restriction:  Full Weight Bearing


Location Restriction:  LE Bilateral





Exercises


Seated Therapy Exercises:  Ankle pumps, Sit to stand (from recliner to stand at 

bed side w/FWW), Long arc quads, Hip flexion, Kicking activity


Seated Reps:  15





Treatments


Pt completed Seated Ex in recliner.  Pt then completed sit to stand from 

recliner to stand at bed side to make the bed.  Pt needed VC to stay on task.  

Pt gave pt ed. on benefits of Therapy and disease process pt has going on.  Pt 

is left in recliner with all needs met at end of tx.





Assessment


Current Status:  Good Progress


Pt is getting stronger and making improvements with activity tolerance and 

balance.





PT Short Term Goals


Short Term Goals


Time Frame:  May 23, 2017


Transfers (B,C,W/C) (FIM):  3


Gait (FIM):  1


Distance (FIM):  1=up to 49 ft


Gait Distance Comment:  20'


Gait Level of Assist:  3


Gait Assistive Device:  FWW


Stairs (FIM):  1


# of Steps:  1


Stairs Level of Assist:  3





PT Long Term Goals


Long Term Goals


PT Long Term Goals Time Frame:  2017


Transfers (B,C,W/C) (FIM):  5


Sit to Lying (QC):  5


Lying-Sitting on Side/Bed(QC):  5


Sit to Stand (QC):  5


Rollin


Roll Left to Right (QC):  5


Chair/Bed-to-Chair Xfer(QC):  5


Car Transfer (QC):  5


Does the Patient Walk:  No and Walking Goal IS indicated


Gait (FIM):  2


Gait distance (FIM):  1=489-00 ft


Distance:  75'


Walk 10 feet (QC):  5


Walk 10ft-Uneven Surface(QC):  5


Walk 50ft with 2 Turns (QC):  5


Walk 150 ft (QC):  88


Gait Level of Assist:  5


Gait Assistive Device:  FWW


# of Steps:  4


1 Step (curb) (QC):  4


4 Steps (QC):  4


12 Steps (QC):  88


Stairs Level Of Assist:  5


Picking up an Object (QC):  4





PT Plan


Problem List


Problem List:  Activity Tolerance, Safety, Balance, Gait, Transfer





Treatment/Plan


Treatment Plan:  Continue Plan of Care


Treatment Plan:  Bed Mobility, Education, Functional Activity Cha, Group 

Therapy, Gait, Safety, Therapeutic Exercise, Transfers


Treatment Duration:  2017


Visits Per Week:  6-11


Minutes/Day (M-F):  60-90


Minutes/Day (Sat/C[place]):  PRN





Safety Risks/Education


Patient Education:  Transfer Techniques, Correct Positioning, Disease Process, 

Safety Issues


Teaching Recipient:  Patient


Teaching Methods:  Discussion


Response to Teaching:  Verbalize Understanding





Time/GCodes


Time In:  730


Time Out:  805


Total Billed Treatment Time:  35


Total Billed Treatment


visit, EX (15m) & FA (20m)











RASHAAD BRAGG PTA May 13, 2017 08:58

## 2017-05-13 NOTE — PM & R (SOAP) PROGRESS NOTE
Subjective


Subjective/Events-last exam


Patient was seen inher room this AM  Patient SBA for transfers





Objective


Exam


Last Set of Vital Signs





Vital Signs








  Date Time  Temp Pulse Resp B/P (MAP) Pulse Ox O2 Delivery O2 Flow Rate FiO2


 


5/13/17 18:40 96.6 65 18 134/78 96   


 


5/11/17 05:00      Room Air  





Capillary Refill : Less Than 3 Seconds


I&O











Intake and Output 


 


 5/13/17





 00:00


 


Intake Total 900 ml


 


Balance 900 ml


 


 


 


Intake Oral 900 ml


 


# Voids 5








General:  Alert, Oriented X3, Cooperative, No Acute Distress


HEENT:  Atraumatic, PERRLA, EOMI, Mucous Memb Moist/Pink


Neck:  Supple, No JVD


Lungs:  Clear to Auscultation, Normal Air Movement


Heart:  Regular Rate


Abdomen:  Normal Bowel Sounds, Soft, No Tenderness


Extremities:  Other (chronic dermatitis and edema)


Skin:  Other (Bilateral calf dermatitis with markedly reduced edema. Bilateral 

foot ulcers with scab formation.)


Neuro:  Other (generalized weakness)





Results


Lab


Laboratory Tests


5/11/17 06:37: Glucometer 117H


5/11/17 09:00: 


Sodium Level 137, Potassium Level 5.3H, Chloride Level 103, Carbon Dioxide 

Level 21, Anion Gap 13, Blood Urea Nitrogen 18, Creatinine 0.83, Estimat 

Glomerular Filtration Rate > 60, BUN/Creatinine Ratio 22, Glucose Level 136H, 

Calcium Level 9.7


5/11/17 09:26: 


White Blood Count 7.9, Red Blood Count 4.45, Hemoglobin 11.8, Hematocrit 37, 

Mean Corpuscular Volume 82, Mean Corpuscular Hemoglobin 27, Mean Corpuscular 

Hemoglobin Concent 32, Red Cell Distribution Width 14.6H, Platelet Count 304, 

Mean Platelet Volume 10.3


5/11/17 11:06: Glucometer 148H


5/11/17 16:03: Glucometer 111H


5/11/17 20:36: Glucometer 137H


5/12/17 05:21: Glucometer 127H


5/12/17 10:56: Glucometer 125H


5/12/17 15:40: Glucometer 136H


5/12/17 20:42: Glucometer 142H


5/13/17 04:38: Glucometer 117H


5/13/17 11:03: Glucometer 135H


5/13/17 16:08: Glucometer 106


5/13/17 20:04: Glucometer 146H





Microbiology


5/10/17 Urine Culture - Final, Complete


          NO GROWTH





Assessment/Plan


Assessment


General debil


DM


Chronic stasis dermatitis





Plan


Continue PT/OT


Skin care 


F/U with DR holder PCP PRN


Trend accuchecks and adjust meds as needed


Team Conference this following week











ZHANG RODAS MD May 13, 2017 21:23

## 2017-05-14 VITALS — DIASTOLIC BLOOD PRESSURE: 64 MMHG | SYSTOLIC BLOOD PRESSURE: 111 MMHG

## 2017-05-14 VITALS — DIASTOLIC BLOOD PRESSURE: 72 MMHG | SYSTOLIC BLOOD PRESSURE: 111 MMHG

## 2017-05-14 RX ADMIN — CLINDAMYCIN HYDROCHLORIDE SCH MG: 150 CAPSULE ORAL at 06:25

## 2017-05-14 RX ADMIN — PREGABALIN SCH MG: 75 CAPSULE ORAL at 21:13

## 2017-05-14 RX ADMIN — CLINDAMYCIN HYDROCHLORIDE SCH MG: 150 CAPSULE ORAL at 17:38

## 2017-05-14 RX ADMIN — ENOXAPARIN SODIUM SCH MG: 100 INJECTION SUBCUTANEOUS at 08:05

## 2017-05-14 RX ADMIN — INSULIN ASPART SCH UNIT: 100 INJECTION, SOLUTION INTRAVENOUS; SUBCUTANEOUS at 06:34

## 2017-05-14 RX ADMIN — TROSPIUM CHLORIDE SCH MG: 20 TABLET, FILM COATED ORAL at 17:37

## 2017-05-14 RX ADMIN — INSULIN ASPART SCH UNIT: 100 INJECTION, SOLUTION INTRAVENOUS; SUBCUTANEOUS at 21:39

## 2017-05-14 RX ADMIN — INSULIN ASPART SCH UNIT: 100 INJECTION, SOLUTION INTRAVENOUS; SUBCUTANEOUS at 17:38

## 2017-05-14 RX ADMIN — ROPINIROLE SCH MG: 1 TABLET ORAL at 08:04

## 2017-05-14 RX ADMIN — METOPROLOL SUCCINATE SCH MG: 50 TABLET, EXTENDED RELEASE ORAL at 08:03

## 2017-05-14 RX ADMIN — NYSTATIN SCH GM: 100000 CREAM TOPICAL at 21:15

## 2017-05-14 RX ADMIN — Medication SCH MG: at 21:13

## 2017-05-14 RX ADMIN — CLINDAMYCIN HYDROCHLORIDE SCH MG: 150 CAPSULE ORAL at 00:20

## 2017-05-14 RX ADMIN — METFORMIN HYDROCHLORIDE SCH MG: 500 TABLET, FILM COATED ORAL at 06:24

## 2017-05-14 RX ADMIN — METFORMIN HYDROCHLORIDE SCH MG: 500 TABLET, FILM COATED ORAL at 17:37

## 2017-05-14 RX ADMIN — NYSTATIN SCH GM: 100000 CREAM TOPICAL at 13:22

## 2017-05-14 RX ADMIN — PREGABALIN SCH MG: 75 CAPSULE ORAL at 12:53

## 2017-05-14 RX ADMIN — ATORVASTATIN CALCIUM SCH MG: 20 TABLET, FILM COATED ORAL at 21:13

## 2017-05-14 RX ADMIN — TROSPIUM CHLORIDE SCH MG: 20 TABLET, FILM COATED ORAL at 06:25

## 2017-05-14 RX ADMIN — LORATADINE SCH MG: 10 TABLET ORAL at 08:03

## 2017-05-14 RX ADMIN — PREGABALIN SCH MG: 75 CAPSULE ORAL at 08:04

## 2017-05-14 RX ADMIN — Medication SCH MG: at 08:03

## 2017-05-14 RX ADMIN — INSULIN ASPART SCH UNIT: 100 INJECTION, SOLUTION INTRAVENOUS; SUBCUTANEOUS at 12:54

## 2017-05-14 RX ADMIN — CLINDAMYCIN HYDROCHLORIDE SCH MG: 150 CAPSULE ORAL at 12:53

## 2017-05-14 RX ADMIN — PANTOPRAZOLE SODIUM SCH MG: 40 TABLET, DELAYED RELEASE ORAL at 06:25

## 2017-05-14 RX ADMIN — DULOXETINE HYDROCHLORIDE SCH MG: 30 CAPSULE, DELAYED RELEASE ORAL at 08:03

## 2017-05-14 RX ADMIN — ROPINIROLE SCH MG: 1 TABLET ORAL at 21:13

## 2017-05-14 RX ADMIN — FUROSEMIDE SCH MG: 40 TABLET ORAL at 08:04

## 2017-05-14 RX ADMIN — NYSTATIN SCH GM: 100000 CREAM TOPICAL at 09:00

## 2017-05-15 VITALS — SYSTOLIC BLOOD PRESSURE: 124 MMHG | DIASTOLIC BLOOD PRESSURE: 60 MMHG

## 2017-05-15 VITALS — SYSTOLIC BLOOD PRESSURE: 139 MMHG | DIASTOLIC BLOOD PRESSURE: 9 MMHG

## 2017-05-15 RX ADMIN — INSULIN ASPART SCH UNIT: 100 INJECTION, SOLUTION INTRAVENOUS; SUBCUTANEOUS at 16:17

## 2017-05-15 RX ADMIN — PREGABALIN SCH MG: 75 CAPSULE ORAL at 21:22

## 2017-05-15 RX ADMIN — Medication SCH MG: at 09:19

## 2017-05-15 RX ADMIN — NYSTATIN SCH GM: 100000 CREAM TOPICAL at 12:13

## 2017-05-15 RX ADMIN — ROPINIROLE SCH MG: 1 TABLET ORAL at 21:26

## 2017-05-15 RX ADMIN — FUROSEMIDE SCH MG: 40 TABLET ORAL at 09:19

## 2017-05-15 RX ADMIN — PREGABALIN SCH MG: 75 CAPSULE ORAL at 09:19

## 2017-05-15 RX ADMIN — DULOXETINE HYDROCHLORIDE SCH MG: 30 CAPSULE, DELAYED RELEASE ORAL at 09:19

## 2017-05-15 RX ADMIN — NYSTATIN SCH GM: 100000 CREAM TOPICAL at 09:27

## 2017-05-15 RX ADMIN — ENOXAPARIN SODIUM SCH MG: 100 INJECTION SUBCUTANEOUS at 09:19

## 2017-05-15 RX ADMIN — TROSPIUM CHLORIDE SCH MG: 20 TABLET, FILM COATED ORAL at 16:37

## 2017-05-15 RX ADMIN — PANTOPRAZOLE SODIUM SCH MG: 40 TABLET, DELAYED RELEASE ORAL at 06:19

## 2017-05-15 RX ADMIN — PREGABALIN SCH MG: 75 CAPSULE ORAL at 12:13

## 2017-05-15 RX ADMIN — LORATADINE SCH MG: 10 TABLET ORAL at 09:19

## 2017-05-15 RX ADMIN — METFORMIN HYDROCHLORIDE SCH MG: 500 TABLET, FILM COATED ORAL at 16:36

## 2017-05-15 RX ADMIN — CLINDAMYCIN HYDROCHLORIDE SCH MG: 150 CAPSULE ORAL at 12:13

## 2017-05-15 RX ADMIN — NYSTATIN SCH GM: 100000 CREAM TOPICAL at 21:23

## 2017-05-15 RX ADMIN — INSULIN ASPART SCH UNIT: 100 INJECTION, SOLUTION INTRAVENOUS; SUBCUTANEOUS at 11:36

## 2017-05-15 RX ADMIN — TROSPIUM CHLORIDE SCH MG: 20 TABLET, FILM COATED ORAL at 06:20

## 2017-05-15 RX ADMIN — ROPINIROLE SCH MG: 1 TABLET ORAL at 09:19

## 2017-05-15 RX ADMIN — METOPROLOL SUCCINATE SCH MG: 50 TABLET, EXTENDED RELEASE ORAL at 09:19

## 2017-05-15 RX ADMIN — METFORMIN HYDROCHLORIDE SCH MG: 500 TABLET, FILM COATED ORAL at 06:19

## 2017-05-15 RX ADMIN — ATORVASTATIN CALCIUM SCH MG: 20 TABLET, FILM COATED ORAL at 21:22

## 2017-05-15 RX ADMIN — CLINDAMYCIN HYDROCHLORIDE SCH MG: 150 CAPSULE ORAL at 16:36

## 2017-05-15 RX ADMIN — Medication SCH MG: at 21:22

## 2017-05-15 RX ADMIN — CLINDAMYCIN HYDROCHLORIDE SCH MG: 150 CAPSULE ORAL at 01:13

## 2017-05-15 RX ADMIN — CLINDAMYCIN HYDROCHLORIDE SCH MG: 150 CAPSULE ORAL at 06:20

## 2017-05-15 RX ADMIN — INSULIN ASPART SCH UNIT: 100 INJECTION, SOLUTION INTRAVENOUS; SUBCUTANEOUS at 21:22

## 2017-05-15 RX ADMIN — INSULIN ASPART SCH UNIT: 100 INJECTION, SOLUTION INTRAVENOUS; SUBCUTANEOUS at 06:25

## 2017-05-15 NOTE — PM & R (SOAP) PROGRESS NOTE
Subjective


Subjective/Events-last exam


Patient was seen in her room this evening Patient SBA for transfers and gait 

Appreciate Dr Gonzalez note and recs re antipsychotic for patient Dr Holder is 

PCP and ordered the consult for Behav health-Defer to him for RX consider 

Zyprexia





Objective


Exam


Last Set of Vital Signs





Vital Signs








  Date Time  Temp Pulse Resp B/P (MAP) Pulse Ox O2 Delivery O2 Flow Rate FiO2


 


5/15/17 18:40 98.4 72 18 124/60 100   


 


5/11/17 05:00      Room Air  





Capillary Refill : Less Than 3 Seconds


I&O











Intake and Output 


 


 5/14/17





 23:59


 


Intake Total 1500 ml


 


Balance 1500 ml


 


 


 


Intake Oral 1500 ml


 


# Voids 8


 


# Bowel Movements 1








General:  Alert, Oriented X3, Cooperative, No Acute Distress


HEENT:  Atraumatic, PERRLA, EOMI, Mucous Memb Moist/Pink


Neck:  Supple, No JVD


Lungs:  Clear to Auscultation, Normal Air Movement


Heart:  Regular Rate


Abdomen:  Normal Bowel Sounds, Soft, No Tenderness


Extremities:  Other (chronic dermatitis and edema)


Skin:  Other (Bilateral calf dermatitis with markedly reduced edema. Bilateral 

foot ulcers with scab formation.)


Neuro:  Other (generalized weakness)





Results


Lab


Laboratory Tests


5/12/17 20:42: Glucometer 142H


5/13/17 04:38: Glucometer 117H


5/13/17 11:03: Glucometer 135H


5/13/17 16:08: Glucometer 106


5/13/17 20:04: Glucometer 146H


5/14/17 06:26: Glucometer 101


5/14/17 11:10: Glucometer 120H


5/14/17 15:55: Glucometer 106


5/14/17 21:16: Glucometer 108


5/15/17 06:15: Glucometer 105


5/15/17 11:09: Glucometer 133H


5/15/17 16:09: Glucometer 92





Microbiology


5/10/17 Urine Culture - Final, Complete


          NO GROWTH





Assessment/Plan


Assessment


General debil


DM


Chronic stasis dermatitis


Anxiety/depression/psychosis?





Plan


Continue PT/OT


Skin care 


F/U with DR holder PCP re antipsychotic-will discuss case with him in AM


Trend accuchecks and adjust meds as needed


Team Conference 5/17/17











ZHANG RODAS MD May 15, 2017 20:00

## 2017-05-15 NOTE — PROGRESS NOTE (SOAP)
Subjective


Subjective/Events-last exam


general debility.


Patient had confusion this weekend.


patient complaining of left hand.





Objective


Exam





Vital Signs








  Date Time  Temp Pulse Resp B/P (MAP) Pulse Ox O2 Delivery O2 Flow Rate FiO2


 


5/15/17 06:39 97.3 84 6 139/9 99   


 


17 18:59 98.0 70 14 111/72 98   














I & O 


 


 5/15/17





 07:00


 


Intake Total 1780 ml


 


Balance 1780 ml





Capillary Refill : Less Than 3 Seconds


General Appearance:  No Apparent Distress, WD/WN


HEENT:  Normal ENT Inspection


Neck:  Normal Inspection


Respiratory:  No Accessory Muscle Use, No Respiratory Distress





Results


Lab


Laboratory Tests


17 11:10: Glucometer 120H


17 15:55: Glucometer 106


17 21:16: Glucometer 108


5/15/17 06:15: Glucometer 105





Microbiology


5/10/17 Urine Culture - Final, Complete


          NO GROWTH





Assessment/Plan


Assessment/Plan


Assess & Plan/Chief Complaint


general debility.


Foot ulcers good.


Diabetes 


2 person assist.


.


17.


General debility.


Diabetes.


Patient improving.


.


55/15/70.


General debility.


Confusion


Diabetes.





Clinical Quality Measures


DVT/VTE Risk/Contraindication:


Risk Factor Score Per Nursin


RFS Level Per Nursing on Admit:  4+=Very High











LELAND DACOSTA DO May 15, 2017 08:20

## 2017-05-15 NOTE — PHYSICAL THERAPY DAILY NOTE
PT Daily Note-Current


Subjective


Pt. states she has one small step inside her house that she will never use. 

Goes t her laundry area and her sister does all her laundry. Pt. declines stair 

trial x3 attempts.  Pt. states [employer] installed a ramp outside her 

home and she has no trouble with that.  Pt. states she is going home at [place] and 

no one can stop her.  Pt. c/o that her left hand is weak and not working well 

today. This was observed but did not stop pt. from managing ADLs today





Pain





   Numeric Pain Scale:  3


   Location:  Lower


   Location Body Site:  Back


   Pain Description:  Ache


   Comment:  off and on





Appearance


up in bathroom and indep with underwear and pad and cleaning self, as well as 

washing hands





Mental Status


pt. will talk non stop if not redirected. Cant pin point always however pt. 

seems confused at times, perhaps slow etc





Transfers


              Functional [place] Measure


0=Not Assessed/NA   4=Minimal Assistance


1=Total Assistance   5=Supervision or Setup


2=Maximal Assistance   6=Modified [place]


3=Moderate Assistance   7=Complete IndependenceIRFPAI Quality Coding Scale











6 Independent with activity with or without an assistive device


 


5  Patient requires set up or clean up by helper.  Patient completes activity  

by  themselves


 


4 Supervision or touching assist (CGA). [place] provide cues , steadying assist


 


3 The helper provides less than half the effort to complete the activity


 


2 The helper provides more than half the effort to complete the activity


 


1 Dependent.  The helper does all the effort to complete an activity 


 


7 Patient refused to complete or attempt activity


 


9 The patient did not perform the activity before the current illness or injury


 


88 Not attempted due to Medical conditions or safety concerns








Transfers (B, C, W/C) (FIM):  5


Scootin


Rollin


Supine to/from Sit:  5 (this was the greatest challenge for pt. this date.  She 

states she mostly sleeps in recliner at home)


Sit to/from Stand:  6


Bed to/from Chair:  6





Gait Training


Does the Patient Walk?:  Yes


Gait (FIM):  6


Distance (FIM):  3=150 ft (175x2)


Gait Level of Assist:  6


Gait Persons Needed:  0


Gait Assistive Device:  FWW


slow, standing rest breaks at times as pt. c/o her back needs a rest.  Pt. also 

needs guided and directed as to where to turn and occas which is left and right





Stair Training


pt. adamantly declines stair training





Exercises


Supine Ex:  Rolling, Heel Slides, Straight leg raise, Hip abd/add


Supine Reps:  10





Treatments


toileting and morning bathroom tasks with SBA and indep





Assessment


Current Status:  Good Progress





PT Short Term Goals


Short Term Goals


Time Frame:  May 23, 2017


Transfers (B,C,W/C) (FIM):  3


Gait (FIM):  1


Distance (FIM):  1=up to 49 ft


Gait Distance Comment:  20'


Gait Level of Assist:  3


Gait Assistive Device:  FWW


Stairs (FIM):  1


# of Steps:  1


Stairs Level of Assist:  3





PT Long Term Goals


Long Term Goals


PT Long Term Goals Time Frame:  2017


Transfers (B,C,W/C) (FIM):  5


Sit to Lying (QC):  5


Lying-Sitting on Side/Bed(QC):  5


Sit to Stand (QC):  5


Rollin


Roll Left to Right (QC):  5


Chair/Bed-to-Chair Xfer(QC):  5


Car Transfer (QC):  5


Does the Patient Walk:  No and Walking Goal IS indicated


Gait (FIM):  2


Gait distance (FIM):  2=445-49 ft


Distance:  75'


Walk 10 feet (QC):  5


Walk 10ft-Uneven Surface(QC):  5


Walk 50ft with 2 Turns (QC):  5


Walk 150 ft (QC):  88


Gait Level of Assist:  5


Gait Assistive Device:  FWW


# of Steps:  4


1 Step (curb) (QC):  4


4 Steps (QC):  4


12 Steps (QC):  88


Stairs Level Of Assist:  5


Picking up an Object (QC):  4





PT Plan


Treatment/Plan


Treatment Plan:  Continue Plan of Care


Treatment Plan:  Bed Mobility, Education, Functional Activity Cha, Group 

Therapy, Gait, Safety, Therapeutic Exercise, Transfers


Treatment Duration:  2017


Visits Per Week:  6-11


Minutes/Day (M-F):  60-90


Minutes/Day (Sat/[place]all[place]):  PRN





Safety Risks/Education


Patient Education:  Gait Training, Transfer Techniques (much direction RE: sup 

to sit)


Teaching Recipient:  Patient


Teaching Methods:  Demonstration, Discussion


Response to Teaching:  Verbalize Understanding, Return Demonstration, 

Reinforcement Needed





Time/GCodes


Time In:  800


Time Out:  900


Total Billed Treatment Time:  60


Total Billed Treatment


1,FA30m,GT30m


G Codes Necessary:  LISA Fontenot PTA May 15, 2017 09:03
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
## 2017-05-15 NOTE — PHYSICAL THERAPY DAILY NOTE
PT Daily Note-Current


Subjective


Pt. states she has historically been afraid of stairs but now feels much more 

confident and is glad this PTA gave her the nudge to do them





Pain





   Numeric Pain Scale:  0-No Pain





Mental Status


Patient Orientation:  Confused





Transfers


              Functional [place] Measure


0=Not Assessed/NA   4=Minimal Assistance


1=Total Assistance   5=Supervision or Setup


2=Maximal Assistance   6=Modified [place]


3=Moderate Assistance   7=Complete IndependenceIRFPAI Quality Coding Scale











6 Independent with activity with or without an assistive device


 


5  Patient requires set up or clean up by helper.  Patient completes activity  

by  themselves


 


4 Supervision or touching assist (CGA). [place] provide cues , steadying assist


 


3 The helper provides less than half the effort to complete the activity


 


2 The helper provides more than half the effort to complete the activity


 


1 Dependent.  The helper does all the effort to complete an activity 


 


7 Patient refused to complete or attempt activity


 


9 The patient did not perform the activity before the current illness or injury


 


88 Not attempted due to Medical conditions or safety concerns








toilet and chair all Mod I to SBA





Gait Training


Does the Patient Walk?:  Yes


Gait Assistive Device:  FWW


[id number] SBA no LOB





Stair Training


 Stair Training: Handrails/:  uses walker


Stairs (FIM):  2


#of Steps:  4


Stairs:  Pattern:  Step to


Level of Assist:  3


skilled verbal instruction for sequence as well as approach etc and use of FWW





Exercises


Seated Therapy Exercises:  Ankle pumps, Sit to stand, Long arc quads


Seated Reps:  12





Treatments


mod assist to [place] shoes for Rx





Assessment


Current Status:  Good Progress


achieved 4 up down pink small step using FWW with min to mod assist and 

instruction for all





PT Short Term Goals


Short Term Goals


Time Frame:  May 23, 2017


Transfers (B,C,W/C) (FIM):  3


Gait (FIM):  1


Distance (FIM):  1=up to 49 ft


Gait Distance Comment:  20'


Gait Level of Assist:  3


Gait Assistive Device:  FWW


Stairs (FIM):  1


# of Steps:  1


Stairs Level of Assist:  3





PT Long Term Goals


Long Term Goals


PT Long Term Goals Time Frame:  2017


Transfers (B,C,W/C) (FIM):  5


Sit to Lying (QC):  5


Lying-Sitting on Side/Bed(QC):  5


Sit to Stand (QC):  5


Rollin


Roll Left to Right (QC):  5


Chair/Bed-to-Chair Xfer(QC):  5


Car Transfer (QC):  5


Does the Patient Walk:  No and Walking Goal IS indicated


Gait (FIM):  2


Gait distance (FIM):  4=984-37 ft


Distance:  75'


Walk 10 feet (QC):  5


Walk 10ft-Uneven Surface(QC):  5


Walk 50ft with 2 Turns (QC):  5


Walk 150 ft (QC):  88


Gait Level of Assist:  5


Gait Assistive Device:  FWW


# of Steps:  4


1 Step (curb) (QC):  4


4 Steps (QC):  4


12 Steps (QC):  88


Stairs Level Of Assist:  5


Picking up an Object (QC):  4





PT Plan


Treatment/Plan


Treatment Plan:  Continue Plan of Care


Treatment Plan:  Bed Mobility, Education, Functional Activity Cha, Group 

Therapy, Gait, Safety, Therapeutic Exercise, Transfers


Treatment Duration:  2017


Visits Per Week:  6-11


Minutes/Day (M-F):  60-90


Minutes/Day (Sat/[place]all[place]):  PRN





Safety Risks/Education


Patient Education:  Gait Training, Steps


Teaching Recipient:  Patient


Teaching Methods:  Demonstration, Discussion


Response to Teaching:  Verbalize Understanding, Return Demonstration, 

Reinforcement Needed





Time/GCodes


Time In:  1300


Time Out:  1330


Total Billed Treatment Time:  30


Total Billed Treatment


1,FA20m,EX10m


G Codes Necessary:  LISA Fontenot PTA May 15, 2017 13:35

## 2017-05-15 NOTE — OCCUPATIONAL THER DAILY NOTE
OT Current Status-Daily Note


Subjective


Pt sitting in chair, agrees to treatment. Pt reports 7/10 left knee and back 

pain. Pt states she is having weakness in left hand, but is able to use it for 

functional tasks.





Mental Status/Objective


              Functional [place] Measure


0=Not Assessed/NA   4=Minimal Assistance


1=Total Assistance   5=Supervision or Setup


2=Maximal Assistance   6=Modified [place]


3=Moderate Assistance   7=Complete [place]





ADL-Treatment


Pt requests to shower today. Sit to stand from recliner with SBA. Gait to 

restroom with FWW, slow pace. Transfer to toilet with SBA. Pt able to complete 

toileting hygiene with SBA. Pt doffed Depends while seated on toilet with SBA 

and much increased time. Cues required for sequencing and task completion. Doff 

socks and shoes with increased time. Transfer to walk in shower with close 

supervision using grab bars for balance. Upper body bathing completed with SBA. 

Pt washed juanita area and bilateral LE with SBA. Stood with CGA for balance while 

washing buttocks. Pt moves very slowly and requires increased time for bathing 

tasks. Pt donned pullover gown with minimal assistance. Pt has difficulty 

orienting gown and requires multiple cues for proper placement of UE into gown. 

Pt able to thread bilateral LE into Depends with increased time. Sit to stand 

with supervision, requires minimal assistance to pull Depends up over hips. Pt 

donned socks with supervision and increased time. Combed hair with set up. Pt 

requires much increased time for ADL tasks, requires cues to stay on task. Pt 

sitting in chair with needs met after session.


              Functional [place] Measure


0=Not Assessed/NA   4=Minimal Assistance


1=Total Assistance   5=Supervision or Setup


2=Maximal Assistance   6=Modified [place]


3=Moderate Assistance   7=Complete IndependenceIRFPAI Quality Coding Scale











6 Independent with activity with or without an assistive device


 


5  Patient requires set up or clean up by helper.  Patient completes activity  

by  themselves


 


4 Supervision or touching assist (CGA). [place] provide cues , steadying assist


 


3 The helper provides less than half the effort to complete the activity


 


2 The helper provides more than half the effort to complete the activity


 


1 Dependent.  The helper does all the effort to complete an activity 


 


7 Patient refused to complete or attempt activity


 


9 The patient did not perform the activity before the current illness or injury


 


88 Not attempted due to Medical conditions or safety concerns








Grooming (FIM):  5


Bathing (FIM):  4


Upper Body (FIM):  4


Lower Body Dressing (FIM):  4


Toilet/Commode Transfer (FIM):  5


Shower Transfer(FIM):  5





OT Short Term Goals


Short Term Goals


Time Frame:  May 16, 2017


Grooming(FIM):  5


Upper Body Dressing(FIM):  4


Lower Body Dressing(FIM):  3


Transfers (B,C,W/C) (FIM):  3


Toilet/Commode Transfer(FIM):  3


Additional Short Term Goals:  1-Demonstrate ADL Tasks, 2-Verbalize Understanding

, 3-ImproveStrength/Cha


1=Demonstrate adherence to instructed precautions during ADL tasks.


2=Patient will verbalize/demonstrate understanding of assistive devices/

modifications for ADL.


3=Patient will improve strength/tolerance for activity to enable patient to 

perform ADL's.





OT Long Term Goals


Long Term Goals


Time Frame:  May 30, 2017


Eating (FIM):  6


Eating (QC):  6


Groomin


Bathing(FIM):  5


Shower/Bathe Self (QC):  4


Upper Body Dressing(FIM):  5


Upper Body Dressing (QC):  4


Lower Body Dressing(FIM):  5


Lower Body Dressing (QC):  4


On/Off Footwear (QC):  5


Toileting(FIM):  5


Toileting Hygiene (QC):  4


Toilet/Commode Transfer(FIM):  5


Toilet/Commode Transfer (QC):  4


Shower Transfer(FIM):  5


Additional Goals:  1-Demonstrate ADL Tasks, 2-Verbalize Understanding, 3-

ImproveStrength/Cha


1=Demonstrate adherence to instructed precautions during ADL tasks.


2=Patient will verbalize/demonstrate understanding of assistive devices/

modifications for ADL.


3=Patient will improve strength/tolerance for activity to enable patient to 

perform ADL's.





OT Education/Plan


Problem List/Assessment


Pt to benefit from skilled OT intervention for ADL training, transfers, 

strengthening, and safety education to improve level of function and allow safe 

discharge plan.





Discharge Recommendations


Plan/Recommendations:  Continue POC





Treatment Plan/Plan of Care


Patient would benefit from OT for education, treatment and training to promote 

independence in ADL's, mobility, safety and/or upper extremity function for ADL'

s.


Plan of Care:  ADL Retraining, Functional Mobility, Group Exercise/Act as Ind, 

UE Funct Exercise/Act, UE Neuromus Re-Ed/Coord


Treatment Duration:  May 30, 2017


Visits Per Week:  10-12


Minutes/Day (M-F):  60-90


Minutes/Day (Sat/[place]):  PRN


Agreement:  Yes


Rehab Potential:  Fair





Time/GCodes


Start Time:  10:00


Stop Time:  11:30


Total Time Billed (hr/min):  90


Billed Treatment Time


1 visit, ADLx6(90minutes)











FAUSTINO PATE OT May 15, 2017 12:35

## 2017-05-16 VITALS — SYSTOLIC BLOOD PRESSURE: 128 MMHG | DIASTOLIC BLOOD PRESSURE: 76 MMHG

## 2017-05-16 VITALS — DIASTOLIC BLOOD PRESSURE: 70 MMHG | SYSTOLIC BLOOD PRESSURE: 126 MMHG

## 2017-05-16 RX ADMIN — NYSTATIN SCH GM: 100000 CREAM TOPICAL at 12:13

## 2017-05-16 RX ADMIN — PREGABALIN SCH MG: 75 CAPSULE ORAL at 12:13

## 2017-05-16 RX ADMIN — INSULIN ASPART SCH UNIT: 100 INJECTION, SOLUTION INTRAVENOUS; SUBCUTANEOUS at 16:47

## 2017-05-16 RX ADMIN — ROPINIROLE SCH MG: 1 TABLET ORAL at 08:11

## 2017-05-16 RX ADMIN — CLINDAMYCIN HYDROCHLORIDE SCH MG: 150 CAPSULE ORAL at 05:58

## 2017-05-16 RX ADMIN — TROSPIUM CHLORIDE SCH MG: 20 TABLET, FILM COATED ORAL at 05:58

## 2017-05-16 RX ADMIN — TROSPIUM CHLORIDE SCH MG: 20 TABLET, FILM COATED ORAL at 16:51

## 2017-05-16 RX ADMIN — INSULIN ASPART SCH UNIT: 100 INJECTION, SOLUTION INTRAVENOUS; SUBCUTANEOUS at 05:58

## 2017-05-16 RX ADMIN — INSULIN ASPART SCH UNIT: 100 INJECTION, SOLUTION INTRAVENOUS; SUBCUTANEOUS at 20:50

## 2017-05-16 RX ADMIN — INSULIN ASPART SCH UNIT: 100 INJECTION, SOLUTION INTRAVENOUS; SUBCUTANEOUS at 11:57

## 2017-05-16 RX ADMIN — CLINDAMYCIN HYDROCHLORIDE SCH MG: 150 CAPSULE ORAL at 12:12

## 2017-05-16 RX ADMIN — ROPINIROLE SCH MG: 1 TABLET ORAL at 20:15

## 2017-05-16 RX ADMIN — METOPROLOL SUCCINATE SCH MG: 50 TABLET, EXTENDED RELEASE ORAL at 08:11

## 2017-05-16 RX ADMIN — DULOXETINE HYDROCHLORIDE SCH MG: 30 CAPSULE, DELAYED RELEASE ORAL at 08:11

## 2017-05-16 RX ADMIN — PREGABALIN SCH MG: 75 CAPSULE ORAL at 08:12

## 2017-05-16 RX ADMIN — FUROSEMIDE SCH MG: 40 TABLET ORAL at 08:11

## 2017-05-16 RX ADMIN — LORATADINE SCH MG: 10 TABLET ORAL at 08:11

## 2017-05-16 RX ADMIN — METFORMIN HYDROCHLORIDE SCH MG: 500 TABLET, FILM COATED ORAL at 06:00

## 2017-05-16 RX ADMIN — NYSTATIN SCH GM: 100000 CREAM TOPICAL at 20:14

## 2017-05-16 RX ADMIN — PANTOPRAZOLE SODIUM SCH MG: 40 TABLET, DELAYED RELEASE ORAL at 06:00

## 2017-05-16 RX ADMIN — ATORVASTATIN CALCIUM SCH MG: 20 TABLET, FILM COATED ORAL at 20:15

## 2017-05-16 RX ADMIN — Medication SCH MG: at 08:11

## 2017-05-16 RX ADMIN — METFORMIN HYDROCHLORIDE SCH MG: 500 TABLET, FILM COATED ORAL at 16:51

## 2017-05-16 RX ADMIN — Medication SCH MG: at 20:15

## 2017-05-16 RX ADMIN — NYSTATIN SCH GM: 100000 CREAM TOPICAL at 08:12

## 2017-05-16 RX ADMIN — ENOXAPARIN SODIUM SCH MG: 100 INJECTION SUBCUTANEOUS at 08:11

## 2017-05-16 RX ADMIN — PREGABALIN SCH MG: 75 CAPSULE ORAL at 20:15

## 2017-05-16 RX ADMIN — CLINDAMYCIN HYDROCHLORIDE SCH MG: 150 CAPSULE ORAL at 00:24

## 2017-05-16 NOTE — PM & R (SOAP) PROGRESS NOTE
Subjective


Subjective/Events-last exam


Patient was seen in her room this AM Case discussed with DR Sullivan PCP re Dr Gonzalez rec re antipsychotic he approves see orders Patient SBA for transfers





Objective


Exam


Last Set of Vital Signs





Vital Signs








  Date Time  Temp Pulse Resp B/P (MAP) Pulse Ox O2 Delivery O2 Flow Rate FiO2


 


5/16/17 05:03 98.0 87 18 126/70 97   


 


5/11/17 05:00      Room Air  





Capillary Refill : Less Than 3 Seconds


I&O











Intake and Output 


 


 5/16/17





 00:00


 


Intake Total 1550 ml


 


Balance 1550 ml


 


 


 


Intake Oral 1550 ml


 


# Voids 6








General:  Alert, Oriented X3, Cooperative, No Acute Distress


HEENT:  Atraumatic, PERRLA, EOMI, Mucous Memb Moist/Pink


Neck:  Supple, No JVD


Lungs:  Clear to Auscultation, Normal Air Movement


Heart:  Regular Rate


Abdomen:  Normal Bowel Sounds, Soft, No Tenderness


Extremities:  Other (chronic dermatitis and edema)


Skin:  Other (Bilateral calf dermatitis with markedly reduced edema. Bilateral 

foot ulcers with scab formation.)


Neuro:  Other (generalized weakness)





Results


Lab


Laboratory Tests


5/13/17 11:03: Glucometer 135H


5/13/17 16:08: Glucometer 106


5/13/17 20:04: Glucometer 146H


5/14/17 06:26: Glucometer 101


5/14/17 11:10: Glucometer 120H


5/14/17 15:55: Glucometer 106


5/14/17 21:16: Glucometer 108


5/15/17 06:15: Glucometer 105


5/15/17 11:09: Glucometer 133H


5/15/17 16:09: Glucometer 92


5/15/17 21:21: Glucometer 103


5/16/17 04:12: Glucometer 93





Microbiology


5/10/17 Urine Culture - Final, Complete


          NO GROWTH





Assessment/Plan


Assessment


General debil


DM


Chronic stasis dermatitis


Anxiety/depression/psychosis?





Plan


Continue PT/OT


Skin car


Trend accuchecks and adjust meds as needed 


TRial of lowdose Zyprexia-See orders


Team Conference tomorrow 5/17/17











ZHANG RODAS MD May 16, 2017 09:10

## 2017-05-16 NOTE — PHYSICAL THERAPY DAILY NOTE
PT Daily Note-Current


Subjective


Pt sitting up in recliner upon arrival.  Pt reports feeling a little tired and 

had she had jsut used restroom a little bit ago but wanted to try again.  Pt 

agreed to PT.





Pain





   Numeric Pain Scale:  8


   Location:  Lower


   Location Body Site:  Back


   Pain Description:  Ache





Mental Status


Patient Orientation:  Person, Place, Situation





Transfers


              Functional [place] Measure


0=Not Assessed/NA   4=Minimal Assistance


1=Total Assistance   5=Supervision or Setup


2=Maximal Assistance   6=Modified [place]


3=Moderate Assistance   7=Complete IndependenceIRFPAI Quality Coding Scale











6 Independent with activity with or without an assistive device


 


5  Patient requires set up or clean up by helper.  Patient completes activity  

by  themselves


 


4 Supervision or touching assist ([facility]). [place] provide cues , steadying assist


 


3 The helper provides less than half the effort to complete the activity


 


2 The helper provides more than half the effort to complete the activity


 


1 Dependent.  The helper does all the effort to complete an activity 


 


7 Patient refused to complete or attempt activity


 


9 The patient did not perform the activity before the current illness or injury


 


88 Not attempted due to Medical conditions or safety concerns








Scootin


Sit to/from Stand:  4


Sit to Stand (QC):  4





Weight Bearing


Weight Bearing Restriction:  Full Weight Bearing


Location Restriction:  LE Bilateral





Gait Training


Does the Patient Walk?:  Yes


Distance (FIM):  3=150 ft


Distance:  250'


Walk 10 feet (QC):  4


Walk 50 ft with 2 Turns(QC):  4


Walk 150 ft (QC):  4


Gait Level of Assist:  4


Gait Persons Needed:  1


Gait Assistive Device:  FWW


Pt walks with slow but steady thelma with no LOB.  Pt needs redirection to 

continue walking while talking.





Wheelchair Training


Does the Pt Use a Wheelchair?:  No





Exercises


NuStep Minutes:  10


 NuStep Workload:  1





Treatments


Pt reports wanting to try to use restroom before leaving for Therapy [facility].  PT 

assists pt with putting shoes on.  Pt transfers from recliner using FWW at [facility] 

and ambulates to restroom.  Pt then ambulates to [facility] using FWW at [facility].  

Pt rests at NuStep before using for 10m at Workload 1.  Pt then takes short 

rest break before returning to recliner to rest in room.  Pt is left in 

recliner with all needs met with nurse present and OT to follow shortly.





Assessment


Current Status:  Fair Progress


Pt needs constant redirection to continue with task while talking during tx.  

Pt is getting stronger and has better activity tolerance but can still make 

further gains before returning home.  Pt needs to gain confidence in self and 

what pt is capable of completing.





PT Short Term Goals


Short Term Goals


Time Frame:  May 23, 2017


Transfers (B,C,W/C) (FIM):  3


Gait (FIM):  1


Distance (FIM):  1=up to 49 ft


Gait Distance Comment:  20'


Gait Level of Assist:  3


Gait Assistive Device:  FWW


Stairs (FIM):  1


# of Steps:  1


Stairs Level of Assist:  3





PT Long Term Goals


Long Term Goals


PT Long Term Goals Time Frame:  2017


Transfers (B,C,W/C) (FIM):  5


Sit to Lying (QC):  5


Lying-Sitting on Side/Bed(QC):  5


Sit to Stand (QC):  5


Rollin


Roll Left to Right (QC):  5


Chair/Bed-to-Chair Xfer(QC):  5


Car Transfer (QC):  5


Does the Patient Walk:  No and Walking Goal IS indicated


Gait (FIM):  2


Gait distance (FIM):  4=879-19 ft


Distance:  75'


Walk 10 feet (QC):  5


Walk 10ft-Uneven Surface(QC):  5


Walk 50ft with 2 Turns (QC):  5


Walk 150 ft (QC):  88


Gait Level of Assist:  5


Gait Assistive Device:  FWW


# of Steps:  4


1 Step (curb) (QC):  4


4 Steps (QC):  4


12 Steps (QC):  88


Stairs Level Of Assist:  5


Picking up an Object (QC):  4





PT Plan


Problem List


Problem List:  Activity Tolerance, Functional Strength, Safety, Balance, Gait, 

Transfer, Bed Mobility





Treatment/Plan


Treatment Plan:  Continue Plan of Care


Treatment Plan:  Bed Mobility, Education, Functional Activity Cha, Group 

Therapy, Gait, Safety, Therapeutic Exercise, Transfers


Treatment Duration:  2017


Visits Per Week:  6-11


Minutes/Day (M-F):  60-90


Minutes/Day (Sat/[place]):  PRN





Safety Risks/Education


Patient Education:  Gait Training, Transfer Techniques, Correct Positioning, 

Safety Issues


Teaching Recipient:  Patient


Teaching Methods:  Discussion


Response to Teaching:  Verbalize Understanding





Time/GCodes


Time In:  900


Time Out:  1000


Total Billed Treatment Time:  60


Total Billed Treatment


visit, GT X2 (30m), EX (15m) & FA (15m)











RASHAAD BRAGG PTA May 16, 2017 11:52

## 2017-05-16 NOTE — OCCUPATIONAL THER DAILY NOTE
OT Current Status-Daily Note


Subjective


Pt alert, sitting in recliner.  PT just finished up treatment.  Pt agreed to 

therapy. No c/o pain only fatigue.





Mental Status/Objective


Patient Orientation:  Person, Place, Time, Situation


              Functional [place] Measure


0=Not Assessed/NA   4=Minimal Assistance


1=Total Assistance   5=Supervision or Setup


2=Maximal Assistance   6=Modified [place]


3=Moderate Assistance   7=Complete [place]





ADL-Treatment


Pt requested to take sponge bathe today.  Pt ambulated slowly with FWW to 

bathroom.  Pt sat in front of sink and completed bathing, dressing and grooming 

with SBA.  Pt completes each task slowly and deliberately so takes increased 

time to complete tasks. Then ambulated to the toilet and transferred with SBA 

using FWW and grabbars.  Pt was able to complete toileting by self, SBA for 

safety.  Pt then ambulated to sink to wash hands.  Ambulated to room and sat in 

recliner.  After therapy, pt sitting in recliner ordering lunch.  Call light/

phone in reach.  All needs met in room.


              Functional [place] Measure


0=Not Assessed/NA   4=Minimal Assistance


1=Total Assistance   5=Supervision or Setup


2=Maximal Assistance   6=Modified [place]


3=Moderate Assistance   7=Complete IndependenceIRFPAI Quality Coding Scale











6 Independent with activity with or without an assistive device


 


5  Patient requires set up or clean up by helper.  Patient completes activity  

by  themselves


 


4 Supervision or touching assist (CGA). [place] provide cues , steadying assist


 


3 The helper provides less than half the effort to complete the activity


 


2 The helper provides more than half the effort to complete the activity


 


1 Dependent.  The helper does all the effort to complete an activity 


 


7 Patient refused to complete or attempt activity


 


9 The patient did not perform the activity before the current illness or injury


 


88 Not attempted due to Medical conditions or safety concerns








Grooming (FIM):  6


Oral Hygiene (QC):  6


Bathing (FIM):  5


Upper Body (FIM):  5


Lower Body Dressing (FIM):  5


Toileting (FIM):  5


Transfers (B, C, W/C) (FIM):  5


Toilet/Commode Transfer (FIM):  5





OT Short Term Goals


Short Term Goals


Time Frame:  May 16, 2017


Grooming(FIM):  5


Upper Body Dressing(FIM):  4


Lower Body Dressing(FIM):  3


Transfers (B,C,W/C) (FIM):  3


Toilet/Commode Transfer(FIM):  3


Additional Short Term Goals:  1-Demonstrate ADL Tasks, 2-Verbalize Understanding

, 3-ImproveStrength/Cha


1=Demonstrate adherence to instructed precautions during ADL tasks.


2=Patient will verbalize/demonstrate understanding of assistive devices/

modifications for ADL.


3=Patient will improve strength/tolerance for activity to enable patient to 

perform ADL's.





OT Long Term Goals


Long Term Goals


Time Frame:  May 30, 2017


Eating (FIM):  6


Eating (QC):  6


Groomin


Bathing(FIM):  5


Shower/Bathe Self (QC):  4


Upper Body Dressing(FIM):  5


Upper Body Dressing (QC):  4


Lower Body Dressing(FIM):  5


Lower Body Dressing (QC):  4


On/Off Footwear (QC):  5


Toileting(FIM):  5


Toileting Hygiene (QC):  4


Toilet/Commode Transfer(FIM):  5


Toilet/Commode Transfer (QC):  4


Shower Transfer(FIM):  5


Additional Goals:  1-Demonstrate ADL Tasks, 2-Verbalize Understanding, 3-

ImproveStrength/Cha


1=Demonstrate adherence to instructed precautions during ADL tasks.


2=Patient will verbalize/demonstrate understanding of assistive devices/

modifications for ADL.


3=Patient will improve strength/tolerance for activity to enable patient to 

perform ADL's.





OT Education/Plan


Problem List/Assessment


Pt to benefit from skilled OT intervention for ADL training, transfers, 

strengthening, and safety education to improve level of function and allow safe 

discharge plan.





Discharge Recommendations


Plan/Recommendations:  Continue POC





Treatment Plan/Plan of Care


Patient would benefit from OT for education, treatment and training to promote 

independence in ADL's, mobility, safety and/or upper extremity function for ADL'

s.


Plan of Care:  ADL Retraining, Functional Mobility, Group Exercise/Act as Ind, 

UE Funct Exercise/Act, UE Neuromus Re-Ed/Coord


Treatment Duration:  May 30, 2017


Visits Per Week:  10-12


Minutes/Day (M-F):  60-90


Minutes/Day (Sat/[place]):  PRN


Agreement:  Yes


Rehab Potential:  Fair





Time/GCodes


Start Time:  10:00


Stop Time:  11:30


Total Time Billed (hr/min):  90


Billed Treatment Time


1 visit-ADL 5 (75 min)  FA 1 (15 min)











SCHUYLER BUSTAMANTE May 16, 2017 11:30

## 2017-05-16 NOTE — PROGRESS NOTE (SOAP)
Subjective


Subjective/Events-last exam


General debility.


Hallucinations.


Diabetes.


Patient states she's doing better





Objective


Exam





Vital Signs








  Date Time  Temp Pulse Resp B/P (MAP) Pulse Ox O2 Delivery O2 Flow Rate FiO2


 


17 05:03 98.0 87 18 126/70 97   


 


5/15/17 18:40 98.4 72 18 124/60 100   














I & O 


 


 17





 07:00


 


Intake Total 1320 ml


 


Balance 1320 ml





Capillary Refill : Less Than 3 Seconds


General Appearance:  No Apparent Distress, Obese


HEENT:  Normal ENT Inspection


Neck:  Full Range of Motion, Normal Inspection


Respiratory:  Chest Non Tender, No Accessory Muscle Use, No Respiratory Distress


Cardiovascular:  Regular Rate, Rhythm, No Murmur


Gastrointestinal:  non tender, soft





Results


Lab


Laboratory Tests


5/15/17 11:09: Glucometer 133H


5/15/17 16:09: Glucometer 92


5/15/17 21:21: Glucometer 103


17 04:12: Glucometer 93





Microbiology


5/10/17 Urine Culture - Final, Complete


          NO GROWTH





Assessment/Plan


Assessment/Plan


Assess & Plan/Chief Complaint


general debility.


Foot ulcers good.


Diabetes 


2 person assist.


.


17.


General debility.


Diabetes.


Patient improving.


.


55/15/70.


General debility.


Confusion


Diabetes..


.


.


General debility.


Diabetes.


Hallucinations.


Patient working progress





Clinical Quality Measures


DVT/VTE Risk/Contraindication:


Risk Factor Score Per Nursin


RFS Level Per Nursing on Admit:  4+=Very High











LELAND DACOSTA DO May 16, 2017 08:53

## 2017-05-16 NOTE — PHYSICAL THERAPY DAILY NOTE
PT Daily Note-Current


Subjective


Pt sitting in recliner with sister Candy present upon arrival.  Pt agrees to PT.





Pain





   Numeric Pain Scale:  8


   Location:  Lower


   Location Body Site:  Back


   Pain Description:  Ache





Mental Status


Patient Orientation:  Person, Place, Situation





Transfers


              Functional [place] Measure


0=Not Assessed/NA   4=Minimal Assistance


1=Total Assistance   5=Supervision or Setup


2=Maximal Assistance   6=Modified [place]


3=Moderate Assistance   7=Complete IndependenceIRFPAI Quality Coding Scale











6 Independent with activity with or without an assistive device


 


5  Patient requires set up or clean up by helper.  Patient completes activity  

by  themselves


 


4 Supervision or touching assist (CGA). [place] provide cues , steadying assist


 


3 The helper provides less than half the effort to complete the activity


 


2 The helper provides more than half the effort to complete the activity


 


1 Dependent.  The helper does all the effort to complete an activity 


 


7 Patient refused to complete or attempt activity


 


9 The patient did not perform the activity before the current illness or injury


 


88 Not attempted due to Medical conditions or safety concerns








Scootin


Sit to/from Stand:  5


Sit to Stand (QC):  5





Weight Bearing


Weight Bearing Restriction:  Full Weight Bearing


Location Restriction:  LE Bilateral





Treatments


PT assisted pt with putting pants on.  Pt transferred from sitting in recliner 

to standing to pull up pants before returning to sitting in recliner.  PT and 

pt discussed with both pt and pt's sister what happens at Wednesday Discharge 

Mtg. as well as what happens to get pt discharged.  Pt is left in recliner with 

all needs met at end of tx.





Assessment


Current Status:  Fair Progress


Pt needs VC for redirection during tx.





PT Short Term Goals


Short Term Goals


Time Frame:  May 23, 2017


Transfers (B,C,W/C) (FIM):  3


Gait (FIM):  1


Distance (FIM):  1=up to 49 ft


Gait Distance Comment:  20'


Gait Level of Assist:  3


Gait Assistive Device:  FWW


Stairs (FIM):  1


# of Steps:  1


Stairs Level of Assist:  3





PT Long Term Goals


Long Term Goals


PT Long Term Goals Time Frame:  2017


Transfers (B,C,W/C) (FIM):  5


Sit to Lying (QC):  5


Lying-Sitting on Side/Bed(QC):  5


Sit to Stand (QC):  5


Rollin


Roll Left to Right (QC):  5


Chair/Bed-to-Chair Xfer(QC):  5


Car Transfer (QC):  5


Does the Patient Walk:  No and Walking Goal IS indicated


Gait (FIM):  2


Gait distance (FIM):  8=378-46 ft


Distance:  75'


Walk 10 feet (QC):  5


Walk 10ft-Uneven Surface(QC):  5


Walk 50ft with 2 Turns (QC):  5


Walk 150 ft (QC):  88


Gait Level of Assist:  5


Gait Assistive Device:  FWW


# of Steps:  4


1 Step (curb) (QC):  4


4 Steps (QC):  4


12 Steps (QC):  88


Stairs Level Of Assist:  5


Picking up an Object (QC):  4





PT Plan


Problem List


Problem List:  Activity Tolerance, Functional Strength, Safety, Balance, Gait





Treatment/Plan


Treatment Plan:  Continue Plan of Care


Treatment Plan:  Bed Mobility, Education, Functional Activity Cha, Group 

Therapy, Gait, Safety, Therapeutic Exercise, Transfers


Treatment Duration:  2017


Visits Per Week:  6-11


Minutes/Day (M-F):  60-90


Minutes/Day (Sat/[place]):  PRN





Safety Risks/Education


Patient Education:  Transfer Techniques, Correct Positioning, Safety Issues


Teaching Recipient:  Patient


Teaching Methods:  Discussion


Response to Teaching:  Verbalize Understanding





Time/GCodes


Time In:  1330


Time Out:  1400


Total Billed Treatment Time:  30


Total Billed Treatment


visit, FA X2 (30m)











RASHAAD BRAGG PTA May 16, 2017 15:57

## 2017-05-17 VITALS — DIASTOLIC BLOOD PRESSURE: 69 MMHG | SYSTOLIC BLOOD PRESSURE: 124 MMHG

## 2017-05-17 VITALS — SYSTOLIC BLOOD PRESSURE: 127 MMHG | DIASTOLIC BLOOD PRESSURE: 69 MMHG

## 2017-05-17 RX ADMIN — INSULIN ASPART SCH UNIT: 100 INJECTION, SOLUTION INTRAVENOUS; SUBCUTANEOUS at 20:55

## 2017-05-17 RX ADMIN — PREGABALIN SCH MG: 75 CAPSULE ORAL at 13:48

## 2017-05-17 RX ADMIN — Medication SCH MG: at 21:29

## 2017-05-17 RX ADMIN — ATORVASTATIN CALCIUM SCH MG: 20 TABLET, FILM COATED ORAL at 21:29

## 2017-05-17 RX ADMIN — FUROSEMIDE SCH MG: 40 TABLET ORAL at 08:47

## 2017-05-17 RX ADMIN — INSULIN ASPART SCH UNIT: 100 INJECTION, SOLUTION INTRAVENOUS; SUBCUTANEOUS at 05:20

## 2017-05-17 RX ADMIN — METFORMIN HYDROCHLORIDE SCH MG: 500 TABLET, FILM COATED ORAL at 05:51

## 2017-05-17 RX ADMIN — TROSPIUM CHLORIDE SCH MG: 20 TABLET, FILM COATED ORAL at 05:51

## 2017-05-17 RX ADMIN — PANTOPRAZOLE SODIUM SCH MG: 40 TABLET, DELAYED RELEASE ORAL at 05:51

## 2017-05-17 RX ADMIN — ENOXAPARIN SODIUM SCH MG: 100 INJECTION SUBCUTANEOUS at 08:47

## 2017-05-17 RX ADMIN — METFORMIN HYDROCHLORIDE SCH MG: 500 TABLET, FILM COATED ORAL at 16:56

## 2017-05-17 RX ADMIN — NYSTATIN SCH GM: 100000 CREAM TOPICAL at 21:29

## 2017-05-17 RX ADMIN — INSULIN ASPART SCH UNIT: 100 INJECTION, SOLUTION INTRAVENOUS; SUBCUTANEOUS at 16:28

## 2017-05-17 RX ADMIN — PREGABALIN SCH MG: 75 CAPSULE ORAL at 21:29

## 2017-05-17 RX ADMIN — TROSPIUM CHLORIDE SCH MG: 20 TABLET, FILM COATED ORAL at 16:56

## 2017-05-17 RX ADMIN — OLANZAPINE SCH MG: 2.5 TABLET, FILM COATED ORAL at 08:47

## 2017-05-17 RX ADMIN — ROPINIROLE SCH MG: 1 TABLET ORAL at 21:29

## 2017-05-17 RX ADMIN — Medication SCH MG: at 08:47

## 2017-05-17 RX ADMIN — ROPINIROLE SCH MG: 1 TABLET ORAL at 08:47

## 2017-05-17 RX ADMIN — LORATADINE SCH MG: 10 TABLET ORAL at 08:47

## 2017-05-17 RX ADMIN — METOPROLOL SUCCINATE SCH MG: 50 TABLET, EXTENDED RELEASE ORAL at 08:47

## 2017-05-17 RX ADMIN — PREGABALIN SCH MG: 75 CAPSULE ORAL at 08:47

## 2017-05-17 RX ADMIN — NYSTATIN SCH GM: 100000 CREAM TOPICAL at 13:49

## 2017-05-17 RX ADMIN — DULOXETINE HYDROCHLORIDE SCH MG: 30 CAPSULE, DELAYED RELEASE ORAL at 08:47

## 2017-05-17 RX ADMIN — INSULIN ASPART SCH UNIT: 100 INJECTION, SOLUTION INTRAVENOUS; SUBCUTANEOUS at 10:55

## 2017-05-17 RX ADMIN — NYSTATIN SCH GM: 100000 CREAM TOPICAL at 08:48

## 2017-05-17 NOTE — PHYSICAL THERAPY DAILY NOTE
PT Daily Note-Current


Subjective


Pt laying supine across bed upon arrival.  Pt reports sleeping well and 

appeared very relaxed.  Pt still worried about cat and other home situations 

and communicated to PT.  Pt agreed to PT.





Pain





   Numeric Pain Scale:  8


   Location:  Lower


   Location Body Site:  Back


   Pain Description:  Ache





Mental Status


Patient Orientation:  Person, Place, Situation





Transfers


              Functional [place] Measure


0=Not Assessed/NA   4=Minimal Assistance


1=Total Assistance   5=Supervision or Setup


2=Maximal Assistance   6=Modified [place]


3=Moderate Assistance   7=Complete IndependenceIRFPAI Quality Coding Scale











6 Independent with activity with or without an assistive device


 


5  Patient requires set up or clean up by helper.  Patient completes activity  

by  themselves


 


4 Supervision or touching assist ([facility]). [place] provide cues , steadying assist


 


3 The helper provides less than half the effort to complete the activity


 


2 The helper provides more than half the effort to complete the activity


 


1 Dependent.  The helper does all the effort to complete an activity 


 


7 Patient refused to complete or attempt activity


 


9 The patient did not perform the activity before the current illness or injury


 


88 Not attempted due to Medical conditions or safety concerns








Scootin


Rollin


Roll Left to Right (QC):  5


Supine to/from Sit:  4


Sit to/from Stand:  4


Sit to Stand (QC):  4





Weight Bearing


Weight Bearing Restriction:  Full Weight Bearing


Location Restriction:  LE Bilateral





Gait Training


Does the Patient Walk?:  Yes


Distance (FIM):  3=150 ft


Distance:  350'


Walk 10 feet (QC):  5


Walk 50 ft with 2 Turns(QC):  5


Walk 150 ft (QC):  5


Gait Level of Assist:  5


Gait Persons Needed:  1


Gait Assistive Device:  FWW


Pt walks with very slow and guarded thelma but no LOB, steady gait.  Pt needs 

redirection with VC to stay on task while ambulating.





Wheelchair Training


Does the Pt Use a Wheelchair?:  No





Treatments


Pt transferred from Supine to EOB at [facility] then EOB to Standing using FWW at [facility].

  Pt ambulates to restroom to use before ambulating in Therapy [employer].  PT 

also assists pr with donning pants and robe to walk in [employer].  Pt ambulates 

in [employer] using FWW at [facility].  Pt returns to recliner to rest in room at end of 

tx with all needs met.





Assessment


Current Status:  Fair Progress


Pt needs frequent redirection to stay on task during tx.  PT gives VC.  Pt's 

activity tolerance is improving.





PT Short Term Goals


Short Term Goals


Time Frame:  May 23, 2017


Transfers (B,C,W/C) (FIM):  3


Gait (FIM):  1


Distance (FIM):  1=up to 49 ft


Gait Distance Comment:  20'


Gait Level of Assist:  3


Gait Assistive Device:  FWW


Stairs (FIM):  1


# of Steps:  1


Stairs Level of Assist:  3





PT Long Term Goals


Long Term Goals


PT Long Term Goals Time Frame:  2017


Transfers (B,C,W/C) (FIM):  5


Sit to Lying (QC):  5


Lying-Sitting on Side/Bed(QC):  5


Sit to Stand (QC):  5


Rollin


Roll Left to Right (QC):  5


Chair/Bed-to-Chair Xfer(QC):  5


Car Transfer (QC):  5


Does the Patient Walk:  No and Walking Goal IS indicated


Gait (FIM):  2


Gait distance (FIM):  8=379-56 ft


Distance:  75'


Walk 10 feet (QC):  5


Walk 10ft-Uneven Surface(QC):  5


Walk 50ft with 2 Turns (QC):  5


Walk 150 ft (QC):  88


Gait Level of Assist:  5


Gait Assistive Device:  FWW


# of Steps:  4


1 Step (curb) (QC):  4


4 Steps (QC):  4


12 Steps (QC):  88


Stairs Level Of Assist:  5


Picking up an Object (QC):  4





PT Plan


Problem List


Problem List:  Activity Tolerance, Functional Strength, Safety, Balance, Gait, 

Transfer, Bed Mobility





Treatment/Plan


Treatment Plan:  Continue Plan of Care


Treatment Plan:  Bed Mobility, Education, Functional Activity Cha, Group 

Therapy, Gait, Safety, Therapeutic Exercise, Transfers


Treatment Duration:  2017


Visits Per Week:  6-11


Minutes/Day (M-F):  60-90


Minutes/Day (Sat/[place]):  PRN





Safety Risks/Education


Patient Education:  Gait Training, Transfer Techniques, Correct Positioning, 

Safety Issues


Teaching Recipient:  Patient


Teaching Methods:  Discussion


Response to Teaching:  Verbalize Understanding





Time/GCodes


Time In:  815


Time Out:  915


Total Billed Treatment Time:  60


Total Billed Treatment


visit, FA X2 (30m) & GT X2 (30m)











RASHAAD BRAGG PTA May 17, 2017 09:30

## 2017-05-17 NOTE — OCCUPATIONAL THER DAILY NOTE
OT Current Status-Daily Note


Subjective


Pt sitting in chair, agrees to treatment. Pt reports 8/10 back and left knee 

pain, but states showering makes it feel better.





Mental Status/Objective


              Functional [place] Measure


0=Not Assessed/NA   4=Minimal Assistance


1=Total Assistance   5=Supervision or Setup


2=Maximal Assistance   6=Modified [place]


3=Moderate Assistance   7=Complete [place]





ADL-Treatment


Pt requests to shower today. Sit to stand from chair with supervision. Pt 

retrieved clothing from closet with FWW and SBA. Gait to restroom with FWW, no 

LOB noted. Toilet transfer and toileting completed x2 during session with SBA. 

Pt doffed clothing with SBA. Transfer to walk in shower with bench with 

supervision using grab bars for balance and safety. Pt able to wash/dry all 

areas with increased time. Stood using grab bar for balance while washing 

buttocks and juanita area. Pt donned pullover shirt with verbal cues. Pt attempted 

to don shirt while it was inside out, cues to correct. Pt also required assist 

to properly orient shirt. Pt donned Dep[place] and pants with supervision for 

balance during pant hike. Pt required increased time to start pants over feet, 

but did not require physical assistance. Don socks with set up. Grooming tasks 

completed standing at sink. Pt combed hair and completed denture care with 

modified independence. Transfer to chair with supervision. Pt requires 

increased time for all ADL tasks. Occasional redirection and cues to attend to 

task are required. Pt sitting in chair with needs met after session.


              Functional [place] Measure


0=Not Assessed/NA   4=Minimal Assistance


1=Total Assistance   5=Supervision or Setup


2=Maximal Assistance   6=Modified [place]


3=Moderate Assistance   7=Complete IndependenceIRFPAI Quality Coding Scale











6 Independent with activity with or without an assistive device


 


5  Patient requires set up or clean up by helper.  Patient completes activity  

by  themselves


 


4 Supervision or touching assist ([facility]). [place] provide cues , steadying assist


 


3 The helper provides less than half the effort to complete the activity


 


2 The helper provides more than half the effort to complete the activity


 


1 Dependent.  The helper does all the effort to complete an activity 


 


7 Patient refused to complete or attempt activity


 


9 The patient did not perform the activity before the current illness or injury


 


88 Not attempted due to Medical conditions or safety concerns








Grooming (FIM):  6


Oral Hygiene (QC):  6


Bathing (FIM):  5


Shower/Bathe Self (QC):  4


Upper Body (FIM):  5


Lower Body Dressing (FIM):  5


Lower Body Dressing (QC):  4


On/Off Footwear (QC):  5


Toileting (FIM):  5


Toileting Hygiene (QC):  4


Toilet/Commode Transfer (FIM):  5


Toilet Transfer (QC):  4


Shower Transfer(FIM):  5





OT Short Term Goals


Short Term Goals


Time Frame:  May 16, 2017


Grooming(FIM):  5


Upper Body Dressing(FIM):  4


Lower Body Dressing(FIM):  3


Transfers (B,C,W/C) (FIM):  3


Toilet/Commode Transfer(FIM):  3


Additional Short Term Goals:  1-Demonstrate ADL Tasks, 2-Verbalize Understanding

, 3-ImproveStrength/Cha


1=Demonstrate adherence to instructed precautions during ADL tasks.


2=Patient will verbalize/demonstrate understanding of assistive devices/

modifications for ADL.


3=Patient will improve strength/tolerance for activity to enable patient to 

perform ADL's.





OT Long Term Goals


Long Term Goals


Time Frame:  May 30, 2017


Eating (FIM):  6


Eating (QC):  6


Groomin


Bathing(FIM):  5


Shower/Bathe Self (QC):  4


Upper Body Dressing(FIM):  5


Upper Body Dressing (QC):  4


Lower Body Dressing(FIM):  5


Lower Body Dressing (QC):  4


On/Off Footwear (QC):  5


Toileting(FIM):  5


Toileting Hygiene (QC):  4


Toilet/Commode Transfer(FIM):  5


Toilet/Commode Transfer (QC):  4


Shower Transfer(FIM):  5


Additional Goals:  1-Demonstrate ADL Tasks, 2-Verbalize Understanding, 3-

ImproveStrength/Cha


1=Demonstrate adherence to instructed precautions during ADL tasks.


2=Patient will verbalize/demonstrate understanding of assistive devices/

modifications for ADL.


3=Patient will improve strength/tolerance for activity to enable patient to 

perform ADL's.





OT Education/Plan


Problem List/Assessment


Pt to benefit from skilled OT intervention for ADL training, transfers, 

strengthening, and safety education to improve level of function and allow safe 

discharge plan.





Discharge Recommendations


Plan/Recommendations:  Continue POC





Treatment Plan/Plan of Care


Patient would benefit from OT for education, treatment and training to promote 

independence in ADL's, mobility, safety and/or upper extremity function for ADL'

s.


Plan of Care:  ADL Retraining, Functional Mobility, Group Exercise/Act as Ind, 

UE Funct Exercise/Act, UE Neuromus Re-Ed/Coord


Treatment Duration:  May 30, 2017


Visits Per Week:  10-12


Minutes/Day (M-F):  60-90


Minutes/Day (Sat/[place]):  PRN


Agreement:  Yes


Rehab Potential:  Fair





Time/GCodes


Start Time:  10:00


Stop Time:  11:30


Total Time Billed (hr/min):  90


Billed Treatment Time


1 visit, ADLx6(90minutes)











FAUSTINO PATE OT May 17, 2017 13:10

## 2017-05-17 NOTE — PROGRESS NOTE (SOAP)
Subjective


Subjective/Events-last exam


General debility.


Diabetes.


Hallucination.


Patient states she's feeling better today and able to do more.


Patient voices no complaints





Objective


Exam





Vital Signs








  Date Time  Temp Pulse Resp B/P (MAP) Pulse Ox O2 Delivery O2 Flow Rate FiO2


 


17 05:33 97.2 77 18 127/69 97   


 


17 18:27 97.5 73 16 128/76 100   














I & O 


 


 17





 07:00


 


Intake Total 1440 ml


 


Balance 1440 ml





Capillary Refill : Less Than 3 Seconds


General Appearance:  No Apparent Distress, WD/WN


HEENT:  Normal ENT Inspection


Neck:  Full Range of Motion, Normal Inspection


Respiratory:  Normal Breath Sounds, No Accessory Muscle Use, No Respiratory 

Distress


Cardiovascular:  Regular Rate, Rhythm





Results


Lab


Laboratory Tests


17 11:49: Glucometer 134H


17 16:00: Glucometer 141H


17 20:28: Glucometer 126H


17 05:00: Glucometer 128H





Microbiology


5/10/17 Urine Culture - Final, Complete


          NO GROWTH





Assessment/Plan


Assessment/Plan


Assess & Plan/Chief Complaint


general debility.


Foot ulcers good.


Diabetes 


2 person assist.


.


17.


General debility.


Diabetes.


Patient improving.


.


55/15/70.


General debility.


Confusion


Diabetes..


.


.


General debility.


Diabetes.


Hallucinations.


Patient working progress.


.


17.


General debility.


Diabetes.


Patient voices no complaints.


Patient feels she is improving





Clinical Quality Measures


DVT/VTE Risk/Contraindication:


Risk Factor Score Per Nursin


RFS Level Per Nursing on Admit:  4+=Very High











LELAND DACOSTA DO May 17, 2017 08:11

## 2017-05-17 NOTE — PHYSICAL THERAPY DAILY NOTE
PT Daily Note-Current


Subjective


Pt asleep sitting in recliner upon arrival.  Pt agrees to PT.





Pain





   Numeric Pain Scale:  8


   Location:  Lower


   Location Body Site:  Back


   Pain Description:  Ache





Mental Status


Patient Orientation:  Person, Place, Situation





Transfers


              Functional [place] Measure


0=Not Assessed/NA   4=Minimal Assistance


1=Total Assistance   5=Supervision or Setup


2=Maximal Assistance   6=Modified [place]


3=Moderate Assistance   7=Complete IndependenceIRFPAI Quality Coding Scale











6 Independent with activity with or without an assistive device


 


5  Patient requires set up or clean up by helper.  Patient completes activity  

by  themselves


 


4 Supervision or touching assist ([facility]). [place] provide cues , steadying assist


 


3 The helper provides less than half the effort to complete the activity


 


2 The helper provides more than half the effort to complete the activity


 


1 Dependent.  The helper does all the effort to complete an activity 


 


7 Patient refused to complete or attempt activity


 


9 The patient did not perform the activity before the current illness or injury


 


88 Not attempted due to Medical conditions or safety concerns











Weight Bearing


Weight Bearing Restriction:  Full Weight Bearing


Location Restriction:  LE Bilateral





Exercises


Seated Therapy Exercises:  Long arc quads, Hip flexion, Kicking activity, Hip 

abd/add


Seated Reps:  15 (2 sets)





Treatments


Pt completes Seated Ex in recliner for tx X2 sets.  Pt is left sitting in 

recliner visiting with Housekeeping at end of tx with all needs met.





Assessment


Current Status:  Fair Progress


Pt continues to need redirection in VC form to remain on task during tx instead 

of just visiting.





PT Short Term Goals


Short Term Goals


Time Frame:  May 23, 2017


Transfers (B,C,W/C) (FIM):  3


Gait (FIM):  1


Distance (FIM):  1=up to 49 ft


Gait Distance Comment:  20'


Gait Level of Assist:  3


Gait Assistive Device:  FWW


Stairs (FIM):  1


# of Steps:  1


Stairs Level of Assist:  3





PT Long Term Goals


Long Term Goals


PT Long Term Goals Time Frame:  2017


Transfers (B,C,W/C) (FIM):  5


Sit to Lying (QC):  5


Lying-Sitting on Side/Bed(QC):  5


Sit to Stand (QC):  5


Rollin


Roll Left to Right (QC):  5


Chair/Bed-to-Chair Xfer(QC):  5


Car Transfer (QC):  5


Does the Patient Walk:  No and Walking Goal IS indicated


Gait (FIM):  2


Gait distance (FIM):  5=706-63 ft


Distance:  75'


Walk 10 feet (QC):  5


Walk 10ft-Uneven Surface(QC):  5


Walk 50ft with 2 Turns (QC):  5


Walk 150 ft (QC):  88


Gait Level of Assist:  5


Gait Assistive Device:  FWW


# of Steps:  4


1 Step (curb) (QC):  4


4 Steps (QC):  4


12 Steps (QC):  88


Stairs Level Of Assist:  5


Picking up an Object (QC):  4





PT Plan


Problem List


Problem List:  Activity Tolerance, Functional Strength, Safety, Balance, Gait, 

Transfer, Bed Mobility





Treatment/Plan


Treatment Plan:  Continue Plan of Care


Treatment Plan:  Bed Mobility, Education, Functional Activity Cha, Group 

Therapy, Gait, Safety, Therapeutic Exercise, Transfers


Treatment Duration:  2017


Visits Per Week:  6-11


Minutes/Day (M-F):  60-90


Minutes/Day (Sat/[place]):  PRN





Safety Risks/Education


Patient Education:  Transfer Techniques, Correct Positioning, Safety Issues


Teaching Recipient:  Patient


Teaching Methods:  Discussion


Response to Teaching:  Verbalize Understanding





Time/GCodes


Time In:  1300


Time Out:  1330


Total Billed Treatment Time:  30


Total Billed Treatment


visit, EX X2 (30m)











RASHAAD BRAGG PTA May 17, 2017 13:14

## 2017-05-17 NOTE — PM & R (SOAP) PROGRESS NOTE
Subjective


Subjective/Events-last exam


Patient was seen in her room earlier today Patient min assist for transfers 

Tolerating Zyprexia well





Objective


Exam


Last Set of Vital Signs





Vital Signs








  Date Time  Temp Pulse Resp B/P (MAP) Pulse Ox O2 Delivery O2 Flow Rate FiO2


 


5/17/17 17:23 97.5 87 18 124/69 97   


 


5/11/17 05:00      Room Air  





Capillary Refill : Less Than 3 Seconds


I&O











Intake and Output 


 


 5/17/17





 00:00


 


Intake Total 1700 ml


 


Balance 1700 ml


 


 


 


Intake Oral 1700 ml


 


# Voids 7








General:  Alert, Oriented X3, Cooperative, No Acute Distress


HEENT:  Atraumatic, PERRLA, EOMI, Mucous Memb Moist/Pink


Neck:  Supple, No JVD


Lungs:  Clear to Auscultation, Normal Air Movement


Heart:  Regular Rate


Abdomen:  Normal Bowel Sounds, Soft, No Tenderness


Extremities:  Other (chronic dermatitis and edema)


Skin:  Other (Bilateral calf dermatitis with markedly reduced edema. Bilateral 

foot ulcers with scab formation.)


Neuro:  Other (generalized weakness)





Results


Lab


Laboratory Tests


5/14/17 21:16: Glucometer 108


5/15/17 06:15: Glucometer 105


5/15/17 11:09: Glucometer 133H


5/15/17 16:09: Glucometer 92


5/15/17 21:21: Glucometer 103


5/16/17 04:12: Glucometer 93


5/16/17 11:49: Glucometer 134H


5/16/17 16:00: Glucometer 141H


5/16/17 20:28: Glucometer 126H


5/17/17 05:00: Glucometer 128H


5/17/17 10:52: Glucometer 107


5/17/17 15:43: Glucometer 135H





Microbiology


5/10/17 Urine Culture - Final, Complete


          NO GROWTH





Assessment/Plan


Assessment


General debil


DM


Chronic stasis dermatitis


Anxiety/depression/psychosis?





Plan


Continue PT/OT


Skin car


Trend accuchecks and adjust meds as needed 


TRial of lowdose Zyprexia-See orders-done


Team Conference held earlier today-See report for full functional update and POC


Discharge set tentatively for next week-will follow-up with SW re details


F/U with DR holder PCP ZHANG DAVIS MD May 17, 2017 20:33

## 2017-05-18 VITALS — DIASTOLIC BLOOD PRESSURE: 70 MMHG | SYSTOLIC BLOOD PRESSURE: 117 MMHG

## 2017-05-18 VITALS — SYSTOLIC BLOOD PRESSURE: 116 MMHG | DIASTOLIC BLOOD PRESSURE: 68 MMHG

## 2017-05-18 LAB
ALBUMIN SERPL-MCNC: 3.4 G/DL (ref 3.2–4.5)
ALT SERPL-CCNC: 17 U/L (ref 0–55)
ANION GAP SERPL CALC-SCNC: 11 MMOL/L (ref 5–14)
AST SERPL-CCNC: 17 U/L (ref 5–34)
BILIRUB SERPL-MCNC: 0.1 MG/DL (ref 0.1–1)
BUN SERPL-MCNC: 24 MG/DL (ref 7–18)
BUN/CREAT SERPL: 30
CALCIUM SERPL-MCNC: 9.4 MG/DL (ref 8.5–10.1)
CHLORIDE SERPL-SCNC: 103 MMOL/L (ref 98–107)
CO2 SERPL-SCNC: 26 MMOL/L (ref 21–32)
CREAT SERPL-MCNC: 0.8 MG/DL (ref 0.6–1.3)
GFR SERPLBLD BASED ON 1.73 SQ M-ARVRAT: > 60 ML/MIN
GLUCOSE SERPL-MCNC: 115 MG/DL (ref 70–105)
POTASSIUM SERPL-SCNC: 3.7 MMOL/L (ref 3.6–5)
PROT SERPL-MCNC: 6.1 G/DL (ref 6.4–8.2)
SODIUM SERPL-SCNC: 140 MMOL/L (ref 135–145)

## 2017-05-18 RX ADMIN — TROSPIUM CHLORIDE SCH MG: 20 TABLET, FILM COATED ORAL at 06:51

## 2017-05-18 RX ADMIN — NYSTATIN SCH GM: 100000 CREAM TOPICAL at 13:50

## 2017-05-18 RX ADMIN — ATORVASTATIN CALCIUM SCH MG: 20 TABLET, FILM COATED ORAL at 20:11

## 2017-05-18 RX ADMIN — METFORMIN HYDROCHLORIDE SCH MG: 500 TABLET, FILM COATED ORAL at 06:51

## 2017-05-18 RX ADMIN — TROSPIUM CHLORIDE SCH MG: 20 TABLET, FILM COATED ORAL at 16:11

## 2017-05-18 RX ADMIN — INSULIN ASPART SCH UNIT: 100 INJECTION, SOLUTION INTRAVENOUS; SUBCUTANEOUS at 11:15

## 2017-05-18 RX ADMIN — METFORMIN HYDROCHLORIDE SCH MG: 500 TABLET, FILM COATED ORAL at 16:11

## 2017-05-18 RX ADMIN — DULOXETINE HYDROCHLORIDE SCH MG: 30 CAPSULE, DELAYED RELEASE ORAL at 09:16

## 2017-05-18 RX ADMIN — INSULIN ASPART SCH UNIT: 100 INJECTION, SOLUTION INTRAVENOUS; SUBCUTANEOUS at 16:10

## 2017-05-18 RX ADMIN — INSULIN ASPART SCH UNIT: 100 INJECTION, SOLUTION INTRAVENOUS; SUBCUTANEOUS at 06:00

## 2017-05-18 RX ADMIN — OLANZAPINE SCH MG: 2.5 TABLET, FILM COATED ORAL at 09:16

## 2017-05-18 RX ADMIN — METOPROLOL SUCCINATE SCH MG: 50 TABLET, EXTENDED RELEASE ORAL at 09:16

## 2017-05-18 RX ADMIN — NYSTATIN SCH GM: 100000 CREAM TOPICAL at 20:11

## 2017-05-18 RX ADMIN — PREGABALIN SCH MG: 75 CAPSULE ORAL at 13:50

## 2017-05-18 RX ADMIN — ROPINIROLE SCH MG: 1 TABLET ORAL at 09:16

## 2017-05-18 RX ADMIN — Medication SCH MG: at 20:10

## 2017-05-18 RX ADMIN — Medication SCH MG: at 09:16

## 2017-05-18 RX ADMIN — NYSTATIN SCH GM: 100000 CREAM TOPICAL at 09:16

## 2017-05-18 RX ADMIN — PREGABALIN SCH MG: 75 CAPSULE ORAL at 09:16

## 2017-05-18 RX ADMIN — LORATADINE SCH MG: 10 TABLET ORAL at 09:16

## 2017-05-18 RX ADMIN — ROPINIROLE SCH MG: 1 TABLET ORAL at 20:11

## 2017-05-18 RX ADMIN — ENOXAPARIN SODIUM SCH MG: 100 INJECTION SUBCUTANEOUS at 09:16

## 2017-05-18 RX ADMIN — PREGABALIN SCH MG: 75 CAPSULE ORAL at 20:11

## 2017-05-18 RX ADMIN — FUROSEMIDE SCH MG: 40 TABLET ORAL at 09:16

## 2017-05-18 RX ADMIN — PANTOPRAZOLE SODIUM SCH MG: 40 TABLET, DELAYED RELEASE ORAL at 06:51

## 2017-05-18 RX ADMIN — INSULIN ASPART SCH UNIT: 100 INJECTION, SOLUTION INTRAVENOUS; SUBCUTANEOUS at 21:42

## 2017-05-18 NOTE — PM & R (SOAP) PROGRESS NOTE
Subjective


Subjective/Events-last exam


Patient was seen in her room this AM Patient SBA for transfers Current labs 

noted Tolerating Zyprexia well





Objective


Exam


Last Set of Vital Signs





Vital Signs








  Date Time  Temp Pulse Resp B/P (MAP) Pulse Ox O2 Delivery O2 Flow Rate FiO2


 


5/18/17 06:10 97.6 78 20 116/68 95   





Capillary Refill : Less Than 3 Seconds


I&O











Intake and Output 


 


 5/18/17





 00:00


 


Intake Total 940 ml


 


Balance 940 ml


 


 


 


Intake Oral 940 ml


 


# Voids 7


 


# Bowel Movements 1








General:  Alert, Oriented X3, Cooperative, No Acute Distress


HEENT:  Atraumatic, PERRLA, EOMI, Mucous Memb Moist/Pink


Neck:  Supple, No JVD


Lungs:  Clear to Auscultation, Normal Air Movement


Heart:  Regular Rate


Abdomen:  Normal Bowel Sounds, Soft, No Tenderness


Extremities:  Other (chronic dermatitis and edema)


Skin:  Other (Bilateral calf dermatitis with markedly reduced edema. Bilateral 

foot ulcers with scab formation.)


Neuro:  Other (generalized weakness)





Results


Lab


Laboratory Tests


5/15/17 16:09: Glucometer 92


5/15/17 21:21: Glucometer 103


5/16/17 04:12: Glucometer 93


5/16/17 11:49: Glucometer 134H


5/16/17 16:00: Glucometer 141H


5/16/17 20:28: Glucometer 126H


5/17/17 05:00: Glucometer 128H


5/17/17 10:52: Glucometer 107


5/17/17 15:43: Glucometer 135H


5/17/17 20:37: Glucometer 106


5/18/17 05:20: Glucometer 113H


5/18/17 05:22: 


Sodium Level 140, Potassium Level 3.7, Chloride Level 103, Carbon Dioxide Level 

26, Anion Gap 11, Blood Urea Nitrogen 24H, Creatinine 0.80, Estimat Glomerular 

Filtration Rate > 60, BUN/Creatinine Ratio 30, Glucose Level 115H, Calcium 

Level 9.4, Total Bilirubin 0.1, Aspartate Amino Transf (AST/SGOT) 17, Alanine 

Aminotransferase (ALT/SGPT) 17, Alkaline Phosphatase 77, Total Protein 6.1L, 

Albumin 3.4


5/18/17 11:00: Glucometer 116H





Microbiology


5/10/17 Urine Culture - Final, Complete


          NO GROWTH





Assessment/Plan


Assessment


General debil


DM


Chronic stasis dermatitis


Anxiety/depression/psychosis?





Plan


Continue PT/OT


Trend accuchecks and adjust meds as needed 


TRial of lowdose Zyprexia-See orders-done


Team Conference held yesterday-See report for full functional update and POC


Discharge set tentatively for next week-will follow-up with SW re details


F/U with DR holder PCP ZHANG DAVIS MD May 18, 2017 11:38

## 2017-05-18 NOTE — OCCUPATIONAL THER DAILY NOTE
OT Current Status-Daily Note


Subjective


Pt sitting in chair, agrees to treatment. Pt reports 8/10 back pain and left 

knee pain. Pt states she would like to shower because it makes her pain 

decrease.





Mental Status/Objective


              Functional [place] Measure


0=Not Assessed/NA   4=Minimal Assistance


1=Total Assistance   5=Supervision or Setup


2=Maximal Assistance   6=Modified [place]


3=Moderate Assistance   7=Complete [place]





ADL-Treatment


Pt sit to stand with supervision. Gait to restroom with FWW, slow pace. Pt 

transferred to walk in shower with bench with SBA using grab bars. Seated 

bathing completed using hand held shower. Pt able to wash/dry all areas with 

SBA and increased time. Don nightgown with set up. Pt donned [place] with 

supervision and increased time. Don socks with set up and increased time. 

Toilet transfer completed with SBA using grab bars for balance and safety. Pt 

able to complete toileting hygiene and clothing management with SBA . Stood at 

sink to wash hands, comb hair, and brush teeth with modified independence. Pt 

returned to chair with SBA. Pt requires increased time for all ADL tasks.


              Functional [place] Measure


0=Not Assessed/NA   4=Minimal Assistance


1=Total Assistance   5=Supervision or Setup


2=Maximal Assistance   6=Modified [place]


3=Moderate Assistance   7=Complete IndependenceIRFPAI Quality Coding Scale











6 Independent with activity with or without an assistive device


 


5  Patient requires set up or clean up by helper.  Patient completes activity  

by  themselves


 


4 Supervision or touching assist (CGA). [place] provide cues , steadying assist


 


3 The helper provides less than half the effort to complete the activity


 


2 The helper provides more than half the effort to complete the activity


 


1 Dependent.  The helper does all the effort to complete an activity 


 


7 Patient refused to complete or attempt activity


 


9 The patient did not perform the activity before the current illness or injury


 


88 Not attempted due to Medical conditions or safety concerns








Grooming (FIM):  6


Oral Hygiene (QC):  6


Bathing (FIM):  5


Upper Body (FIM):  5


Lower Body Dressing (FIM):  5


On/Off Footwear (QC):  5


Toileting (FIM):  5


Toilet/Commode Transfer (FIM):  5


Shower Transfer(FIM):  5





Other Treatment


Pt completed bilateral hand  exercises x20 reps to increase  strength 

for functional tasks. Pt completed putty activity with left hand to increase 

strength and coordination. Pt has decreased strength, but is able to remove 

small beads from putty with increased time. Pt performed two UE exercises x10 

reps with theraband to increase strength for ADLs and transfers. Pt has mild 

difficulty maintaining grasp with left hand. Cues required for proper exercise 

technique and to attend to task. Pt sitting in chair with needs met after 

session.





OT Short Term Goals


Short Term Goals


Time Frame:  May 16, 2017


Grooming(FIM):  5


Upper Body Dressing(FIM):  4


Lower Body Dressing(FIM):  3


Transfers (B,C,W/C) (FIM):  3


Toilet/Commode Transfer(FIM):  3


Additional Short Term Goals:  1-Demonstrate ADL Tasks, 2-Verbalize Understanding

, 3-ImproveStrength/Cha


1=Demonstrate adherence to instructed precautions during ADL tasks.


2=Patient will verbalize/demonstrate understanding of assistive devices/

modifications for ADL.


3=Patient will improve strength/tolerance for activity to enable patient to 

perform ADL's.





OT Long Term Goals


Long Term Goals


Time Frame:  May 30, 2017


Eating (FIM):  6


Eating (QC):  6


Groomin


Bathing(FIM):  5


Shower/Bathe Self (QC):  4


Upper Body Dressing(FIM):  5


Upper Body Dressing (QC):  4


Lower Body Dressing(FIM):  5


Lower Body Dressing (QC):  4


On/Off Footwear (QC):  5


Toileting(FIM):  5


Toileting Hygiene (QC):  4


Toilet/Commode Transfer(FIM):  5


Toilet/Commode Transfer (QC):  4


Shower Transfer(FIM):  5


Additional Goals:  1-Demonstrate ADL Tasks, 2-Verbalize Understanding, 3-

ImproveStrength/Cha


1=Demonstrate adherence to instructed precautions during ADL tasks.


2=Patient will verbalize/demonstrate understanding of assistive devices/

modifications for ADL.


3=Patient will improve strength/tolerance for activity to enable patient to 

perform ADL's.





OT Education/Plan


Problem List/Assessment


Pt to benefit from skilled OT intervention for ADL training, transfers, 

strengthening, and safety education to improve level of function and allow safe 

discharge plan.





Discharge Recommendations


Plan/Recommendations:  Continue POC





Treatment Plan/Plan of Care


Patient would benefit from OT for education, treatment and training to promote 

independence in ADL's, mobility, safety and/or upper extremity function for ADL'

s.


Plan of Care:  ADL Retraining, Functional Mobility, Group Exercise/Act as Ind, 

UE Funct Exercise/Act, UE Neuromus Re-Ed/Coord


Treatment Duration:  May 30, 2017


Visits Per Week:  10-12


Minutes/Day (M-F):  60-90


Minutes/Day (Sat/[place]):  PRN


Agreement:  Yes


Rehab Potential:  Fair





Time/GCodes


Start Time:  09:15


Stop Time:  10:45


Total Time Billed (hr/min):  90


Billed Treatment Time


1 visit, ADLx5(70minutes), EX(20minutes)











FAUSTINO PATE OT May 18, 2017 12:03

## 2017-05-18 NOTE — PHYSICAL THERAPY DAILY NOTE
PT Daily Note-Current


Subjective


Pt asleep laying in recliner upon arrival.  Pt appeared more down today during 

tx although pt didn't communicate anything going on.  Pt agrees to PT.





Pain





   Numeric Pain Scale:  8


   Location:  Lower


   Location Body Site:  Back


   Comment:  Pt having pain in LB, neck and knees today.





Mental Status


Patient Orientation:  Person, Place, Situation





Transfers


              Functional [place] Measure


0=Not Assessed/NA   4=Minimal Assistance


1=Total Assistance   5=Supervision or Setup


2=Maximal Assistance   6=Modified [place]


3=Moderate Assistance   7=Complete IndependenceIRFPAI Quality Coding Scale











6 Independent with activity with or without an assistive device


 


5  Patient requires set up or clean up by helper.  Patient completes activity  

by  themselves


 


4 Supervision or touching assist (CGA). [place] provide cues , steadying assist


 


3 The helper provides less than half the effort to complete the activity


 


2 The helper provides more than half the effort to complete the activity


 


1 Dependent.  The helper does all the effort to complete an activity 


 


7 Patient refused to complete or attempt activity


 


9 The patient did not perform the activity before the current illness or injury


 


88 Not attempted due to Medical conditions or safety concerns








Scootin


Sit to/from Stand:  5


Sit to Stand (QC):  5





Weight Bearing


Weight Bearing Restriction:  Full Weight Bearing


Location Restriction:  LE Bilateral





Gait Training


Does the Patient Walk?:  Yes


Distance (FIM):  3=150 ft


Distance:  250'


Walk 10 feet (QC):  5


Walk 50 ft with 2 Turns(QC):  5


Walk 150 ft (QC):  5


Gait Level of Assist:  5


Gait Persons Needed:  1


Gait Assistive Device:  FWW


Pt walks with slow thelma but steady with no LOB.





Wheelchair Training


Does the Pt Use a Wheelchair?:  No





Exercises


NuStep Minutes:  13


 NuStep Workload:  2





Treatments


PT asssited pt with donning shoes for tx.  Pt's L foot is slightly more swollen 

today when donning shoe.  Pt transferred from recliner to standing and 

ambulated to restroom using FWW.  Pt ambulated in [facility] using FWW at 

[facility].  Pt used NuStep for 13m at Workload 2 and had to use brace for L thigh 

since it wanted to ABD to uncomfortable position during tx.  Pt then completed 

Seated Ex in chair before ambulating back to room to rest in recliner before 

OT.  Pt is in recliner with all needs met at end of tx.





Assessment


Current Status:  Fair Progress


Pt is less talkative today.  Pt completes tx although LLE tends to be more ABD 

during ambulation and EX than previous txs.





PT Short Term Goals


Short Term Goals


Time Frame:  May 23, 2017


Transfers (B,C,W/C) (FIM):  3


Gait (FIM):  1


Distance (FIM):  1=up to 49 ft


Gait Distance Comment:  20'


Gait Level of Assist:  3


Gait Assistive Device:  FWW


Stairs (FIM):  1


# of Steps:  1


Stairs Level of Assist:  3





PT Long Term Goals


Long Term Goals


PT Long Term Goals Time Frame:  2017


Transfers (B,C,W/C) (FIM):  5


Sit to Lying (QC):  5


Lying-Sitting on Side/Bed(QC):  5


Sit to Stand (QC):  5


Rollin


Roll Left to Right (QC):  5


Chair/Bed-to-Chair Xfer(QC):  5


Car Transfer (QC):  5


Does the Patient Walk:  No and Walking Goal IS indicated


Gait (FIM):  2


Gait distance (FIM):  8=781-82 ft


Distance:  75'


Walk 10 feet (QC):  5


Walk 10ft-Uneven Surface(QC):  5


Walk 50ft with 2 Turns (QC):  5


Walk 150 ft (QC):  88


Gait Level of Assist:  5


Gait Assistive Device:  FWW


# of Steps:  4


1 Step (curb) (QC):  4


4 Steps (QC):  4


12 Steps (QC):  88


Stairs Level Of Assist:  5


Picking up an Object (QC):  4





PT Plan


Problem List


Problem List:  Activity Tolerance, Safety, Balance, Gait





Treatment/Plan


Treatment Plan:  Continue Plan of Care


Treatment Plan:  Bed Mobility, Education, Functional Activity Cha, Group 

Therapy, Gait, Safety, Therapeutic Exercise, Transfers


Treatment Duration:  2017


Visits Per Week:  6-11


Minutes/Day (M-F):  60-90


Minutes/Day (Sat/[place]):  PRN





Safety Risks/Education


Patient Education:  Gait Training, Transfer Techniques, Correct Positioning, 

Safety Issues


Teaching Recipient:  Patient


Teaching Methods:  Discussion


Response to Teaching:  Verbalize Understanding





Time/GCodes


Time In:  815


Time Out:  915


Total Billed Treatment Time:  60


Total Billed Treatment


visit, FA X2 (30m), GT (15m) & EX (15m)











RASHAAD BRAGG PTA May 18, 2017 10:28

## 2017-05-18 NOTE — PROGRESS NOTE (SOAP)
Subjective


Subjective/Events-last exam


patient doing better.


Patient having pain in the neck and low back.


General debility.


Diabetes.





Objective


Exam





Vital Signs








  Date Time  Temp Pulse Resp B/P (MAP) Pulse Ox O2 Delivery O2 Flow Rate FiO2


 


17 06:10 97.6 78 20 116/68 95   


 


17 21:10     97   


 


17 17:23 97.5 87 18 124/69 97   














I & O 


 


 17





 06:59


 


Intake Total 1100 ml


 


Balance 1100 ml





Capillary Refill : Less Than 3 Seconds


General Appearance:  No Apparent Distress, WD/WN


HEENT:  Normal ENT Inspection


Neck:  Normal Inspection


Respiratory:  Chest Non Tender, Lungs Clear, Normal Breath Sounds, No Accessory 

Muscle Use, No Respiratory Distress


Cardiovascular:  Regular Rate, Rhythm





Results


Lab


Laboratory Tests


17 05:22








Laboratory Tests


17 10:52: Glucometer 107


17 15:43: Glucometer 135H


17 20:37: Glucometer 106


17 05:20: Glucometer 113H


17 05:22: 


Sodium Level 140, Potassium Level 3.7, Chloride Level 103, Carbon Dioxide Level 

26, Anion Gap 11, Blood Urea Nitrogen 24H, Creatinine 0.80, Estimat Glomerular 

Filtration Rate > 60, BUN/Creatinine Ratio 30, Glucose Level 115H, Calcium 

Level 9.4, Total Bilirubin 0.1, Aspartate Amino Transf (AST/SGOT) 17, Alanine 

Aminotransferase (ALT/SGPT) 17, Alkaline Phosphatase 77, Total Protein 6.1L, 

Albumin 3.4





Microbiology


5/10/17 Urine Culture - Final, Complete


          NO GROWTH





Assessment/Plan


Assessment/Plan


Assess & Plan/Chief Complaint


general debility.


Foot ulcers good.


Diabetes 


2 person assist.


.


17.


General debility.


Diabetes.


Patient improving.


.


55/15/70.


General debility.


Confusion


Diabetes..


.


.


General debility.


Diabetes.


Hallucinations.


Patient working progress.


.


17.


General debility.


Diabetes.


Patient voices no complaints.


Patient feels she is improving.


.


17 generalized debility.


 diabetes.


Patient improving.


Patient having neck and back pain lower





Clinical Quality Measures


DVT/VTE Risk/Contraindication:


Risk Factor Score Per Nursin


RFS Level Per Nursing on Admit:  4+=Very High











GELLENDER,LELAND A DO May 18, 2017 08:25

## 2017-05-18 NOTE — PHYSICAL THERAPY DAILY NOTE
PT Daily Note-Current


Subjective


Pt sitting in recliner upon arrival.  Pt is more talkative like normal this 

afternoon.  Pt agrees to PT.





Pain





   Numeric Pain Scale:  8


   Location:  Lower


   Location Body Site:  Back


   Pain Description:  Ache


   Comment:  Pt has pain in LB and knees (stiffness) this afternoon.





Mental Status


Patient Orientation:  Person, Place, Situation





Transfers


              Functional [place] Measure


0=Not Assessed/NA   4=Minimal Assistance


1=Total Assistance   5=Supervision or Setup


2=Maximal Assistance   6=Modified [place]


3=Moderate Assistance   7=Complete IndependenceIRFPAI Quality Coding Scale











6 Independent with activity with or without an assistive device


 


5  Patient requires set up or clean up by helper.  Patient completes activity  

by  themselves


 


4 Supervision or touching assist (CGA). [place] provide cues , steadying assist


 


3 The helper provides less than half the effort to complete the activity


 


2 The helper provides more than half the effort to complete the activity


 


1 Dependent.  The helper does all the effort to complete an activity 


 


7 Patient refused to complete or attempt activity


 


9 The patient did not perform the activity before the current illness or injury


 


88 Not attempted due to Medical conditions or safety concerns








Scootin


Sit to/from Stand:  5


Sit to Stand (QC):  5





Weight Bearing


Weight Bearing Restriction:  Full Weight Bearing


Location Restriction:  LE Bilateral





Gait Training


Does the Patient Walk?:  Yes


Distance (FIM):  1=up to 49 ft


Distance:  40'


Walk 10 feet (QC):  5


Walk 50 ft with 2 Turns(QC):  5


Walk 150 ft (QC):  5


Gait Level of Assist:  5


Gait Persons Needed:  1


Gait Assistive Device:  FWW





Wheelchair Training


Does the Pt Use a Wheelchair?:  No





Exercises


Seated Therapy Exercises:  Ankle pumps, Long arc quads, Hip flexion, Kicking 

activity


Seated Reps:  15





Treatments


Pt transferred from recliner to standing to ambulate to restroom.  After using 

restroom, pt returns to recliner to complete Seated Ex.  Pt needs VC to stay on 

task during tx.  Pt is in recliner with all needs met at end of tx.





Assessment


Current Status:  Fair Progress


Pt needs VC to stay on task during tx.





PT Short Term Goals


Short Term Goals


Time Frame:  May 23, 2017


Transfers (B,C,W/C) (FIM):  3


Gait (FIM):  1


Distance (FIM):  1=up to 49 ft


Gait Distance Comment:  20'


Gait Level of Assist:  3


Gait Assistive Device:  FWW


Stairs (FIM):  1


# of Steps:  1


Stairs Level of Assist:  3





PT Long Term Goals


Long Term Goals


PT Long Term Goals Time Frame:  2017


Transfers (B,C,W/C) (FIM):  5


Sit to Lying (QC):  5


Lying-Sitting on Side/Bed(QC):  5


Sit to Stand (QC):  5


Rollin


Roll Left to Right (QC):  5


Chair/Bed-to-Chair Xfer(QC):  5


Car Transfer (QC):  5


Does the Patient Walk:  No and Walking Goal IS indicated


Gait (FIM):  2


Gait distance (FIM):  2=299-62 ft


Distance:  75'


Walk 10 feet (QC):  5


Walk 10ft-Uneven Surface(QC):  5


Walk 50ft with 2 Turns (QC):  5


Walk 150 ft (QC):  88


Gait Level of Assist:  5


Gait Assistive Device:  FWW


# of Steps:  4


1 Step (curb) (QC):  4


4 Steps (QC):  4


12 Steps (QC):  88


Stairs Level Of Assist:  5


Picking up an Object (QC):  4





PT Plan


Problem List


Problem List:  Activity Tolerance, Safety, Balance, Gait





Treatment/Plan


Treatment Plan:  Continue Plan of Care


Treatment Plan:  Bed Mobility, Education, Functional Activity Cha, Group 

Therapy, Gait, Safety, Therapeutic Exercise, Transfers


Treatment Duration:  2017


Visits Per Week:  6-11


Minutes/Day (M-F):  60-90


Minutes/Day (Sat/[place]):  PRN





Safety Risks/Education


Patient Education:  Transfer Techniques, Correct Positioning, Safety Issues


Teaching Recipient:  Patient


Teaching Methods:  Discussion


Response to Teaching:  Verbalize Understanding





Time/GCodes


Time In:  1300


Time Out:  1330


Total Billed Treatment Time:  30


Total Billed Treatment


visit, FA (15m) & EX (15m)











RASHAAD BRAGG PTA May 18, 2017 15:21

## 2017-05-19 VITALS — SYSTOLIC BLOOD PRESSURE: 144 MMHG | DIASTOLIC BLOOD PRESSURE: 78 MMHG

## 2017-05-19 VITALS — SYSTOLIC BLOOD PRESSURE: 104 MMHG | DIASTOLIC BLOOD PRESSURE: 66 MMHG

## 2017-05-19 RX ADMIN — ROPINIROLE SCH MG: 1 TABLET ORAL at 20:40

## 2017-05-19 RX ADMIN — METFORMIN HYDROCHLORIDE SCH MG: 500 TABLET, FILM COATED ORAL at 06:07

## 2017-05-19 RX ADMIN — Medication SCH MG: at 20:40

## 2017-05-19 RX ADMIN — ENOXAPARIN SODIUM SCH MG: 100 INJECTION SUBCUTANEOUS at 08:11

## 2017-05-19 RX ADMIN — FUROSEMIDE SCH MG: 40 TABLET ORAL at 08:11

## 2017-05-19 RX ADMIN — ATORVASTATIN CALCIUM SCH MG: 20 TABLET, FILM COATED ORAL at 20:40

## 2017-05-19 RX ADMIN — TROSPIUM CHLORIDE SCH MG: 20 TABLET, FILM COATED ORAL at 06:08

## 2017-05-19 RX ADMIN — PREGABALIN SCH MG: 75 CAPSULE ORAL at 20:40

## 2017-05-19 RX ADMIN — METFORMIN HYDROCHLORIDE SCH MG: 500 TABLET, FILM COATED ORAL at 16:42

## 2017-05-19 RX ADMIN — INSULIN ASPART SCH UNIT: 100 INJECTION, SOLUTION INTRAVENOUS; SUBCUTANEOUS at 16:40

## 2017-05-19 RX ADMIN — ROPINIROLE SCH MG: 1 TABLET ORAL at 08:11

## 2017-05-19 RX ADMIN — INSULIN ASPART SCH UNIT: 100 INJECTION, SOLUTION INTRAVENOUS; SUBCUTANEOUS at 06:08

## 2017-05-19 RX ADMIN — INSULIN ASPART SCH UNIT: 100 INJECTION, SOLUTION INTRAVENOUS; SUBCUTANEOUS at 20:40

## 2017-05-19 RX ADMIN — TROSPIUM CHLORIDE SCH MG: 20 TABLET, FILM COATED ORAL at 16:40

## 2017-05-19 RX ADMIN — LORATADINE SCH MG: 10 TABLET ORAL at 08:11

## 2017-05-19 RX ADMIN — NYSTATIN SCH GM: 100000 CREAM TOPICAL at 20:40

## 2017-05-19 RX ADMIN — OLANZAPINE SCH MG: 2.5 TABLET, FILM COATED ORAL at 08:11

## 2017-05-19 RX ADMIN — NYSTATIN SCH GM: 100000 CREAM TOPICAL at 08:12

## 2017-05-19 RX ADMIN — INSULIN ASPART SCH UNIT: 100 INJECTION, SOLUTION INTRAVENOUS; SUBCUTANEOUS at 11:10

## 2017-05-19 RX ADMIN — NYSTATIN SCH GM: 100000 CREAM TOPICAL at 12:59

## 2017-05-19 RX ADMIN — DULOXETINE HYDROCHLORIDE SCH MG: 30 CAPSULE, DELAYED RELEASE ORAL at 08:11

## 2017-05-19 RX ADMIN — PREGABALIN SCH MG: 75 CAPSULE ORAL at 08:11

## 2017-05-19 RX ADMIN — PREGABALIN SCH MG: 75 CAPSULE ORAL at 12:59

## 2017-05-19 RX ADMIN — Medication SCH MG: at 08:11

## 2017-05-19 RX ADMIN — METOPROLOL SUCCINATE SCH MG: 50 TABLET, EXTENDED RELEASE ORAL at 08:11

## 2017-05-19 RX ADMIN — PANTOPRAZOLE SODIUM SCH MG: 40 TABLET, DELAYED RELEASE ORAL at 06:07

## 2017-05-19 NOTE — PROGRESS NOTE (SOAP)
Subjective


Subjective/Events-last exam


general debility.


Diabetes.


Hallucination.


Patient doing better with physical  therapy and occupational therapy





Objective


Exam





Vital Signs








  Date Time  Temp Pulse Resp B/P (MAP) Pulse Ox O2 Delivery O2 Flow Rate FiO2


 


17 04:43 97.0 72 18 144/78 100   


 


17 18:24 97.0 72 18 117/70 97   














I & O 


 


 17





 07:00


 


Intake Total 1120 ml


 


Balance 1120 ml





Capillary Refill : Less Than 3 Seconds


General Appearance:  No Apparent Distress, WD/WN


HEENT:  Normal ENT Inspection


Neck:  Normal Inspection


Respiratory:  No Accessory Muscle Use, No Respiratory Distress





Results


Lab


Laboratory Tests


17 11:00: Glucometer 116H


17 15:52: Glucometer 144H


17 21:41: Glucometer 134H


17 05:47: Glucometer 106





Microbiology


5/10/17 Urine Culture - Final, Complete


          NO GROWTH





Assessment/Plan


Assessment/Plan


Assess & Plan/Chief Complaint


general debility.


Foot ulcers good.


Diabetes 


2 person assist.


.


17.


General debility.


Diabetes.


Patient improving.


.


55/15/70.


General debility.


Confusion


Diabetes..


.


.


General debility.


Diabetes.


Hallucinations.


Patient working progress.


.


17.


General debility.


Diabetes.


Patient voices no complaints.


Patient feels she is improving.


.


17 generalized debility.


 diabetes.


Patient improving.


Patient having neck and back pain lower..


.


17 generalized ddebility.


Diabetes.


Patient getting around better





Clinical Quality Measures


DVT/VTE Risk/Contraindication:


Risk Factor Score Per Nursin


RFS Level Per Nursing on Admit:  4+=Very High











LELAND DACOSTA DO May 19, 2017 09:00

## 2017-05-19 NOTE — THERAPY GROUP DAILY NOTE
Therapy Daily Group Note


Patient Education Topic


Other List Below (benefits of exercise)





Exercises


LE Seated Exercise, UE Exercise





Other/Notes


Pt. attended group PT session.  Pt. ambulated with FWW and SBA.  Pt. was very 

social , laughing and making conversation.  Group question : "what was your 

first car?" Pts. all had a great time sharing the old model they first drove.  

Pt. was glad to participate in pt. lead exercise by reading and demonstrating 

exercise from a prewritten card.  Education focused on benefits of exercise. P. 

to room after group, in bed with bell at hand


Start Time:  13:00


Stop Time:  13:40


Total Billed Treatment Time:  40


Total Billed Treatment


1,GRP











LISA VENTURA PTA May 19, 2017 14:00

## 2017-05-19 NOTE — PM & R (SOAP) PROGRESS NOTE
Subjective


Subjective/Events-last exam


Patient was seen in her room this AM patient SBA for transfers Patient with 

less rambling of speech with Zyprexia





Objective


Exam


Last Set of Vital Signs





Vital Signs








  Date Time  Temp Pulse Resp B/P (MAP) Pulse Ox O2 Delivery O2 Flow Rate FiO2


 


5/19/17 04:43 97.0 72 18 144/78 100   





Capillary Refill : Less Than 3 Seconds


I&O











Intake and Output 


 


 5/19/17





 00:00


 


Intake Total 1120 ml


 


Balance 1120 ml


 


 


 


Intake Oral 1120 ml


 


# Voids 8








General:  Alert, Oriented X3, Cooperative, No Acute Distress


HEENT:  Atraumatic, PERRLA, EOMI, Mucous Memb Moist/Pink


Neck:  Supple, No JVD


Lungs:  Clear to Auscultation, Normal Air Movement


Heart:  Regular Rate


Abdomen:  Normal Bowel Sounds, Soft, No Tenderness


Extremities:  Other (chronic dermatitis and edema)


Skin:  Other (Bilateral calf dermatitis with markedly reduced edema. Bilateral 

foot ulcers with scab formation.)


Neuro:  Other (generalized weakness)





Results


Lab


Laboratory Tests


5/16/17 11:49: Glucometer 134H


5/16/17 16:00: Glucometer 141H


5/16/17 20:28: Glucometer 126H


5/17/17 05:00: Glucometer 128H


5/17/17 10:52: Glucometer 107


5/17/17 15:43: Glucometer 135H


5/17/17 20:37: Glucometer 106


5/18/17 05:20: Glucometer 113H


5/18/17 05:22: 


Sodium Level 140, Potassium Level 3.7, Chloride Level 103, Carbon Dioxide Level 

26, Anion Gap 11, Blood Urea Nitrogen 24H, Creatinine 0.80, Estimat Glomerular 

Filtration Rate > 60, BUN/Creatinine Ratio 30, Glucose Level 115H, Calcium 

Level 9.4, Total Bilirubin 0.1, Aspartate Amino Transf (AST/SGOT) 17, Alanine 

Aminotransferase (ALT/SGPT) 17, Alkaline Phosphatase 77, Total Protein 6.1L, 

Albumin 3.4


5/18/17 11:00: Glucometer 116H


5/18/17 15:52: Glucometer 144H


5/18/17 21:41: Glucometer 134H


5/19/17 05:47: Glucometer 106





Microbiology


5/10/17 Urine Culture - Final, Complete


          NO GROWTH





Assessment/Plan


Assessment


General debil


DM


Chronic stasis dermatitis


Anxiety/depression/psychosis?





Plan


Continue PT/OT


Trend accuchecks and adjust meds as needed 


TRial of lowdose Zyprexia-See orders-done


Team Conference held 5/17/17-See report for full functional update and POC


Discharge set tentatively for next week-will follow-up with SW re details


F/U with DR holder PCP ZHANG DAVIS MD May 19, 2017 08:02

## 2017-05-19 NOTE — PHYSICAL THERAPY DAILY NOTE
PT Daily Note-Current


Subjective


Pt. agrees to rx. States she has used her lift recline chair quite a while and 

never gets in out of bed.  Pt. cannot sup to side to sit , has great fear of 

falling.





Pain





   Numeric Pain Scale:  3


   Location:  Medial


   Location Body Site:  Back


   Pain Description:  Ache





Mental Status


Patient Orientation:  Person, Place, Time, Situation





Transfers


              Functional [place] Measure


0=Not Assessed/NA   4=Minimal Assistance


1=Total Assistance   5=Supervision or Setup


2=Maximal Assistance   6=Modified [place]


3=Moderate Assistance   7=Complete IndependenceIRFPAI Quality Coding Scale











6 Independent with activity with or without an assistive device


 


5  Patient requires set up or clean up by helper.  Patient completes activity  

by  themselves


 


4 Supervision or touching assist (CGA). [place] provide cues , steadying assist


 


3 The helper provides less than half the effort to complete the activity


 


2 The helper provides more than half the effort to complete the activity


 


1 Dependent.  The helper does all the effort to complete an activity 


 


7 Patient refused to complete or attempt activity


 


9 The patient did not perform the activity before the current illness or injury


 


88 Not attempted due to Medical conditions or safety concerns








Transfers (B, C, W/C) (FIM):  3


Scootin


Rolling:  3


Supine to/from Sit:  3


Sit to/from Stand:  5


Bed to/from Chair:  5


pt. very fearful of rolling to come to side to sit which this PTA explained 

would protect her back and be easier but pts fear of falling prohibits and she 

actually resists. therefore needs mod assist if rolling from side. Pt. uses 

lift recline chair at home full time and sleeps in it.





Gait Training


Does the Patient Walk?:  Yes


Gait (FIM):  5


Distance (FIM):  3=150 ft (x2)


Gait Level of Assist:  5


Gait Persons Needed:  1


Gait Assistive Device:  FWW


walks very slow, head down, talks and stops a lot, needs reminded to continue





Stair Training


declines stairs this Rx





Exercises


Supine Ex:  Ankle pumps, Rolling (mod), Heel Slides, Short Arc Quads, Scooting, 

Hip abd/add


Supine Reps:  15


Seated Therapy Exercises:  Sit to stand, Long arc quads, Hip flexion


Seated Reps:  10





Assessment


Current Status:  Good Progress





PT Short Term Goals


Short Term Goals


Time Frame:  May 23, 2017


Transfers (B,C,W/C) (FIM):  3


Gait (FIM):  1


Distance (FIM):  1=up to 49 ft


Gait Distance Comment:  20'


Gait Level of Assist:  3


Gait Assistive Device:  FWW


Stairs (FIM):  1


# of Steps:  1


Stairs Level of Assist:  3





PT Long Term Goals


Long Term Goals


PT Long Term Goals Time Frame:  2017


Transfers (B,C,W/C) (FIM):  5


Sit to Lying (QC):  5


Lying-Sitting on Side/Bed(QC):  5


Sit to Stand (QC):  5


Rollin


Roll Left to Right (QC):  5


Chair/Bed-to-Chair Xfer(QC):  5


Car Transfer (QC):  5


Does the Patient Walk:  No and Walking Goal IS indicated


Gait (FIM):  2


Gait distance (FIM):  5=448-51 ft


Distance:  75'


Walk 10 feet (QC):  5


Walk 10ft-Uneven Surface(QC):  5


Walk 50ft with 2 Turns (QC):  5


Walk 150 ft (QC):  88


Gait Level of Assist:  5


Gait Assistive Device:  FWW


# of Steps:  4


1 Step (curb) (QC):  4


4 Steps (QC):  4


12 Steps (QC):  88


Stairs Level Of Assist:  5


Picking up an Object (QC):  4





PT Plan


Treatment/Plan


Treatment Plan:  Continue Plan of Care


Treatment Plan:  Bed Mobility, Education, Functional Activity Cha, Group 

Therapy, Gait, Safety, Therapeutic Exercise, Transfers


Treatment Duration:  2017


Visits Per Week:  6-11


Minutes/Day (M-F):  60-90


Minutes/Day (Sat/[place]):  PRN





Safety Risks/Education


Patient Education:  Gait Training, Transfer Techniques, Correct Positioning, 

Disease Process, Safety Issues


Teaching Recipient:  Patient


Teaching Methods:  Demonstration, Discussion


Response to Teaching:  Verbalize Understanding, Return Demonstration, 

Reinforcement Needed





Time/GCodes


Time In:  800


Time Out:  900


Total Billed Treatment Time:  60


Total Billed Treatment


1,GT25m,EX15m,FA20m


G Codes Necessary:  LISA Fontenot PTA May 19, 2017 09:09

## 2017-05-19 NOTE — OCCUPATIONAL THER DAILY NOTE
OT Current Status-Daily Note


Subjective


Pt sitting in chair, agrees to treatment. Pt reports 8/10 back pain.





Mental Status/Objective


              Functional [place] Measure


0=Not Assessed/NA   4=Minimal Assistance


1=Total Assistance   5=Supervision or Setup


2=Maximal Assistance   6=Modified [place]


3=Moderate Assistance   7=Complete [place]





ADL-Treatment


Pt declined shower, but would like to sponge bathe today. Sit to stand from 

chair with supervision. Gait to restroom with FWW. Pt completed toilet transfer 

x2 during session with SBA using grab bars for safety. Pt able to complete 

toileting hygiene and clothing management with SBA. Sponge bath completed at 

sink with set up and increased time. Pt donned pullover gown with verbal cues 

for technique and to properly orient shirt. Pt put shirt on backward and 

required cues to correct. Pt donned [place] with SBA and increased time. Stood 

with supervision for balance during pant hike. Grooming tasks completed 

standing at sink with modified independence. Increased time required for all 

ADLs. Pt easily distracted with conversation, requires cues to attend to task.


              Functional [place] Measure


0=Not Assessed/NA   4=Minimal Assistance


1=Total Assistance   5=Supervision or Setup


2=Maximal Assistance   6=Modified [place]


3=Moderate Assistance   7=Complete IndependenceIRFPAI Quality Coding Scale











6 Independent with activity with or without an assistive device


 


5  Patient requires set up or clean up by helper.  Patient completes activity  

by  themselves


 


4 Supervision or touching assist (CGA). [place] provide cues , steadying assist


 


3 The helper provides less than half the effort to complete the activity


 


2 The helper provides more than half the effort to complete the activity


 


1 Dependent.  The helper does all the effort to complete an activity 


 


7 Patient refused to complete or attempt activity


 


9 The patient did not perform the activity before the current illness or injury


 


88 Not attempted due to Medical conditions or safety concerns








Grooming (FIM):  6


Bathing (FIM):  5


Upper Body (FIM):  5


Lower Body Dressing (FIM):  5


Toileting (FIM):  5


Toilet/Commode Transfer (FIM):  5





Other Treatment


Gait to therapy gym with FWW. Pt has decreased speed and takes frequent 

standing breaks, requires cues to continue. Arm bike x10 minutes to increase 

overall strength and activity tolerance needed for ADLs and transfers. Pt 

completed task with minimal resistance and steady pace. No rest breaks 

required. Pt returned to room with increased time. Transfer to chair with 

supervision. Pt sitting in chair with needs met after session.





OT Short Term Goals


Short Term Goals


Time Frame:  May 16, 2017


Grooming(FIM):  5


Upper Body Dressing(FIM):  4


Lower Body Dressing(FIM):  3


Transfers (B,C,W/C) (FIM):  3


Toilet/Commode Transfer(FIM):  3


Additional Short Term Goals:  1-Demonstrate ADL Tasks, 2-Verbalize Understanding

, 3-ImproveStrength/Cha


1=Demonstrate adherence to instructed precautions during ADL tasks.


2=Patient will verbalize/demonstrate understanding of assistive devices/

modifications for ADL.


3=Patient will improve strength/tolerance for activity to enable patient to 

perform ADL's.





OT Long Term Goals


Long Term Goals


Time Frame:  May 30, 2017


Eating (FIM):  6


Eating (QC):  6


Groomin


Bathing(FIM):  5


Shower/Bathe Self (QC):  4


Upper Body Dressing(FIM):  5


Upper Body Dressing (QC):  4


Lower Body Dressing(FIM):  5


Lower Body Dressing (QC):  4


On/Off Footwear (QC):  5


Toileting(FIM):  5


Toileting Hygiene (QC):  4


Toilet/Commode Transfer(FIM):  5


Toilet/Commode Transfer (QC):  4


Shower Transfer(FIM):  5


Additional Goals:  1-Demonstrate ADL Tasks, 2-Verbalize Understanding, 3-

ImproveStrength/Cha


1=Demonstrate adherence to instructed precautions during ADL tasks.


2=Patient will verbalize/demonstrate understanding of assistive devices/

modifications for ADL.


3=Patient will improve strength/tolerance for activity to enable patient to 

perform ADL's.





OT Education/Plan


Problem List/Assessment


Pt to benefit from skilled OT intervention for ADL training, transfers, 

strengthening, and safety education to improve level of function and allow safe 

discharge plan.





Discharge Recommendations


Plan/Recommendations:  Continue POC





Treatment Plan/Plan of Care


Patient would benefit from OT for education, treatment and training to promote 

independence in ADL's, mobility, safety and/or upper extremity function for ADL'

s.


Plan of Care:  ADL Retraining, Functional Mobility, Group Exercise/Act as Ind, 

UE Funct Exercise/Act, UE Neuromus Re-Ed/Coord


Treatment Duration:  May 30, 2017


Visits Per Week:  10-12


Minutes/Day (M-F):  60-90


Minutes/Day (Sat/[place]):  PRN


Agreement:  Yes


Rehab Potential:  Fair





Time/GCodes


Start Time:  09:00


Stop Time:  10:30


Total Time Billed (hr/min):  90


Billed Treatment Time


1 visit, ADLx4(60minutes), FA(15minutes), EX(15minutes)











FAUSTINO PATE OT May 19, 2017 13:03

## 2017-05-20 VITALS — SYSTOLIC BLOOD PRESSURE: 123 MMHG | DIASTOLIC BLOOD PRESSURE: 69 MMHG

## 2017-05-20 VITALS — SYSTOLIC BLOOD PRESSURE: 144 MMHG | DIASTOLIC BLOOD PRESSURE: 83 MMHG

## 2017-05-20 RX ADMIN — NYSTATIN SCH GM: 100000 CREAM TOPICAL at 20:05

## 2017-05-20 RX ADMIN — Medication SCH MG: at 20:04

## 2017-05-20 RX ADMIN — TROSPIUM CHLORIDE SCH MG: 20 TABLET, FILM COATED ORAL at 16:37

## 2017-05-20 RX ADMIN — METFORMIN HYDROCHLORIDE SCH MG: 500 TABLET, FILM COATED ORAL at 05:53

## 2017-05-20 RX ADMIN — ROPINIROLE SCH MG: 1 TABLET ORAL at 20:04

## 2017-05-20 RX ADMIN — DOCUSATE SODIUM SCH MG: 100 CAPSULE ORAL at 20:04

## 2017-05-20 RX ADMIN — METOPROLOL SUCCINATE SCH MG: 50 TABLET, EXTENDED RELEASE ORAL at 08:11

## 2017-05-20 RX ADMIN — INSULIN ASPART SCH UNIT: 100 INJECTION, SOLUTION INTRAVENOUS; SUBCUTANEOUS at 11:43

## 2017-05-20 RX ADMIN — TROSPIUM CHLORIDE SCH MG: 20 TABLET, FILM COATED ORAL at 05:53

## 2017-05-20 RX ADMIN — Medication SCH MG: at 08:11

## 2017-05-20 RX ADMIN — OLANZAPINE SCH MG: 2.5 TABLET, FILM COATED ORAL at 08:10

## 2017-05-20 RX ADMIN — NYSTATIN SCH GM: 100000 CREAM TOPICAL at 09:21

## 2017-05-20 RX ADMIN — INSULIN ASPART SCH UNIT: 100 INJECTION, SOLUTION INTRAVENOUS; SUBCUTANEOUS at 05:56

## 2017-05-20 RX ADMIN — METFORMIN HYDROCHLORIDE SCH MG: 500 TABLET, FILM COATED ORAL at 17:47

## 2017-05-20 RX ADMIN — PREGABALIN SCH MG: 75 CAPSULE ORAL at 08:13

## 2017-05-20 RX ADMIN — INSULIN ASPART SCH UNIT: 100 INJECTION, SOLUTION INTRAVENOUS; SUBCUTANEOUS at 20:51

## 2017-05-20 RX ADMIN — NYSTATIN SCH GM: 100000 CREAM TOPICAL at 13:23

## 2017-05-20 RX ADMIN — ROPINIROLE SCH MG: 1 TABLET ORAL at 08:10

## 2017-05-20 RX ADMIN — PANTOPRAZOLE SODIUM SCH MG: 40 TABLET, DELAYED RELEASE ORAL at 05:53

## 2017-05-20 RX ADMIN — PREGABALIN SCH MG: 75 CAPSULE ORAL at 20:04

## 2017-05-20 RX ADMIN — INSULIN ASPART SCH UNIT: 100 INJECTION, SOLUTION INTRAVENOUS; SUBCUTANEOUS at 16:27

## 2017-05-20 RX ADMIN — DULOXETINE HYDROCHLORIDE SCH MG: 30 CAPSULE, DELAYED RELEASE ORAL at 08:10

## 2017-05-20 RX ADMIN — LORATADINE SCH MG: 10 TABLET ORAL at 08:11

## 2017-05-20 RX ADMIN — PREGABALIN SCH MG: 75 CAPSULE ORAL at 13:22

## 2017-05-20 RX ADMIN — ENOXAPARIN SODIUM SCH MG: 100 INJECTION SUBCUTANEOUS at 08:14

## 2017-05-20 RX ADMIN — FUROSEMIDE SCH MG: 40 TABLET ORAL at 08:10

## 2017-05-20 RX ADMIN — ATORVASTATIN CALCIUM SCH MG: 20 TABLET, FILM COATED ORAL at 20:04

## 2017-05-20 NOTE — PHYSICAL THERAPY DAILY NOTE
PT Daily Note-Current


Subjective


Pt. up in chair receiving morning meds upon arrival, agrees to therapy.  Pt. 

reports of back pain to the nurse, no objective pain rating given.





Mental Status


Patient Orientation:  Normal For Age





Transfers


              Functional [place] Measure


0=Not Assessed/NA   4=Minimal Assistance


1=Total Assistance   5=Supervision or Setup


2=Maximal Assistance   6=Modified [place]


3=Moderate Assistance   7=Complete IndependenceIRFPAI Quality Coding Scale











6 Independent with activity with or without an assistive device


 


5  Patient requires set up or clean up by helper.  Patient completes activity  

by  themselves


 


4 Supervision or touching assist (CGA). [place] provide cues , steadying assist


 


3 The helper provides less than half the effort to complete the activity


 


2 The helper provides more than half the effort to complete the activity


 


1 Dependent.  The helper does all the effort to complete an activity 


 


7 Patient refused to complete or attempt activity


 


9 The patient did not perform the activity before the current illness or injury


 


88 Not attempted due to Medical conditions or safety concerns








Transfers (B, C, W/C) (FIM):  6


Sit to/from Stand:  6





Gait Training


Does the Patient Walk?:  Yes


Gait (FIM):  6


Distance (FIM):  3=150 ft


Distance:  2 x 150 ft


Gait Level of Assist:  6


Gait Assistive Device:  FWW


pt. frequently stops to talk during ambulation, needs cues to continue 

ambulation





Stair Training


 Stair Training: Handrails/:  2 handrails


Stairs (FIM):  2


#of Steps:  4


Level of Assist:  4


cues for sequence throughout stair training





Exercises


NuStep Minutes:  10


 NuStep Workload:  3





Treatments


gait, Nustep, stair training





Assessment


Current Status:  Good Progress


1, FA 30', Ex 10'





PT Short Term Goals


Short Term Goals


Time Frame:  May 23, 2017


Transfers (B,C,W/C) (FIM):  3


Gait (FIM):  1


Distance (FIM):  1=up to 49 ft


Gait Distance Comment:  20'


Gait Level of Assist:  3


Gait Assistive Device:  FWW


Stairs (FIM):  1


# of Steps:  1


Stairs Level of Assist:  3





PT Long Term Goals


Long Term Goals


PT Long Term Goals Time Frame:  Jun 6, 2017


Transfers (B,C,W/C) (FIM):  5


Sit to Lying (QC):  5


Lying-Sitting on Side/Bed(QC):  5


Sit to Stand (QC):  5


Rolling:  3


Roll Left to Right (QC):  5


Chair/Bed-to-Chair Xfer(QC):  5


Car Transfer (QC):  5


Does the Patient Walk:  No and Walking Goal IS indicated


Gait (FIM):  2


Gait distance (FIM):  0=472-66 ft


Distance:  75'


Walk 10 feet (QC):  5


Walk 10ft-Uneven Surface(QC):  5


Walk 50ft with 2 Turns (QC):  5


Walk 150 ft (QC):  88


Gait Level of Assist:  5


Gait Assistive Device:  FWW


# of Steps:  4


1 Step (curb) (QC):  4


4 Steps (QC):  4


12 Steps (QC):  88


Stairs Level Of Assist:  5


Picking up an Object (QC):  4





PT Plan


Treatment/Plan


Treatment Plan:  Continue Plan of Care


Treatment Plan:  Bed Mobility, Education, Functional Activity Cha, Group 

Therapy, Gait, Safety, Therapeutic Exercise, Transfers


Treatment Duration:  Jun 6, 2017


Visits Per Week:  6-11


Minutes/Day (M-F):  60-90


Minutes/Day (Sat/[place]):  PRN





Time/GCodes


Time In:  810


Time Out:  850


Total Billed Treatment Time:  40


Total Billed Treatment


1, FA 30', Ex 10'











LUANA VAUGHAN PT May 20, 2017 11:24

## 2017-05-21 VITALS — SYSTOLIC BLOOD PRESSURE: 114 MMHG | DIASTOLIC BLOOD PRESSURE: 67 MMHG

## 2017-05-21 VITALS — DIASTOLIC BLOOD PRESSURE: 67 MMHG | SYSTOLIC BLOOD PRESSURE: 113 MMHG

## 2017-05-21 RX ADMIN — ATORVASTATIN CALCIUM SCH MG: 20 TABLET, FILM COATED ORAL at 21:14

## 2017-05-21 RX ADMIN — ROPINIROLE SCH MG: 1 TABLET ORAL at 21:13

## 2017-05-21 RX ADMIN — METFORMIN HYDROCHLORIDE SCH MG: 500 TABLET, FILM COATED ORAL at 06:16

## 2017-05-21 RX ADMIN — LORATADINE SCH MG: 10 TABLET ORAL at 08:08

## 2017-05-21 RX ADMIN — NYSTATIN SCH GM: 100000 CREAM TOPICAL at 13:17

## 2017-05-21 RX ADMIN — NYSTATIN SCH GM: 100000 CREAM TOPICAL at 08:09

## 2017-05-21 RX ADMIN — ENOXAPARIN SODIUM SCH MG: 100 INJECTION SUBCUTANEOUS at 08:08

## 2017-05-21 RX ADMIN — DOCUSATE SODIUM SCH MG: 100 CAPSULE ORAL at 21:13

## 2017-05-21 RX ADMIN — PREGABALIN SCH MG: 75 CAPSULE ORAL at 08:08

## 2017-05-21 RX ADMIN — TROSPIUM CHLORIDE SCH MG: 20 TABLET, FILM COATED ORAL at 06:17

## 2017-05-21 RX ADMIN — METOPROLOL SUCCINATE SCH MG: 50 TABLET, EXTENDED RELEASE ORAL at 08:08

## 2017-05-21 RX ADMIN — DULOXETINE HYDROCHLORIDE SCH MG: 30 CAPSULE, DELAYED RELEASE ORAL at 08:08

## 2017-05-21 RX ADMIN — NYSTATIN SCH GM: 100000 CREAM TOPICAL at 21:14

## 2017-05-21 RX ADMIN — PANTOPRAZOLE SODIUM SCH MG: 40 TABLET, DELAYED RELEASE ORAL at 06:16

## 2017-05-21 RX ADMIN — Medication SCH MG: at 21:14

## 2017-05-21 RX ADMIN — INSULIN ASPART SCH UNIT: 100 INJECTION, SOLUTION INTRAVENOUS; SUBCUTANEOUS at 16:26

## 2017-05-21 RX ADMIN — INSULIN ASPART SCH UNIT: 100 INJECTION, SOLUTION INTRAVENOUS; SUBCUTANEOUS at 06:16

## 2017-05-21 RX ADMIN — DOCUSATE SODIUM SCH MG: 100 CAPSULE ORAL at 08:08

## 2017-05-21 RX ADMIN — INSULIN ASPART SCH UNIT: 100 INJECTION, SOLUTION INTRAVENOUS; SUBCUTANEOUS at 11:32

## 2017-05-21 RX ADMIN — Medication SCH MG: at 08:08

## 2017-05-21 RX ADMIN — INSULIN ASPART SCH UNIT: 100 INJECTION, SOLUTION INTRAVENOUS; SUBCUTANEOUS at 21:20

## 2017-05-21 RX ADMIN — PREGABALIN SCH MG: 75 CAPSULE ORAL at 13:16

## 2017-05-21 RX ADMIN — ROPINIROLE SCH MG: 1 TABLET ORAL at 08:08

## 2017-05-21 RX ADMIN — FUROSEMIDE SCH MG: 40 TABLET ORAL at 08:08

## 2017-05-21 RX ADMIN — METFORMIN HYDROCHLORIDE SCH MG: 500 TABLET, FILM COATED ORAL at 16:26

## 2017-05-21 RX ADMIN — TROSPIUM CHLORIDE SCH MG: 20 TABLET, FILM COATED ORAL at 16:26

## 2017-05-21 RX ADMIN — PREGABALIN SCH MG: 75 CAPSULE ORAL at 21:13

## 2017-05-21 RX ADMIN — OLANZAPINE SCH MG: 2.5 TABLET, FILM COATED ORAL at 08:08

## 2017-05-22 VITALS — DIASTOLIC BLOOD PRESSURE: 74 MMHG | SYSTOLIC BLOOD PRESSURE: 134 MMHG

## 2017-05-22 VITALS — SYSTOLIC BLOOD PRESSURE: 121 MMHG | DIASTOLIC BLOOD PRESSURE: 71 MMHG

## 2017-05-22 RX ADMIN — DOCUSATE SODIUM SCH MG: 100 CAPSULE ORAL at 08:42

## 2017-05-22 RX ADMIN — DOCUSATE SODIUM SCH MG: 100 CAPSULE ORAL at 20:16

## 2017-05-22 RX ADMIN — FUROSEMIDE SCH MG: 40 TABLET ORAL at 08:42

## 2017-05-22 RX ADMIN — PANTOPRAZOLE SODIUM SCH MG: 40 TABLET, DELAYED RELEASE ORAL at 05:56

## 2017-05-22 RX ADMIN — INSULIN ASPART SCH UNIT: 100 INJECTION, SOLUTION INTRAVENOUS; SUBCUTANEOUS at 05:16

## 2017-05-22 RX ADMIN — DULOXETINE HYDROCHLORIDE SCH MG: 30 CAPSULE, DELAYED RELEASE ORAL at 08:42

## 2017-05-22 RX ADMIN — NYSTATIN SCH GM: 100000 CREAM TOPICAL at 08:43

## 2017-05-22 RX ADMIN — INSULIN ASPART SCH UNIT: 100 INJECTION, SOLUTION INTRAVENOUS; SUBCUTANEOUS at 20:40

## 2017-05-22 RX ADMIN — INSULIN ASPART SCH UNIT: 100 INJECTION, SOLUTION INTRAVENOUS; SUBCUTANEOUS at 11:18

## 2017-05-22 RX ADMIN — NYSTATIN SCH GM: 100000 CREAM TOPICAL at 20:15

## 2017-05-22 RX ADMIN — ATORVASTATIN CALCIUM SCH MG: 20 TABLET, FILM COATED ORAL at 20:14

## 2017-05-22 RX ADMIN — INSULIN ASPART SCH UNIT: 100 INJECTION, SOLUTION INTRAVENOUS; SUBCUTANEOUS at 16:13

## 2017-05-22 RX ADMIN — METFORMIN HYDROCHLORIDE SCH MG: 500 TABLET, FILM COATED ORAL at 16:20

## 2017-05-22 RX ADMIN — ROPINIROLE SCH MG: 1 TABLET ORAL at 20:14

## 2017-05-22 RX ADMIN — PREGABALIN SCH MG: 75 CAPSULE ORAL at 13:08

## 2017-05-22 RX ADMIN — OLANZAPINE SCH MG: 2.5 TABLET, FILM COATED ORAL at 08:42

## 2017-05-22 RX ADMIN — ENOXAPARIN SODIUM SCH MG: 100 INJECTION SUBCUTANEOUS at 08:42

## 2017-05-22 RX ADMIN — Medication SCH MG: at 20:14

## 2017-05-22 RX ADMIN — NYSTATIN SCH GM: 100000 CREAM TOPICAL at 13:08

## 2017-05-22 RX ADMIN — TROSPIUM CHLORIDE SCH MG: 20 TABLET, FILM COATED ORAL at 05:56

## 2017-05-22 RX ADMIN — METFORMIN HYDROCHLORIDE SCH MG: 500 TABLET, FILM COATED ORAL at 05:57

## 2017-05-22 RX ADMIN — TROSPIUM CHLORIDE SCH MG: 20 TABLET, FILM COATED ORAL at 16:20

## 2017-05-22 RX ADMIN — METOPROLOL SUCCINATE SCH MG: 50 TABLET, EXTENDED RELEASE ORAL at 08:42

## 2017-05-22 RX ADMIN — PREGABALIN SCH MG: 75 CAPSULE ORAL at 08:42

## 2017-05-22 RX ADMIN — LORATADINE SCH MG: 10 TABLET ORAL at 08:42

## 2017-05-22 RX ADMIN — ROPINIROLE SCH MG: 1 TABLET ORAL at 08:43

## 2017-05-22 RX ADMIN — Medication SCH MG: at 08:42

## 2017-05-22 RX ADMIN — PREGABALIN SCH MG: 75 CAPSULE ORAL at 20:14

## 2017-05-22 NOTE — OCCUPATIONAL THER DAILY NOTE
OT Current Status-Daily Note


Subjective


No pain reported.





Appearance


Pt. up in chair.  Agrees to shower.





Mental Status/Objective


Patient Orientation:  Person, Place


              Functional [place] Measure


0=Not Assessed/NA   4=Minimal Assistance


1=Total Assistance   5=Supervision or Setup


2=Maximal Assistance   6=Modified [place]


3=Moderate Assistance   7=Complete [place]





ADL-Treatment


              Functional [place] Measure


0=Not Assessed/NA   4=Minimal Assistance


1=Total Assistance   5=Supervision or Setup


2=Maximal Assistance   6=Modified [place]


3=Moderate Assistance   7=Complete IndependenceIRFPAI Quality Coding Scale











6 Independent with activity with or without an assistive device


 


5  Patient requires set up or clean up by helper.  Patient completes activity  

by  themselves


 


4 Supervision or touching assist (CGA). [place] provide cues , steadying assist


 


3 The helper provides less than half the effort to complete the activity


 


2 The helper provides more than half the effort to complete the activity


 


1 Dependent.  The helper does all the effort to complete an activity 


 


7 Patient refused to complete or attempt activity


 


9 The patient did not perform the activity before the current illness or injury


 


88 Not attempted due to Medical conditions or safety concerns








Grooming (FIM):  5 (set up with brush.)


Oral Hygiene (QC):  5


Bathing (FIM):  5 (SBA in shower.)


Shower/Bathe Self (QC):  4


Upper Body (FIM):  4 (Pt. requires assistance to find holes of arms and to 

manipulate shirt.  States that her vision is low, and that she has neuropathy 

in hands, and that is why it is difficult.)


Upper Body Dressing (QC):  4


Lower Body Dressing (FIM):  3 (Pt. is unable to find the hole of her brief, or 

don socks.  Requires increased time, and blames her neuorapathy and vision.)


Lower Body Dressing (QC):  3


On/Off Footwear (QC):  2


Toileting (FIM):  5 (SBA.)


Toileting Hygiene (QC):  5


Transfers (B, C, W/C) (FIM):  5


Toilet/Commode Transfer (FIM):  5


Toilet Transfer (QC):  4


Shower Transfer(FIM):  5





Other Treatment


Pt. requires constant cues to stay on task and sequence activities.  Pt. 

continues to talk throughout treatment and forgets what she is doing.  Has 

difficulty managing clothing, and is unable to don shirt correctly over arms or 

get her feet into her brief.  Pt. demonstrates poor safety awareness, and still 

reports that nursing keeps her walker across the room so she can't get up on 

her own, and she doesn't know why.  Pt. requires cues for safety to keep the 

walker in front of her when ambulating.  Pt. has chair alarm on and call light 

within reach after ADLs.  All other needs met in room as well.





Education


OT Patient Education:  Correct positioning, Energy conservation, Modified ADL 

techniques, Progress toward Goal/Update tx plan, Purpose of tx/functional 

activities, Reviewed precautions, Rehab process, Safety issues, Transfer 

techniques, Use of adapted equipment


Teaching Recipient:  Patient


Teaching Methods:  Demonstration, Discussion


Response to Teaching:  Verbalize Understanding, Return Demonstration





OT Short Term Goals


Short Term Goals


Time Frame:  May 16, 2017


Grooming(FIM):  5


Upper Body Dressing(FIM):  4


Lower Body Dressing(FIM):  3


Transfers (B,C,W/C) (FIM):  3


Toilet/Commode Transfer(FIM):  3


Additional Short Term Goals:  1-Demonstrate ADL Tasks, 2-Verbalize Understanding

, 3-ImproveStrength/Cha


1=Demonstrate adherence to instructed precautions during ADL tasks.


2=Patient will verbalize/demonstrate understanding of assistive devices/

modifications for ADL.


3=Patient will improve strength/tolerance for activity to enable patient to 

perform ADL's.





OT Long Term Goals


Long Term Goals


Time Frame:  May 30, 2017


Eating (FIM):  6


Eating (QC):  6


Groomin


Bathing(FIM):  5


Shower/Bathe Self (QC):  4


Upper Body Dressing(FIM):  5


Upper Body Dressing (QC):  4


Lower Body Dressing(FIM):  5


Lower Body Dressing (QC):  4


On/Off Footwear (QC):  5


Toileting(FIM):  5


Toileting Hygiene (QC):  4


Toilet/Commode Transfer(FIM):  5


Toilet/Commode Transfer (QC):  4


Shower Transfer(FIM):  5


Additional Goals:  1-Demonstrate ADL Tasks, 2-Verbalize Understanding, 3-

ImproveStrength/Cha


1=Demonstrate adherence to instructed precautions during ADL tasks.


2=Patient will verbalize/demonstrate understanding of assistive devices/

modifications for ADL.


3=Patient will improve strength/tolerance for activity to enable patient to 

perform ADL's.





OT Education/Plan


Problem List/Assessment


Assessment:  Decreased Activ Tolerance, Decreased Safety Aware, Decreased UE 

Strength, Dependent Transfers, Impaired Cognition, Impaired Coordination, 

Impaired Funct Balance, Impaired I ADL's, Impaired Self-Care Skills


Pt to benefit from skilled OT intervention for ADL training, transfers, 

strengthening, and safety education to improve level of function and allow safe 

discharge plan.





Discharge Recommendations


Plan/Recommendations:  Continue POC


Therapy D/C Recommendations:  Home w/ Family Support, Occupational Therapy Home 

Care, Scheduled Assistance





Treatment Plan/Plan of Care


Treatment,Training & Education:  Yes


Patient would benefit from OT for education, treatment and training to promote 

independence in ADL's, mobility, safety and/or upper extremity function for ADL'

s.


Plan of Care:  ADL Retraining, Functional Mobility, Group Exercise/Act as Ind, 

UE Funct Exercise/Act, UE Neuromus Re-Ed/Coord


Treatment Duration:  May 30, 2017


Visits Per Week:  10-12


Minutes/Day (M-F):  60-90


Minutes/Day (Sat/[place]):  PRN


Agreement:  Yes


Rehab Potential:  Fair





Time/GCodes


Start Time:  09:30


Stop Time:  11:00


Total Time Billed (hr/min):  90


Billed Treatment Time


1, ADL x 6











MIKAELA BARBER OT May 22, 2017 11:56

## 2017-05-22 NOTE — PROGRESS NOTE (SOAP)
Subjective


Subjective/Events-last exam


general debility.


Diabetes.


Patient has improved.


Patient needs a railing at home so can go down steps





Objective


Exam





Vital Signs








  Date Time  Temp Pulse Resp B/P (MAP) Pulse Ox O2 Delivery O2 Flow Rate FiO2


 


17 04:00 97.6 79 16 121/71 95   


 


17 18:17 97.8 75 16 113/67 100   














I & O 


 


 17





 07:00


 


Intake Total 2400 ml


 


Balance 2400 ml





Capillary Refill : Less Than 3 Seconds


General Appearance:  No Apparent Distress, WD/WN


HEENT:  Normal ENT Inspection


Extremity:  Normal Capillary Refill, Normal Inspection, Normal Range of Motion, 

Non Tender, No Calf Tenderness, No Pedal Edema, Calf Tenderness, Inflammation, 

Pedal Edema, Pelvis Stable, Slow Capillary Refill, Swelling, Other





Results


Lab


Laboratory Tests


17 11:15: Glucometer 135H


17 16:08: Glucometer 150H


17 21:20: Glucometer 127H


17 04:29: Glucometer 106





Microbiology


5/10/17 Urine Culture - Final, Complete


          NO GROWTH





Assessment/Plan


Assessment/Plan


Assess & Plan/Chief Complaint


general debility.


Foot ulcers good.


Diabetes 


2 person assist.


.


17.


General debility.


Diabetes.


Patient improving.


.


55/15/70.


General debility.


Confusion


Diabetes..


.


.


General debility.


Diabetes.


Hallucinations.


Patient working progress.


.


17.


General debility.


Diabetes.


Patient voices no complaints.


Patient feels she is improving.


.


17 generalized debility.


 diabetes.


Patient improving.


Patient having neck and back pain lower..


.


17 generalized debility.


Diabetes.


Patient getting around better.


.


17.


General debility.


Diabetes.


Patient to be discharged tomorrow





Clinical Quality Measures


DVT/VTE Risk/Contraindication:


Risk Factor Score Per Nursin


RFS Level Per Nursing on Admit:  4+=Very High











LELAND DACOSTA DO May 22, 2017 08:09

## 2017-05-22 NOTE — PM & R (SOAP) PROGRESS NOTE
Subjective


Subjective/Events-last exam


Patient was seen in her room this AM Disacussed case with RN and with SW 

[facility] sullivan P[facility] Current labs and Therapy notes reviewed.Patient Modified 

Independent for transfers and Gailt





Objective


Exam


Last Set of Vital Signs





Vital Signs








  Date Time  Temp Pulse Resp B/P (MAP) Pulse Ox O2 Delivery O2 Flow Rate FiO2


 


5/22/17 04:00 97.6 79 16 121/71 95   





Capillary Refill : Less Than 3 Seconds


I&O











Intake and Output 


 


 5/22/17





 00:00


 


Intake Total 2300 ml


 


Balance 2300 ml


 


 


 


Intake Oral 2300 ml


 


# Voids 11


 


# Bowel Movements 1








General:  Alert, Oriented X3, Cooperative, No Acute Distress


HEENT:  Atraumatic, PERRLA, EOMI, Mucous Memb Moist/Pink


Neck:  Supple, No JVD


Lungs:  Clear to Auscultation, Normal Air Movement


Heart:  Regular Rate


Abdomen:  Normal Bowel Sounds, Soft, No Tenderness


Extremities:  Other (chronic dermatitis and edema)


Skin:  Other (Bilateral calf dermatitis with markedly reduced edema. Bilateral 

foot ulcers with scab formation.)


Neuro:  Other (generalized weakness)





Results


Lab


Laboratory Tests


5/19/17 11:07: Glucometer 164H


5/19/17 16:24: Glucometer 134H


5/19/17 20:39: Glucometer 133H


5/20/17 05:55: Glucometer 119H


5/20/17 11:41: Glucometer 111H


5/20/17 16:14: Glucometer 116H


5/20/17 20:47: Glucometer 186H


5/21/17 05:54: Glucometer 107


5/21/17 11:15: Glucometer 135H


5/21/17 16:08: Glucometer 150H


5/21/17 21:20: Glucometer 127H


5/22/17 04:29: Glucometer 106





Microbiology


5/10/17 Urine Culture - Final, Complete


          NO GROWTH





Assessment/Plan


Assessment


General debil


DM


Chronic stasis dermatitis


Anxiety/depression/psychosis?





Plan


Continue PT/OT


Trend accuchecks and adjust meds as needed


Discharge set for tomorrow to home with [facility]


F/U with DR Sullivan


See orders











ZHANG RODAS MD May 22, 2017 09:28

## 2017-05-22 NOTE — PHYSICAL THERAPY DAILY NOTE
PT Daily Note-Current


Subjective


Patient is very agreeable to participate with PT.  No c/o at this time.





Pain





   Numeric Pain Scale:  5-Moderate Pain


   Location:  Left


   Location Body Site:  Ankle


   Pain Description:  Chronic





Appearance


bilateral LE edema (chronic)





Mental Status


Patient Orientation:  Normal For Age





Transfers


              Functional [place] Measure


0=Not Assessed/NA   4=Minimal Assistance


1=Total Assistance   5=Supervision or Setup


2=Maximal Assistance   6=Modified [place]


3=Moderate Assistance   7=Complete IndependenceIRFPAI Quality Coding Scale











6 Independent with activity with or without an assistive device


 


5  Patient requires set up or clean up by helper.  Patient completes activity  

by  themselves


 


4 Supervision or touching assist (CGA). [place] provide cues , steadying assist


 


3 The helper provides less than half the effort to complete the activity


 


2 The helper provides more than half the effort to complete the activity


 


1 Dependent.  The helper does all the effort to complete an activity 


 


7 Patient refused to complete or attempt activity


 


9 The patient did not perform the activity before the current illness or injury


 


88 Not attempted due to Medical conditions or safety concerns








Transfers (B, C, W/C) (FIM):  6


Scootin


Supine to/from Sit:  6


Sit to/from Stand:  6


Sit to Lying (QC):  5


Sit to Stand (QC):  5





Exercises


Supine Ex:  Ankle pumps, Quad Set, Heel Slides, Short Arc Quads, Straight leg 

raise


Supine Reps:  15 (SLR AAROM)


Seated Therapy Exercises:  Ankle pumps, Long arc quads, Hip flexion


Seated Reps:  25 (x 2 sets)





Assessment


Patient requires time to complete all functional tasks.  Plan dismissal 

tomorrow to home.





PT Short Term Goals


Short Term Goals


Time Frame:  May 23, 2017


Transfers (B,C,W/C) (FIM):  3


Gait (FIM):  1


Distance (FIM):  1=up to 49 ft


Gait Distance Comment:  20'


Gait Level of Assist:  3


Gait Assistive Device:  FWW


Stairs (FIM):  1


# of Steps:  1


Stairs Level of Assist:  3





PT Long Term Goals


Long Term Goals


PT Long Term Goals Time Frame:  2017


Transfers (B,C,W/C) (FIM):  5 (met 17)


Sit to Lying (QC):  5 (met 17)


Lying-Sitting on Side/Bed(QC):  5 (met 17)


Sit to Stand (QC):  5 (met 17)


Rolling:  3 (met 17)


Roll Left to Right (QC):  5 (met 17)


Chair/Bed-to-Chair Xfer(QC):  5 (met 17)


Car Transfer (QC):  5 (met 17)


Does the Patient Walk:  No and Walking Goal IS indicated


Gait (FIM):  2 (met 17)


Gait distance (FIM):  9=733-57 ft


Distance:  75'


Walk 10 feet (QC):  5 (met 17)


Walk 10ft-Uneven Surface(QC):  5 (met 17)


Walk 50ft with 2 Turns (QC):  5 (met)


Walk 150 ft (QC):  88 (met 17)


Gait Level of Assist:  5 (met 17)


Gait Assistive Device:  FWW


# of Steps:  4 (met 2217)


1 Step (curb) (QC):  4 (met 17)


4 Steps (QC):  4 (met 17)


12 Steps (QC):  88 (met 17)


Stairs Level Of Assist:  5 (met 17)


Picking up an Object (QC):  4 (met 17)





PT Plan


Treatment/Plan


Treatment Plan:  Continue Plan of Care


Treatment Plan:  Bed Mobility, Education, Functional Activity Cha, Group 

Therapy, Gait, Safety, Therapeutic Exercise, Transfers


Treatment Duration:  2017


Visits Per Week:  6-11


Minutes/Day (M-F):  60-90


Minutes/Day (Sat/[place]):  PRN





Time/GCodes


Time In:  1300


Time Out:  1330


Total Billed Treatment Time:  30


Total Billed Treatment


1 visit


EX x 2 30 min











DAR SCHMIDT PT May 22, 2017 14:12

## 2017-05-22 NOTE — PHYSICAL THERAPY DAILY NOTE
PT Daily Note-Current


Subjective


Patient agrees to PT.  She is very excited to return to home tomorrow.





Pain





   Numeric Pain Scale:  0-No Pain


   Location:  No Pain Reported





Mental Status


Patient Orientation:  Normal For Age





Transfers


              Functional [place] Measure


0=Not Assessed/NA   4=Minimal Assistance


1=Total Assistance   5=Supervision or Setup


2=Maximal Assistance   6=Modified [place]


3=Moderate Assistance   7=Complete [place]IRFPAI Quality Coding Scale











6 Independent with activity with or without an assistive device


 


5  Patient requires set up or clean up by helper.  Patient completes activity  

by  themselves


 


4 Supervision or touching assist (CGA). [place] provide cues , steadying assist


 


3 The helper provides less than half the effort to complete the activity


 


2 The helper provides more than half the effort to complete the activity


 


1 Dependent.  The helper does all the effort to complete an activity 


 


7 Patient refused to complete or attempt activity


 


9 The patient did not perform the activity before the current illness or injury


 


88 Not attempted due to Medical conditions or safety concerns








Transfers (B, C, W/C) (FIM):  6


Scootin


Rollin


Roll Left to Right (QC):  5


Supine to/from Sit:  6


Sit to/from Stand:  6


Sit to Lying (QC):  5


Sit to Stand (QC):  5


Chair/Bed-to-Chair Xfer(QC):  5


Bed to/from Chair:  6


Car Transfer (QC):  5





Gait Training


Does the Patient Walk?:  Yes


Gait (FIM):  6


Distance (FIM):  3=150 ft


Distance:  250' x 4


Walk 10 feet (QC):  5


Walk 50 ft with 2 Turns(QC):  5


Walk 150 ft (QC):  5


Walking 10ft/uneven surface-QC:  5


Gait Level of Assist:  6


Gait Assistive Device:  FWW


very, very slow, methodical, safe and functional





Stair Training


 Stair Training: Handrails/:  2 handrails


Stairs (FIM):  2


#of Steps:  4


1 Step (curb) (QC):  5


4 Steps (QC):  5


12 Steps (QC):  9


Stairs:  Pattern:  Step to


Level of Assist:  5





Balance


Picking up an Object (QC):  5


Special Test Comments


utilizes assistive device





Exercises


Supine Ex:  Ankle pumps, Quad Set


Supine Reps:  10


Seated Therapy Exercises:  Ankle pumps, Long arc quads, Shoulder Abd


Seated Reps:  15





Assessment


Patient require time to complete all functional tasks and redirection to remain 

on task.  Plan dismissal tomorrow with home health intervention.





PT Short Term Goals


Short Term Goals


Time Frame:  May 23, 2017


Transfers (B,C,W/C) (FIM):  3


Gait (FIM):  1


Distance (FIM):  1=up to 49 ft


Gait Distance Comment:  20'


Gait Level of Assist:  3


Gait Assistive Device:  FWW


Stairs (FIM):  1


# of Steps:  1


Stairs Level of Assist:  3





PT Long Term Goals


Long Term Goals


PT Long Term Goals Time Frame:  2017


Transfers (B,C,W/C) (FIM):  5 (met 17)


Sit to Lying (QC):  5 (met 17)


Lying-Sitting on Side/Bed(QC):  5 (met 17)


Sit to Stand (QC):  5 (met 17)


Rolling:  3 (met 17)


Roll Left to Right (QC):  5 (met 17)


Chair/Bed-to-Chair Xfer(QC):  5 (met 17)


Car Transfer (QC):  5 (met 17)


Does the Patient Walk:  No and Walking Goal IS indicated


Gait (FIM):  2 (met 17)


Gait distance (FIM):  0=621-99 ft


Distance:  75'


Walk 10 feet (QC):  5 (met 17)


Walk 10ft-Uneven Surface(QC):  5 (met 17)


Walk 50ft with 2 Turns (QC):  5 (met)


Walk 150 ft (QC):  88 (met 17)


Gait Level of Assist:  5 (met 17)


Gait Assistive Device:  FWW


# of Steps:  4 (met 2217)


1 Step (curb) (QC):  4 (met 17)


4 Steps (QC):  4 (met 17)


12 Steps (QC):  88 (met 17)


Stairs Level Of Assist:  5 (met 17)


Picking up an Object (QC):  4 (met 17)





PT Plan


Treatment/Plan


Treatment Plan:  Continue Plan of Care


Treatment Plan:  Bed Mobility, Education, Functional Activity Cha, Group 

Therapy, Gait, Safety, Therapeutic Exercise, Transfers


Treatment Duration:  2017


Visits Per Week:  6-11


Minutes/Day (M-F):  60-90


Minutes/Day (Sat/[place]):  PRN





Safety Risks/Education


Patient Education:  Safety Issues


Teaching Recipient:  Patient


Teaching Methods:  Discussion


Response to Teaching:  Verbalize Understanding





Discharge Recommendations


Therapy D/C Recommendations:  Physical Therapy Home Care





Time/GCodes


Time In:  1101


Time Out:  1201


Total Billed Treatment Time:  60


Total Billed Treatment


1 visit


GT x 2 35 min


EX x 2 25 min











DAR SCHMIDT PT May 22, 2017 11:51

## 2017-05-23 VITALS — DIASTOLIC BLOOD PRESSURE: 76 MMHG | SYSTOLIC BLOOD PRESSURE: 135 MMHG

## 2017-05-23 VITALS — DIASTOLIC BLOOD PRESSURE: 78 MMHG | SYSTOLIC BLOOD PRESSURE: 142 MMHG

## 2017-05-23 RX ADMIN — OLANZAPINE SCH MG: 2.5 TABLET, FILM COATED ORAL at 08:02

## 2017-05-23 RX ADMIN — PREGABALIN SCH MG: 75 CAPSULE ORAL at 12:29

## 2017-05-23 RX ADMIN — Medication SCH MG: at 08:02

## 2017-05-23 RX ADMIN — ROPINIROLE SCH MG: 1 TABLET ORAL at 08:01

## 2017-05-23 RX ADMIN — PANTOPRAZOLE SODIUM SCH MG: 40 TABLET, DELAYED RELEASE ORAL at 05:59

## 2017-05-23 RX ADMIN — TROSPIUM CHLORIDE SCH MG: 20 TABLET, FILM COATED ORAL at 16:08

## 2017-05-23 RX ADMIN — INSULIN ASPART SCH UNIT: 100 INJECTION, SOLUTION INTRAVENOUS; SUBCUTANEOUS at 16:03

## 2017-05-23 RX ADMIN — FUROSEMIDE SCH MG: 40 TABLET ORAL at 08:02

## 2017-05-23 RX ADMIN — DULOXETINE HYDROCHLORIDE SCH MG: 30 CAPSULE, DELAYED RELEASE ORAL at 08:01

## 2017-05-23 RX ADMIN — TROSPIUM CHLORIDE SCH MG: 20 TABLET, FILM COATED ORAL at 05:59

## 2017-05-23 RX ADMIN — ENOXAPARIN SODIUM SCH MG: 100 INJECTION SUBCUTANEOUS at 08:01

## 2017-05-23 RX ADMIN — DOCUSATE SODIUM SCH MG: 100 CAPSULE ORAL at 08:02

## 2017-05-23 RX ADMIN — INSULIN ASPART SCH UNIT: 100 INJECTION, SOLUTION INTRAVENOUS; SUBCUTANEOUS at 11:01

## 2017-05-23 RX ADMIN — METOPROLOL SUCCINATE SCH MG: 50 TABLET, EXTENDED RELEASE ORAL at 08:02

## 2017-05-23 RX ADMIN — NYSTATIN SCH GM: 100000 CREAM TOPICAL at 12:29

## 2017-05-23 RX ADMIN — INSULIN ASPART SCH UNIT: 100 INJECTION, SOLUTION INTRAVENOUS; SUBCUTANEOUS at 05:16

## 2017-05-23 RX ADMIN — METFORMIN HYDROCHLORIDE SCH MG: 500 TABLET, FILM COATED ORAL at 16:08

## 2017-05-23 RX ADMIN — METFORMIN HYDROCHLORIDE SCH MG: 500 TABLET, FILM COATED ORAL at 06:00

## 2017-05-23 RX ADMIN — PREGABALIN SCH MG: 75 CAPSULE ORAL at 08:01

## 2017-05-23 RX ADMIN — LORATADINE SCH MG: 10 TABLET ORAL at 08:02

## 2017-05-23 RX ADMIN — NYSTATIN SCH GM: 100000 CREAM TOPICAL at 08:02

## 2017-05-23 NOTE — PROGRESS NOTE (SOAP)
Subjective


Subjective/Events-last exam


debility.


Diabetes.


Hallucination.


Patient ready for discharge today..


Patient to have [facility]





Objective


Exam





Vital Signs








  Date Time  Temp Pulse Resp B/P (MAP) Pulse Ox O2 Delivery O2 Flow Rate FiO2


 


17 05:13 97.8 82 16 142/78 95   


 


17 18:20 97.8 78 18 134/74 94   














I & O 


 


 17





 07:00


 


Intake Total 1910 ml


 


Balance 1910 ml





Capillary Refill : Less Than 3 Seconds


General Appearance:  No Apparent Distress, WD/WN


Respiratory:  No Accessory Muscle Use, No Respiratory Distress





Results


Lab


Laboratory Tests


17 10:57: Glucometer 103


17 16:08: Glucometer 129H


17 20:21: Glucometer 121H


17 04:54: Glucometer 118H





Microbiology


5/10/17 Urine Culture - Final, Complete


          NO GROWTH





Assessment/Plan


Assessment/Plan


Assess & Plan/Chief Complaint


general debility.


Foot ulcers good.


Diabetes 


2 person assist.


.


17.


General debility.


Diabetes.


Patient improving.


.


55/15/70.


General debility.


Confusion


Diabetes..


.


.


General debility.


Diabetes.


Hallucinations.


Patient working progress.


.


17.


General debility.


Diabetes.


Patient voices no complaints.


Patient feels she is improving.


.


17 generalized debility.


 diabetes.


Patient improving.


Patient having neck and back pain lower..


.


17 generalized debility.


Diabetes.


Patient getting around better.


.


17.


General debility.


Diabetes.


Patient to be discharged tomorrow.


.


17.


General debility.


Diabetes.


Patient to be discharged today.


Patient of [facility]





Clinical Quality Measures


DVT/VTE Risk/Contraindication:


Risk Factor Score Per Nursin


RFS Level Per Nursing on Admit:  4+=Very High











LELAND DACOSTA DO May 23, 2017 08:12

## 2017-05-23 NOTE — THERAPY TEAM DISCHARGE SUMMARY
Therapy Discharge Summary


Discharge Recommendations


Date of Discharge





Therapy D/C Recommendations:  Home w/ Family Support, Occupational Therapy Home 

Care, Physical Therapy Home Care, Scheduled Assistance





Occupational Therapy


Pt admitted to ARU with AMS/UTI. On admission pt required mod assist with 

bathing and lower body dressing and min assist with transfers, grooming, and 

upper body dressing. Skilled OT intervention focused on ADL training, transfers

, strengthening, and safety education. Pt made progress with therapy and by 

discharge is completing transfers, toileting, grooming, and bathing with SBA; 

upper body dressing with min assist and lower body dressing with mod assist. Pt 

met goals for eating, bathing, toileting, and transfers, but did not meet other 

goals. Pt discharging home this date with [facility]. D/C ARU OT at this time.





PT Long Term Goals


Long Term Goals


PT Long Term Goals Time Frame:  Jun 6, 2017


Transfers (B,C,W/C) (FIM):  5 (met 5/22/17)


Roll Left to Right (QC):  5 (met 5/22/17)


Sit to Lying (QC):  5 (met 5/22/17)


Lying-Sitting on Side/Bed(QC):  5 (met 5/22/17)


Sit to Stand (QC):  5 (met 5/22/17)


Chair/Bed-to-Chair Xfer(QC):  5 (met 5/22/17)


Car Transfer (QC):  5 (met 5/22/17)


Does the Patient Walk:  No and Walking Goal IS indicated


Gait (FIM):  2 (met 5/22/17)


Gait distance (FIM):  9=071-03 ft


Distance:  75'


Walk 10 feet (QC):  5 (met 5/22/17)


Walk 10ft-Uneven Surface(QC):  5 (met 5/22/17)


Walk 50ft with 2 Turns (QC):  5 (met)


Walk 150 ft (QC):  88 (met 5/22/17)


Gait Level of Assist:  5 (met 5/22/17)


Gait Assistive Device:  FWW


# of Steps:  4 (met 5/2217)


1 Step (curb) (QC):  4 (met 5/22/17)


4 Steps (QC):  4 (met 5/2/17)


12 Steps (QC):  88 (met 5/22/17)


Stairs Level Of Assist:  5 (met 5/22/17)


Picking up an Object (QC):  4 (met 5/22/17)





OT Long Term Goals


Long Term Goals


Time Frame:  May 30, 2017


Eating (FIM):  6


Eating (QC):  6


Grooming(FIM):  6


Bathing(FIM):  5


Shower/Bathe Self (QC):  4


Upper Body Dressing(FIM):  5


Upper Body Dressing (QC):  4


Lower Body Dressing(FIM):  5


Lower Body Dressing (QC):  4


On/Off Footwear (QC):  5


Toileting(FIM):  5


Toileting Hygiene (QC):  4


Toilet/Commode Transfer(FIM):  5


Toilet/Commode Transfer (QC):  4


Shower Transfer(FIM):  5


Additional Goals:  1-Demonstrate ADL Tasks, 2-Verbalize Understanding, 3-

ImproveStrength/Cha


1=Demonstrate adherence to instructed precautions during ADL tasks.


2=Patient will verbalize/demonstrate understanding of assistive devices/

modifications for ADL.


3=Patient will improve strength/tolerance for activity to enable patient to 

perform ADL's.











FAUSTINO PATE OT May 23, 2017 13:19

## 2017-05-23 NOTE — THERAPY TEAM DISCHARGE SUMMARY
Therapy Discharge Summary


Discharge Recommendations


Date of Discharge





Therapy D/C Recommendations:  Home w/ Family Support, Occupational Therapy Home 

Care, Physical Therapy Home Care, Scheduled Assistance





Physical Therapy


Patient to dismiss to home with home health intervention to ensure continued 

strengthening with gross motor skills.  Upon initial evaluation, patient was 

dependent assist x 2 with all mobility and extremely lethargic.  Patient was 

unable to assist with any gross motor mobility.  Upon dismissal, patient is 

modified independent with all gross motor skills and has returned to OF 

safely and will return to home on this date.  Patient ambulates with ' 

with very slow, methodical gait sequence.  She continues to require time to 

complete all functional tasks.  Patient is highly motivated  to return to home 

with her pets.  Goals attained.





PT Long Term Goals


Long Term Goals


PT Long Term Goals Time Frame:  Jun 6, 2017


Transfers (B,C,W/C) (FIM):  5 (met 5/22/17)


Roll Left to Right (QC):  5 (met 5/22/17)


Sit to Lying (QC):  5 (met 5/22/17)


Lying-Sitting on Side/Bed(QC):  5 (met 5/22/17)


Sit to Stand (QC):  5 (met 5/22/17)


Chair/Bed-to-Chair Xfer(QC):  5 (met 5/22/17)


Car Transfer (QC):  5 (met 5/22/17)


Does the Patient Walk:  No and Walking Goal IS indicated


Gait (FIM):  2 (met 5/22/17)


Gait distance (FIM):  8=396-51 ft


Distance:  75'


Walk 10 feet (QC):  5 (met 5/22/17)


Walk 10ft-Uneven Surface(QC):  5 (met 5/22/17)


Walk 50ft with 2 Turns (QC):  5 (met)


Walk 150 ft (QC):  88 (met 5/22/17)


Gait Level of Assist:  5 (met 5/22/17)


Gait Assistive Device:  FWW


# of Steps:  4 (met 5/2217)


1 Step (curb) (QC):  4 (met 5/22/17)


4 Steps (QC):  4 (met 5/2/17)


12 Steps (QC):  88 (met 5/22/17)


Stairs Level Of Assist:  5 (met 5/22/17)


Picking up an Object (QC):  4 (met 5/22/17)





OT Long Term Goals


Long Term Goals


Time Frame:  May 30, 2017


Eating (FIM):  6


Eating (QC):  6


Grooming(FIM):  6


Bathing(FIM):  5


Shower/Bathe Self (QC):  4


Upper Body Dressing(FIM):  5


Upper Body Dressing (QC):  4


Lower Body Dressing(FIM):  5


Lower Body Dressing (QC):  4


On/Off Footwear (QC):  5


Toileting(FIM):  5


Toileting Hygiene (QC):  4


Toilet/Commode Transfer(FIM):  5


Toilet/Commode Transfer (QC):  4


Shower Transfer(FIM):  5


Additional Goals:  1-Demonstrate ADL Tasks, 2-Verbalize Understanding, 3-

ImproveStrength/Cha


1=Demonstrate adherence to instructed precautions during ADL tasks.


2=Patient will verbalize/demonstrate understanding of assistive devices/

modifications for ADL.


3=Patient will improve strength/tolerance for activity to enable patient to 

perform ADL's.











DAR SCHMIDT PT May 23, 2017 08:04

## 2017-06-08 ENCOUNTER — HOSPITAL ENCOUNTER (INPATIENT)
Dept: HOSPITAL 75 - ER | Age: 67
LOS: 4 days | Discharge: NURSING, REHAB OR LONG-TERM CARE | DRG: 603 | End: 2017-06-12
Attending: FAMILY MEDICINE | Admitting: FAMILY MEDICINE
Payer: MEDICARE

## 2017-06-08 VITALS — DIASTOLIC BLOOD PRESSURE: 73 MMHG | SYSTOLIC BLOOD PRESSURE: 148 MMHG

## 2017-06-08 VITALS — HEIGHT: 66 IN | BODY MASS INDEX: 35.58 KG/M2 | WEIGHT: 221.37 LBS

## 2017-06-08 DIAGNOSIS — R53.1: ICD-10-CM

## 2017-06-08 DIAGNOSIS — G25.81: ICD-10-CM

## 2017-06-08 DIAGNOSIS — Z96.642: ICD-10-CM

## 2017-06-08 DIAGNOSIS — Z87.891: ICD-10-CM

## 2017-06-08 DIAGNOSIS — R26.81: ICD-10-CM

## 2017-06-08 DIAGNOSIS — F41.9: ICD-10-CM

## 2017-06-08 DIAGNOSIS — E11.40: ICD-10-CM

## 2017-06-08 DIAGNOSIS — M54.9: ICD-10-CM

## 2017-06-08 DIAGNOSIS — Z79.4: ICD-10-CM

## 2017-06-08 DIAGNOSIS — L03.115: Primary | ICD-10-CM

## 2017-06-08 DIAGNOSIS — I87.2: ICD-10-CM

## 2017-06-08 DIAGNOSIS — I50.9: ICD-10-CM

## 2017-06-08 DIAGNOSIS — F32.9: ICD-10-CM

## 2017-06-08 DIAGNOSIS — I11.0: ICD-10-CM

## 2017-06-08 DIAGNOSIS — E11.65: ICD-10-CM

## 2017-06-08 DIAGNOSIS — L30.4: ICD-10-CM

## 2017-06-08 DIAGNOSIS — R29.6: ICD-10-CM

## 2017-06-08 DIAGNOSIS — M19.91: ICD-10-CM

## 2017-06-08 DIAGNOSIS — L03.116: ICD-10-CM

## 2017-06-08 LAB
ALBUMIN SERPL-MCNC: 4 G/DL (ref 3.2–4.5)
ALT SERPL-CCNC: 19 U/L (ref 0–55)
ANION GAP SERPL CALC-SCNC: 12 MMOL/L (ref 5–14)
AST SERPL-CCNC: 19 U/L (ref 5–34)
BASOPHILS # BLD AUTO: 0.1 10^3/UL (ref 0–0.1)
BASOPHILS NFR BLD AUTO: 1 % (ref 0–10)
BILIRUB SERPL-MCNC: 0.2 MG/DL (ref 0.1–1)
BILIRUB UR QL STRIP: (no result)
BUN SERPL-MCNC: 24 MG/DL (ref 7–18)
BUN/CREAT SERPL: 13
CALCIUM SERPL-MCNC: 9.7 MG/DL (ref 8.5–10.1)
CHLORIDE SERPL-SCNC: 104 MMOL/L (ref 98–107)
CO2 SERPL-SCNC: 26 MMOL/L (ref 21–32)
CREAT SERPL-MCNC: 1.79 MG/DL (ref 0.6–1.3)
EOSINOPHIL # BLD AUTO: 0.5 10^3/UL (ref 0–0.3)
EOSINOPHIL NFR BLD AUTO: 7 % (ref 0–10)
ERYTHROCYTE [DISTWIDTH] IN BLOOD BY AUTOMATED COUNT: 15.4 % (ref 10–14.5)
GFR SERPLBLD BASED ON 1.73 SQ M-ARVRAT: 28 ML/MIN
GLUCOSE SERPL-MCNC: 101 MG/DL (ref 70–105)
KETONES UR QL STRIP: NEGATIVE
LEUKOCYTE ESTERASE UR QL STRIP: (no result)
LYMPHOCYTES # BLD AUTO: 1 X 10^3 (ref 1–4)
LYMPHOCYTES NFR BLD AUTO: 13 % (ref 12–44)
MCH RBC QN AUTO: 26 PG (ref 25–34)
MCHC RBC AUTO-ENTMCNC: 31 G/DL (ref 32–36)
MCV RBC AUTO: 85 FL (ref 80–99)
MONOCYTES # BLD AUTO: 0.9 X 10^3 (ref 0–1)
MONOCYTES NFR BLD AUTO: 12 % (ref 0–12)
NEUTROPHILS # BLD AUTO: 5.3 X 10^3 (ref 1.8–7.8)
NEUTROPHILS NFR BLD AUTO: 68 % (ref 42–75)
NITRITE UR QL STRIP: NEGATIVE
PH UR STRIP: 5 [PH] (ref 5–9)
PLATELET # BLD: 244 10^3/UL (ref 130–400)
PMV BLD AUTO: 11.3 FL (ref 7.4–10.4)
POTASSIUM SERPL-SCNC: 5.1 MMOL/L (ref 3.6–5)
PROT SERPL-MCNC: 7.4 G/DL (ref 6.4–8.2)
PROT UR QL STRIP: (no result)
RBC # BLD AUTO: 4.16 10^6/UL (ref 4.35–5.85)
SODIUM SERPL-SCNC: 142 MMOL/L (ref 135–145)
SP GR UR STRIP: 1.01 (ref 1.02–1.02)
UROBILINOGEN UR-MCNC: NORMAL MG/DL
WBC # BLD AUTO: 7.8 10^3/UL (ref 4.3–11)
WBC #/AREA URNS HPF: (no result) /HPF

## 2017-06-08 PROCEDURE — 85025 COMPLETE CBC W/AUTO DIFF WBC: CPT

## 2017-06-08 PROCEDURE — 94760 N-INVAS EAR/PLS OXIMETRY 1: CPT

## 2017-06-08 PROCEDURE — 36415 COLL VENOUS BLD VENIPUNCTURE: CPT

## 2017-06-08 PROCEDURE — 80053 COMPREHEN METABOLIC PANEL: CPT

## 2017-06-08 PROCEDURE — 80048 BASIC METABOLIC PNL TOTAL CA: CPT

## 2017-06-08 PROCEDURE — 82962 GLUCOSE BLOOD TEST: CPT

## 2017-06-08 PROCEDURE — 85027 COMPLETE CBC AUTOMATED: CPT

## 2017-06-08 PROCEDURE — 81000 URINALYSIS NONAUTO W/SCOPE: CPT

## 2017-06-08 RX ADMIN — SODIUM CHLORIDE SCH MLS/HR: 900 INJECTION, SOLUTION INTRAVENOUS at 23:51

## 2017-06-08 NOTE — ED GENERAL
General


Chief Complaint:  General Problems/Pain


Stated Complaint:  HYPOTENSION


Nursing Triage Note:  


home health activated EMS because patient was found to be talking abnormally, 


per home health patient gets this way when she has a UTI. unknown last known 


well time. patient is currently being treated for cellulitis to [facility]


Nursing Sepsis Screen:  No Definite Risk





History of Present Illness


Time Seen by Provider:  15:50


Initial Comments


Evaluation for confusion, fatigue, and difficulty in self-care at home. The 

patient has recently been discharged on 5/23/17 from Rehab at [facility]. She 

has home health services but is needing additional assistance.


Timing/Duration:  1-2 Days


Modifying Factors:  improves with Rest


Associated Systoms:  Malaise, Weakness





Allergies and Home Medications


Allergies


Coded Allergies:  


     No Known Drug Allergies (Unverified , 3/11/14)





Home Medications


Acetaminophen 500 Mg Tablet, 500 MG PO QID PRN for PAIN-MILD for 30 Days, #100


   Prescribed by: ZHANG RODAS on 5/22/17 0935


Alprazolam 0.25 Mg Tablet, 0.25 MG PO BID PRN for ANXIETY, (Reported)


Cefdinir 300 Mg Capsule, #14 (Reported)


Docusate Sodium 100 Mg Capsule, 100 MG PO BID for 30 Days, #60


   Prescribed by: ZHANG RODAS on 5/22/17 0935


Duloxetine HCl 30 Mg Capsule.dr, 30 MG PO DAILY, (Reported)


Esomeprazole Magnesium 40 Mg Cap, 40 MG PO DAILY, (Reported)


   TAKES BEFORE BREAKFAST 


Furosemide 40 Mg Tablet, 40 MG PO DAILY, (Reported)


Insulin Aspart 300 Units/3 Ml Solution, SQ QID, (Reported)


    = 0 UNITS 201-250 = 3 UNITS 251-300 = 5 UNITS 301-350 = 7 UNITS 351-

400 = 9 UNITS 


Iron Polysaccharide Complex 150 Mg Capsule, 150 MG PO BID, (Reported)


Loratadine 10 Mg Tablet, 10 MG PO DAILY, (Reported)


Metformin HCl 1,000 Mg Tablet, 1,000 MG PO BID, (Reported)


Metoprolol Succinate 50 Mg Tab.er.24h, 50 MG PO DAILY, (Reported)


Olanzapine 2.5 Mg Tablet, 2.5 MG PO DAILY for 30 Days, #30


   Prescribed by: ZHANG RODAS on 5/22/17 0935


Polyethylene Glycol 3350 17 Gm Powd.pack, 17 GM PO DAILY PRN for CONSTIPATION, (

Reported)


Pregabalin 150 Mg Capsule, 150 MG PO TID, (Reported)


Ropinirole HCl 2 Mg Tablet, 2 MG PO BID, (Reported)


Rosuvastatin Calcium 10 Mg Tablet, 10 MG PO DAILY, (Reported)


   LAST FILLED 03/30/17 #30  


Solifenacin Succinate 10 Mg Tablet, 10 MG PO DAILY, (Reported)


Tramadol HCl 50 Mg Tablet, 50 MG PO Q8H, (Reported)





Constitutional:  no symptoms reported, see HPI


EENTM:  no symptoms reported, see HPI


Respiratory:  no symptoms reported, see HPI


Cardiovascular:  no symptoms reported, see HPI


Gastrointestinal:  no symptoms reported, see HPI


Genitourinary:  no symptoms reported, see HPI


Musculoskeletal:  see HPI, muscle weakness


Skin:  see HPI, change in color (bilateral lower extremities ongoing with wound 

care per Dr. Wise.)


Psychiatric/Neurological:  No Symptoms Reported, See HPI


Hematologic/Lymphatic:  No Symptoms Reported, See HPI


Immunological/Allergic:  no symptoms reported, see HPI


All Other Systems Reviewed


Negative Unless Noted:  Yes





Past Medical-Social-Family Hx


Patient Social History


Alcohol Use:  Denies Use


Recreational Drug Use:  No


Type Used:  Cigarettes


Former Smoker/When Quit:  Feb 5, 1992


Recent Foreign Travel:  No


Contact w/Someone Who Travel:  No


Recent Infectious Disease Expo:  No


Recent Hopitalizations:  Yes (This week... DC 5/6/17 from fourth)





Immunizations Up To Date


Tetanus Booster (TDap):  Less than 5yrs


PED Vaccines UTD:  Yes


Date of Pneumonia Vaccine:  Sep 12, 2012


Date of Influenza Vaccine:  Sep 18, 2016





Seasonal Allergies


Seasonal Allergies:  No





Surgeries


HX Surgeries:  Yes (HEMORRHOIDECTOMY, L HIP replacement, reduction left hip 

dislocation x2)


Surgeries:  Gallbladder, Joint Replacement, Orthopedic





Respiratory


Hx Respiratory Disorders:  No





Cardiovascular


Hx Cardiac Disorders:  Yes (CHF)


Cardiac Disorders:  Hypertension





Neurological


Hx Neurological Disorders:  Yes (RLS)


Neurological Disorders:  Neuropathy





Reproductive System


Hx Reproductive Disorders:  No


Sexually Transmitted Disease:  No


HIV/AIDS:  No





Genitourinary


Hx Genitourinary Disorders:  No





Gastrointestinal


Hx Gastrointestinal Disorders:  Yes


Gastrointestinal Disorders:  Hemorrhoids





Musculoskeletal


Hx Musculoskeletal Disorders:  Yes (school age /  accident)


Musculoskeletal Disorders:  Arthritis, Chronic Back Pain





Endocrine


Hx Endocrine Disorders:  Yes (Lantus/ Novolog)


Endocrine Disorders:  Diabetes, Insulin dep





HEENT


HX ENT Disorders:  Yes (report the start of cataracts, migrains)


HEENT Disorders:  Cataract


Loss of Vision:  Bilateral


Hearing Impairment:  Denies





Cancer


Hx Cancer:  No





Psychosocial


Hx Psychiatric Problems:  Yes


Behavioral Health Disorders:  Anxiety, Depression





Integumentary


HX Skin/Integumentary Disorder:  Yes (SEEING WOUND CARE FOR WOUND ON FEET, 

history of cellulitis)





Blood Transfusions


Hx Blood Disorders:  No


Adverse Reaction to a Blood Tr:  No





Reviewed Nursing Assessment


Reviewed/Agree w Nursing PMH:  Yes





Family Medical History


Significant Family History:  No Pertinent Family Hx


Family Medial History:  


Congestive heart failure


  03 FATHER


Prostate cancer


  03 FATHER





No Family History of:


  Abdominal aortic aneurysm


  Alexander's disease


  Alcoholism


  Aphasia


  Cancer


  Cancer of colon


  Cataract


  Congenital heart disease


  Cystic fibrosis


  Dementia


  Dysphagia


  Family history: Allergy


  Family history: Alzheimer's disease


  Family history: Arthritis


  Family history: Asthma


  Family history: Breast disease


  Family history: Cardiovascular disease


  Family history: Coronary thrombosis


  Family history: Diabetes mellitus


  Family history: Gastrointestinal disease


  Family history: Glaucoma


  Family history: Hypertension


  Family history: Osteoporosis


  Family history: Thyroid disorder


  Headache


  Hearing loss


  Heart disease


  Hereditary disease


  History of - anemia


  History of - disorder


  History of - respiratory disease


  History of drug abuse


  Human immunodeficiency virus (HIV) seropositivity


  Hypercholesterolemia


  Infertile


  Kidney disease


  Malignant neoplasm of lung


  Myocardial infarction


  Parkinson's disease


  Psychotic disorder


  Seizure disorder


  Stroke


  Tuberculosis


  Visual impairment





Physical Exam


Vital Signs





Vital Sign - Last 12Hours








 6/8/17





 15:41


 


Temp 98.3


 


Pulse 64


 


Resp 18


 


B/P (MAP) 95/52


 


Pulse Ox 100


 


O2 Delivery Room Air





Capillary Refill : Less Than 3 Seconds


General Appearance:  No Apparent Distress, WD/WN


Eyes:  Bilateral Eye EOMI, Bilateral Eye Normal Inspection, Bilateral Eye PERRL


HEENT:  PERRL/EOMI, TMs Normal, Normal ENT Inspection, Pharynx Normal


Neck:  Full Range of Motion, Normal Inspection, Non Tender, Supple


Respiratory:  Chest Non Tender, Lungs Clear, Normal Breath Sounds


Cardiovascular:  Regular Rate, Rhythm, No Murmur


Gastrointestinal:  Normal Bowel Sounds, No Organomegaly, No Pulsatile Mass, Non 

Tender, Soft


Back:  Normal Inspection, No CVA Tenderness, No Vertebral Tenderness


Extremity:  Slow Capillary Refill, Other (limitation of motion bilateral lower 

extremities)


Neurologic/Psychiatric:  Alert, Oriented x3, No Motor/Sensory Deficits, Normal 

Mood/Affect


Skin:  Normal Color, Warm/Dry


Lymphatic:  No Adenopathy





Progress/Results/Core Measures


Results/Orders


Lab Results





Laboratory Tests








Test


  6/8/17


15:40 Range/Units


 


 


White Blood Count


  7.8 


  4.3-11.0


10^3/uL


 


Red Blood Count


  4.16 L


  4.35-5.85


10^6/uL


 


Hemoglobin 11.0 L 11.5-16.0  G/DL


 


Hematocrit 36  35-52  %


 


Mean Corpuscular Volume 85  80-99  FL


 


Mean Corpuscular Hemoglobin 26  25-34  PG


 


Mean Corpuscular Hemoglobin


Concent 31 L


  32-36  G/DL


 


 


Red Cell Distribution Width 15.4 H 10.0-14.5  %


 


Platelet Count


  244 


  130-400


10^3/uL


 


Mean Platelet Volume 11.3 H 7.4-10.4  FL


 


Neutrophils (%) (Auto) 68  42-75  %


 


Lymphocytes (%) (Auto) 13  12-44  %


 


Monocytes (%) (Auto) 12  0-12  %


 


Eosinophils (%) (Auto) 7  0-10  %


 


Basophils (%) (Auto) 1  0-10  %


 


Neutrophils # (Auto) 5.3  1.8-7.8  X 10^3


 


Lymphocytes # (Auto) 1.0  1.0-4.0  X 10^3


 


Monocytes # (Auto) 0.9  0.0-1.0  X 10^3


 


Eosinophils # (Auto)


  0.5 H


  0.0-0.3


10^3/uL


 


Basophils # (Auto)


  0.1 


  0.0-0.1


10^3/uL


 


Urine Color YELLOW   


 


Urine Clarity


  SLIGHTLY


CLOUDY  


 


 


Urine pH 5  5-9  


 


Urine Specific Gravity 1.015 L 1.016-1.022  


 


Urine Protein 1+ H NEGATIVE  


 


Urine Glucose (UA) NEGATIVE  NEGATIVE  


 


Urine Ketones NEGATIVE  NEGATIVE  


 


Urine Nitrite NEGATIVE  NEGATIVE  


 


Urine Bilirubin 3+ H NEGATIVE  


 


Urine Urobilinogen NORMAL  NORMAL  MG/DL


 


Urine Leukocyte Esterase 1+ H NEGATIVE  


 


Urine RBC (Auto) NEGATIVE  NEGATIVE  


 


Urine RBC NONE   /HPF


 


Urine WBC 0-2   /HPF


 


Urine Crystals NONE   /LPF


 


Urine Bacteria NONE   /HPF


 


Urine Casts NONE   /LPF


 


Urine Mucus NEGATIVE   /LPF


 


Urine Culture Indicated NO   


 


Sodium Level 142  135-145  MMOL/L


 


Potassium Level 5.1 H 3.6-5.0  MMOL/L


 


Chloride Level 104    MMOL/L


 


Carbon Dioxide Level 26  21-32  MMOL/L


 


Anion Gap 12  5-14  MMOL/L


 


Blood Urea Nitrogen 24 H 7-18  MG/DL


 


Creatinine


  1.79 H


  0.60-1.30


MG/DL


 


Estimat Glomerular Filtration


Rate 28 


   


 


 


BUN/Creatinine Ratio 13   


 


Glucose Level 101    MG/DL


 


Calcium Level 9.7  8.5-10.1  MG/DL


 


Total Bilirubin 0.2  0.1-1.0  MG/DL


 


Aspartate Amino Transf


(AST/SGOT) 19 


  5-34  U/L


 


 


Alanine Aminotransferase


(ALT/SGPT) 19 


  0-55  U/L


 


 


Alkaline Phosphatase 76    U/L


 


Total Protein 7.4  6.4-8.2  G/DL


 


Albumin 4.0  3.2-4.5  G/DL








My Orders





Orders - SHANSEDRICK


Cbc With Automated Diff (6/8/17 15:59)


Comprehensive Metabolic Panel (6/8/17 15:59)


Ua Culture If Indicated (6/8/17 15:59)


Saline Lock/Iv-Start (6/8/17 16:05)


Ns Iv 1000 Ml (Sodium Chloride 0.9%) (6/8/17 16:05)





Medications Given in ED





Current Medications








 Medications  Dose


 Ordered  Sig/Riaz


 Route  Start Time


 Stop Time Status Last Admin


Dose Admin


 


 Sodium Chloride  1,000 ml @ 


 0 mls/hr  Q0M ONCE


 IV  6/8/17 16:05


 6/8/17 16:06 DC 6/8/17 16:25


999 MLS/HR








Vital Signs/I&O





Vital Sign - Last 12Hours








 6/8/17 6/8/17 6/8/17 6/8/17





 15:41 19:04 19:30 23:18


 


Temp 98.3 98.3 96.8 


 


Pulse 64 72 75 


 


Resp 18 20 16 


 


B/P (MAP) 95/52  148/73 


 


Pulse Ox 100 100 96 96


 


O2 Delivery Room Air   


 


    





 6/8/17 6/9/17  





 23:25 00:00  


 


Temp  96.8  


 


Pulse  64  


 


Resp  20  


 


B/P (MAP)  103/51  


 


Pulse Ox 96 98  














Blood Pressure Mean:  66








Progress Note :  


   Time:  15:50


Progress Note


Initial evaluation completed, will check labs and reevaluate.


1640 all labs essentially normal with no signs of UTI. Discussed these results 

with the patient with plans for discharge to home. She agreed with this 

treatment plan.


1700 2 staff attempted to stand the patient from bed to wheelchair and she was 

unable to transfer. Discussed this with the patient in detail that I have 

concerns about safety with her returning home. She has home health care that no 

family members are available to stay with her. 


1745 discussed patient by phone with Dr. Dacosta agreed with admission for 

observation OT and PT consults and  to make plans for additional 

services at home or consider long-term care evaluation for temporary placement. 

Discussed this plan with the patient she agrees with that at this time. She 

does have concerns with consideration of long-term care placement, even if 

temporary. Explained my concerns about safety risks with her at home alone.





Departure


Impression


Impression:  


 Primary Impression:  


 Fatigue


 Qualified Codes:  R53.83 - Other fatigue


 Additional Impression:  


 INABILITY TO CARE FOR SELF


Disposition:  09 ADMITTED AS INPATIENT


Condition:  Stable


Decision to Admit Reason:  Admit from ER (General)


Time/Decision to Admit Time:  18:00





Departure-Patient Inst.


Referrals:  


LELAND DACOSTA DO (PCP/Family)


Primary Care Physician





Add. Discharge Instructions:  


All discharge instructions reviewed with patient and/or family. Voiced 

understanding.





Copy


Copies To 1:   LELAND DACOSTA DO


Copies To 2:   ROSHNI WISE MD, AMY ARNP Jun 8, 2017 17:34

## 2017-06-09 VITALS — DIASTOLIC BLOOD PRESSURE: 60 MMHG | SYSTOLIC BLOOD PRESSURE: 135 MMHG

## 2017-06-09 VITALS — SYSTOLIC BLOOD PRESSURE: 139 MMHG | DIASTOLIC BLOOD PRESSURE: 62 MMHG

## 2017-06-09 VITALS — SYSTOLIC BLOOD PRESSURE: 162 MMHG | DIASTOLIC BLOOD PRESSURE: 68 MMHG

## 2017-06-09 VITALS — DIASTOLIC BLOOD PRESSURE: 51 MMHG | SYSTOLIC BLOOD PRESSURE: 103 MMHG

## 2017-06-09 VITALS — DIASTOLIC BLOOD PRESSURE: 67 MMHG | SYSTOLIC BLOOD PRESSURE: 143 MMHG

## 2017-06-09 VITALS — SYSTOLIC BLOOD PRESSURE: 129 MMHG | DIASTOLIC BLOOD PRESSURE: 71 MMHG

## 2017-06-09 VITALS — DIASTOLIC BLOOD PRESSURE: 65 MMHG | SYSTOLIC BLOOD PRESSURE: 128 MMHG

## 2017-06-09 LAB
ALBUMIN SERPL-MCNC: 3.2 G/DL (ref 3.2–4.5)
ALT SERPL-CCNC: 16 U/L (ref 0–55)
ANION GAP SERPL CALC-SCNC: 12 MMOL/L (ref 5–14)
AST SERPL-CCNC: 22 U/L (ref 5–34)
BASOPHILS # BLD AUTO: 0.1 10^3/UL (ref 0–0.1)
BASOPHILS NFR BLD AUTO: 1 % (ref 0–10)
BILIRUB SERPL-MCNC: 0.3 MG/DL (ref 0.1–1)
BUN SERPL-MCNC: 19 MG/DL (ref 7–18)
BUN/CREAT SERPL: 17
CALCIUM SERPL-MCNC: 9.2 MG/DL (ref 8.5–10.1)
CHLORIDE SERPL-SCNC: 106 MMOL/L (ref 98–107)
CO2 SERPL-SCNC: 24 MMOL/L (ref 21–32)
CREAT SERPL-MCNC: 1.14 MG/DL (ref 0.6–1.3)
EOSINOPHIL # BLD AUTO: 0.7 10^3/UL (ref 0–0.3)
EOSINOPHIL NFR BLD AUTO: 9 % (ref 0–10)
ERYTHROCYTE [DISTWIDTH] IN BLOOD BY AUTOMATED COUNT: 15.4 % (ref 10–14.5)
GFR SERPLBLD BASED ON 1.73 SQ M-ARVRAT: 48 ML/MIN
GLUCOSE SERPL-MCNC: 160 MG/DL (ref 70–105)
LYMPHOCYTES # BLD AUTO: 1.1 X 10^3 (ref 1–4)
LYMPHOCYTES NFR BLD AUTO: 14 % (ref 12–44)
MCH RBC QN AUTO: 27 PG (ref 25–34)
MCHC RBC AUTO-ENTMCNC: 32 G/DL (ref 32–36)
MCV RBC AUTO: 86 FL (ref 80–99)
MONOCYTES # BLD AUTO: 0.9 X 10^3 (ref 0–1)
MONOCYTES NFR BLD AUTO: 12 % (ref 0–12)
NEUTROPHILS # BLD AUTO: 4.9 X 10^3 (ref 1.8–7.8)
NEUTROPHILS NFR BLD AUTO: 64 % (ref 42–75)
PLATELET # BLD: 200 10^3/UL (ref 130–400)
PMV BLD AUTO: 11.5 FL (ref 7.4–10.4)
POTASSIUM SERPL-SCNC: 4.5 MMOL/L (ref 3.6–5)
PROT SERPL-MCNC: 6.4 G/DL (ref 6.4–8.2)
RBC # BLD AUTO: 3.9 10^6/UL (ref 4.35–5.85)
SODIUM SERPL-SCNC: 142 MMOL/L (ref 135–145)
WBC # BLD AUTO: 7.6 10^3/UL (ref 4.3–11)

## 2017-06-09 RX ADMIN — METFORMIN HYDROCHLORIDE SCH MG: 500 TABLET, FILM COATED ORAL at 17:07

## 2017-06-09 RX ADMIN — DOCUSATE SODIUM SCH MG: 100 CAPSULE ORAL at 20:44

## 2017-06-09 RX ADMIN — CLINDAMYCIN PHOSPHATE SCH MLS/HR: 150 INJECTION, SOLUTION INTRAVENOUS at 21:47

## 2017-06-09 RX ADMIN — Medication SCH ML: at 03:19

## 2017-06-09 RX ADMIN — Medication SCH ML: at 20:44

## 2017-06-09 RX ADMIN — Medication SCH ML: at 14:20

## 2017-06-09 RX ADMIN — ROPINIROLE SCH MG: 1 TABLET ORAL at 20:43

## 2017-06-09 RX ADMIN — SODIUM CHLORIDE SCH MLS/HR: 900 INJECTION, SOLUTION INTRAVENOUS at 17:07

## 2017-06-09 RX ADMIN — PREGABALIN SCH MG: 75 CAPSULE ORAL at 20:44

## 2017-06-09 RX ADMIN — Medication SCH MG: at 20:44

## 2017-06-09 RX ADMIN — ENOXAPARIN SODIUM SCH MG: 100 INJECTION SUBCUTANEOUS at 08:17

## 2017-06-09 RX ADMIN — ATORVASTATIN CALCIUM SCH MG: 20 TABLET, FILM COATED ORAL at 20:44

## 2017-06-09 RX ADMIN — SODIUM CHLORIDE SCH MLS/HR: 900 INJECTION INTRAVENOUS at 08:17

## 2017-06-09 RX ADMIN — TROSPIUM CHLORIDE SCH MG: 20 TABLET, FILM COATED ORAL at 17:07

## 2017-06-09 RX ADMIN — CLINDAMYCIN PHOSPHATE SCH MLS/HR: 150 INJECTION, SOLUTION INTRAVENOUS at 15:13

## 2017-06-09 RX ADMIN — MICONAZOLE NITRATE SCH APPLIC: 20 POWDER TOPICAL at 15:14

## 2017-06-09 RX ADMIN — ACETAMINOPHEN PRN MG: 500 TABLET ORAL at 17:53

## 2017-06-09 RX ADMIN — PREGABALIN SCH MG: 75 CAPSULE ORAL at 15:13

## 2017-06-09 NOTE — PHYSICAL THERAPY EVALUATION
PT Evaluation-General


Medical Diagnosis


Admission Date


2017 at 18:30


Medical Diagnosis:  hypotension


Onset Date:  2017





Therapy Diagnosis


Therapy Diagnosis:  general debility





Height/Weight


Height (Feet):  5


Height (Inches):  6.00


Weight (Pounds):  220


Weight (Ounces):  4.0





Precautions


Precautions/Isolations:  Fall Prevention, Standard Precautions





Referral


Physician:  Laurie


Reason for Referral:  Evaluation/Treatment





Medical History


Pertinent Medical History:  Arthritis, DM, Heart Failure, HTN, Neuropathy, OA


Additional Medical History


recent ARU stay 17


Current History


home health called EMS due to increased confusion, unsteady gait, cellulitis 

bilateral LE and inability to care for self at home.


Reviewed History:  Yes





Social History


Home:  Single Level


Current Living Status:  Alone





Prior/Core FIM


Prior Level of Function


              Functional [place] Measure


0=Not Assessed/NA   4=Minimal Assistance


1=Total Assistance   5=Supervision or Setup


2=Maximal Assistance   6=Modified [place]


3=Moderate Assistance   7=Complete [place]


Bed Mobility:  6


Transfers (B,C,W/C) (FIM):  6


Gait:  6





PT Evaluation-Current


Subjective


Patient agrees to PT.  No c/o at this time.





Pain





   Numeric Pain Scale:  0-No Pain


   Location:  No Pain Reported





Objective


Patient Orientation:  Normal For Age


Problem Solving:  Good


Attachments:  IV





ROM/Strength


ROM Lower Extremities


bilateral LE WFL


Strenght Lower Extremities


bilateral LE WFL





Integumentary/Posture


Integumentary


refer to nursing notes; noted redness bilateral LE's distally


Bowel Incontinence:  No


Bladder Incontinence:  No


Posture


kyphotic





Neuromuscular


(Tone, Coordination, Reflexes)


diminished coordination and tone (PLOF)





Sensory


Vision:  Functional


Hearing:  Functional


Sensation Right Lower Extremit:  Impaired


Sensation Left Lower Extremity:  Impaired





Transfers


              Functional [place] Measure


0=Not Assessed/NA   4=Minimal Assistance


1=Total Assistance   5=Supervision or Setup


2=Maximal Assistance   6=Modified [place]


3=Moderate Assistance   7=Complete [place]


Transfers (B, C, W/C) (FIM):  5


Scootin


Rollin


Supine to/from Sit:  5


Sit to/from Stand:  5





Gait


Mode of Locomotion:  Walk


Anticipated Mode of Locomotion:  Walk


Gait (FIM):  5


Distance (FIM):  3=150 ft


Distance:  200'


Gait Level of Assist:  5


Gait Persons Needed:  1


Gait Assistive Device:  FWW


Comments/Gait Description


slow, steady gait sequence





Balance


Sitting Static:  Normal


Sitting Dynamic:  Normal


Standing Static:  Normal


 Standing Dynamic:  Normal





Assessment/Needs


67 y.o. female, will benefit from short term skilled PT to address functional 

strength and mobility to improve current LOF and to safely return to home or 

care facility at maximum LOF.


Rehab Potential:  Fair


Post Rehab Potential-Barriers:  compliance





PT Long Term Goals


Long Term Goals


PT Long Term Goals Time Frame:  2017


Transfers (B,C,W/C) (FIM):  6


Gait (FIM):  6


Gait distance (FIM):  3=150 ft


Gait Level of Assist:  6


Gait Assistive Device:  FWW





PT Plan


Problem List


Problem List:  Functional Strength





Treatment/Plan


Treatment Plan:  Continue Plan of Care


Treatment Plan:  Bed Mobility, Education, Functional Activity Cha, Functional 

Strength, Gait, Safety, Therapeutic Exercise, Transfers


Treatment Duration:  2017


# of days/week


6


Visits Per Week:  6


Pt/Family Agrees w/Plan:  Yes





Safety Risks/Education


Patient Education:  Safety Issues


Teaching Recipient:  Patient


Teaching Methods:  Discussion


Response to Teaching:  Verbalize Understanding





Discharge Recommendations


Therapy D/C Recommendations:  Home Independently, Physical Therapy Home Care, 

Skilled Nursing (TCU/NH)





Time/GCodes


Time In:  1035


Time Out:  1050


Total Billed Treatment Time:  15


Total Billed Treatment


1 visit


EVLowC 15 min


G Codes Necessary:  Yes





PT/OT Therapy GCodes


Therapy


Functional Limitation:  Physical Therapy


Test(s)/Tool used to determine:  Level of Assistance Scale





Functional Limitation-Current


Charge Code:  MOBCUR


Modifier:  CJ





Functional Limitation-Goal


Charge Code:  MOBGOAL


Modifier:  DAR CORONADO PT 2017 11:26

## 2017-06-09 NOTE — OCCUPATIONAL THERAPY EVAL
OT Evaluation-General/PLF


Medical Diagnosis


Admission Date


Jun 8, 2017 at 18:30


Medical Diagnosis:  hypotension


Onset Date:  Jun 8, 2017





Therapy Diagnosis


Therapy Diagnosis:  decreased self care skills





Height/Weight


Height (Feet):  5


Height (Inches):  6.00


Weight (Pounds):  220


Weight (Ounces):  4.0





Precautions


Precautions/Isolations:  Fall Prevention, Standard Precautions





Referral


Physician:  Laurie





Medical History


Pertinent Medical History:  Arthritis, DM, Heart Failure, HTN, Neuropathy, OA


Additional Medical History


left hip replacement, reduction of left hip dislocation x2, CHF, RLS, chronic 

back pain, anxiety, depression


Reviewed History:  Yes





Social History


Home:  Single Level


Current Living Status:  Alone


Entry Into Home:  Ramp





ADL-Prior Level of Function


ADL PLOF Comments


Pt states she has been mostly independent. Sister checks on her.


DME/Equipment:  Bath Bench, Tall Toilet, Tub/Shower





OT Current Status


Subjective


Pt sitting in chair, agrees to treatment. Pt has no c/o pain during session.





Mental Status/Objective


Patient Orientation:  Person, Place


Attachments:  IV





Current


Glasses/Contacts:  Yes (reading glasses)


Dentures/Partials:  Yes


Hand Dominance:  Right


Upper Extremity ROM


Grossly WFL


Upper Extremity Strength


Right grossly 4/5


Left grossly 4-/5





ADL-Treatment


ADL-Current


Pt participated in UE assessment while seated. Pt able to doff socks, but 

requires minimal assistance to don. Pt completed grooming tasks while seated. 

Pt combed hair and completed denture care with set up. Pt performed sit to 

stand with CGA. Uses FWW for balance. Pt requires increased time for ADL tasks 

and cues to attend to task. Pt sitting in chair with needs met after session.


              Functional [place] Measure


0=Not Assessed/NA   4=Minimal Assistance


1=Total Assistance   5=Supervision or Setup


2=Maximal Assistance   6=Modified [place]


3=Moderate Assistance   7=Complete IndependenceIRFPAI Quality Coding Scale











6 Independent with activity with or without an assistive device


 


5  Patient requires set up or clean up by helper.  Patient completes activity  

by  themselves


 


4 Supervision or touching assist (CGA). [place] provide cues , steadying assist


 


3 The helper provides less than half the effort to complete the activity


 


2 The helper provides more than half the effort to complete the activity


 


1 Dependent.  The helper does all the effort to complete an activity 


 


7 Patient refused to complete or attempt activity


 


9 The patient did not perform the activity before the current illness or injury


 


88 Not attempted due to Medical conditions or safety concerns








Grooming (FIM):  5


Lower Body Dressing (FIM):  4 (socks only)





Education


OT Patient Education:  Rehab process


Teaching Recipient:  Patient


Teaching Methods:  Discussion





OT Short Term Goals


Short Term Goals


1=Demonstrate adherence to instructed precautions during ADL tasks.


2=Patient will verbalize/demonstrate understanding of assistive devices/

modifications for ADL.


3=Patient will improve strength/tolerance for activity to enable patient to 

perform ADL's.





OT Long Term Goals


Long Term Goals


Time Frame:  Jun 16, 2017


Eating (FIM):  6


Grooming(FIM):  6


Bathing(FIM):  5


Upper Body Dressing(FIM):  6


Lower Body Dressing(FIM):  6


Toileting(FIM):  6


Toilet/Commode Transfer(FIM):  6


Additional Goals:  1-Demonstrate ADL Tasks, 2-Verbalize Understanding, 3-

ImproveStrength/Cha


1=Demonstrate adherence to instructed precautions during ADL tasks.


2=Patient will verbalize/demonstrate understanding of assistive devices/

modifications for ADL.


3=Patient will improve strength/tolerance for activity to enable patient to 

perform ADL's.





OT Education/Plan


Problem List/Assessment


Assessment:  Decreased UE Strength, Dependent Transfers, Impaired Self-Care 

Skills


Pt to benefit from skilled OT intervention for ADL training, transfers, 

strengthening, and home safety education to maximize level of function and 

allow safe discharge plan.





Discharge Recommendations


Plan/Recommendations:  Continue POC





Treatment Plan/Plan of Care


Treatment,Training & Education:  Yes


Patient would benefit from OT for education, treatment and training to promote 

independence in ADL's, mobility, safety and/or upper extremity function for ADL'

s.


Plan of Care:  ADL Retraining, Functional Mobility, UE Funct Exercise/Act


Treatment Duration:  Jun 16, 2017


# of days/week


5


Visits Per Week:  5


Agreement:  Yes


Rehab Potential:  Fair





Time/GCodes


Start Time:  11:00


Stop Time:  11:30


Total Time Billed (hr/min):  30


Billed Treatment Time


1 visit, EVL(15minutes), ADL(15minutes)





PT/OT Therapy GCodes


Therapy


Functional Limitation:  Occupational Therapy


Test(s)/Tool used to determine:  FIM, Level of Assistance Scale





Functional Limitation-Current


Charge Code:  SELFCUR


Modifier:  CK





Functional Limitation-Goal


Charge Code:  SELFGOAL


Modifier:  FAUSTINO MENJIVAR OT Jun 9, 2017 13:47

## 2017-06-09 NOTE — HISTORY & PHYSICIAL
History of Present Illness


History of Present Illness


Reason for visit/HPI


HOME HEALTH CALLED ems to bring patient to the emergency .


Patient confused.


Patient has cellulitis of the legs


Patient has fatigue and unable to take care of herself  Patient at 2 person 

assist.


Patient on 17 discharged from rehabilitation..


In the emergency room patient confused and stated she will go to a nursing home.


Patient has history of hallucinating according to home health.


Patient this morning wants to go home and not nursing  home.


patient wants discharged today


Date of Admission


2017 at 18:30


Time Seen by Provider:  07:10


I consulted on this patient on


17


 07:22


Attending Physician


Edvin Dacosta DO


Admitting Physician


Edvin Dacosta DO


Consult








Allergies and Home Medications


Allergies


Coded Allergies:  


     No Known Drug Allergies (Unverified , 3/11/14)





Home Medications


Acetaminophen 500 Mg Tablet, 500 MG PO QID PRN for PAIN-MILD for 30 Days, #100


   Prescribed by: ZHANG RODAS on 17 0935


Alprazolam 0.25 Mg Tablet, 0.25 MG PO BID PRN for ANXIETY, (Reported)


Cefdinir 300 Mg Capsule, #14 (Reported)


Docusate Sodium 100 Mg Capsule, 100 MG PO BID for 30 Days, #60


   Prescribed by: ZHANG RODAS on 17 0935


Duloxetine HCl 30 Mg Capsule.dr, 30 MG PO DAILY, (Reported)


Esomeprazole Magnesium 40 Mg Cap, 40 MG PO DAILY, (Reported)


   TAKES BEFORE BREAKFAST 


Furosemide 40 Mg Tablet, 40 MG PO DAILY, (Reported)


Insulin Aspart 300 Units/3 Ml Solution, SQ QID, (Reported)


    = 0 UNITS 201-250 = 3 UNITS 251-300 = 5 UNITS 301-350 = 7 UNITS 351-

400 = 9 UNITS 


Iron Polysaccharide Complex 150 Mg Capsule, 150 MG PO BID, (Reported)


Loratadine 10 Mg Tablet, 10 MG PO DAILY, (Reported)


Metformin HCl 1,000 Mg Tablet, 1,000 MG PO BID, (Reported)


Metoprolol Succinate 50 Mg Tab.er.24h, 50 MG PO DAILY, (Reported)


Olanzapine 2.5 Mg Tablet, 2.5 MG PO DAILY for 30 Days, #30


   Prescribed by: ZHANG RODAS on 17 0935


Polyethylene Glycol 3350 17 Gm Powd.pack, 17 GM PO DAILY PRN for CONSTIPATION, (

Reported)


Pregabalin 150 Mg Capsule, 150 MG PO TID, (Reported)


Ropinirole HCl 2 Mg Tablet, 2 MG PO BID, (Reported)


Rosuvastatin Calcium 10 Mg Tablet, 10 MG PO DAILY, (Reported)


   LAST FILLED 17 #30  


Solifenacin Succinate 10 Mg Tablet, 10 MG PO DAILY, (Reported)


Tramadol HCl 50 Mg Tablet, 50 MG PO Q8H, (Reported)





Past Medical-Social-Family Hx


Patient Social History


Marrital Status:  


Alcohol Use:  Denies Use


Recreational Drug Use:  No


Smoking Status:  Former Smoker


Former smoker/When Quit:  1992


Type Used:  Cigarettes


Physical Abuse Screen:  No


Sexual Abuse:  No


Recent Foreign Travel:  No


Contact w/other who traveled:  No


Recent Hopitalizations:  Yes


Recent Infectious Disease Expo:  No





Immunizations Up To Date


Tetanus Booster (TDap):  Less than 5yrs


Date of Pneumonia Vaccine:  Sep 1, 2016


Date of Influenza Vaccine:  Sep 18, 2016





Seasonal Allergies


Seasonal Allergies:  No





Surgeries


HX Surgeries:  Yes (HEMORRHOIDECTOMY, L HIP replacement, reduction left hip 

dislocation x2)


Surgeries:  Gallbladder, Joint Replacement, Orthopedic





Respiratory


Hx Respiratory Disorders:  No





Cardiovascular


Hx Cardiovascular Disorders:  Yes (CHF)


Cardiac Disorders:  Hypertension





Neurological


Hx Neurological Disorders:  Yes (RLS)


Neurological Disorders:  Neuropathy





Reproductive System


Hx Reproductive Disorders:  No


Sexually Transmitted Disease:  No


HIV/AIDS:  No





Genitourinary


Hx Genitourinary Disorders:  No





Gastrointestinal


Hx Gastrointestinal Disorders:  Yes


Gastrointestinal Disorders:  Hemorrhoids





Musculoskeletal


Hx Musculoskeletal Disorders:  Yes (school age /  accident)


Musculoskeletal Disorders:  Chronic Back Pain





Endocrine


Hx Endocrine Disorders:  Yes (Lantus/ Novolog)


Endocrine Disorders:  Diabetes, Insulin dep





HEENT


HX ENT Disorders:  Yes (report the start of cataracts, migrains)


HEENT Disorders:  Cataract


Loss of Vision:  Denies


Hearing Impairment:  Denies





Cancer


Hx Cancer:  No





Psychosocial


Hx Psychiatric Problems:  Yes


Behavioral Health Disorders:  Anxiety, Depression





Integumentary


HX Skin/Integumentary Disorder:  Yes (SEEING WOUND CARE FOR WOUND ON FEET, 

history of cellulitis)





Blood Transfusions


Hx Blood Disorders:  No


Adverse Reaction to a Blood Tr:  No





Reviewed Nursing Assessment


Reviewed/Agree w Nursing PMH:  Yes





Family Medical History


Significant Family History:  No Pertinent Family Hx


Family Hx:  


Congestive heart failure


  03 FATHER


Prostate cancer


  03 FATHER





No Family History of:


  Abdominal aortic aneurysm


  Grandy's disease


  Alcoholism


  Aphasia


  Cancer


  Cancer of colon


  Cataract


  Congenital heart disease


  Cystic fibrosis


  Dementia


  Dysphagia


  Family history: Allergy


  Family history: Alzheimer's disease


  Family history: Arthritis


  Family history: Asthma


  Family history: Breast disease


  Family history: Cardiovascular disease


  Family history: Coronary thrombosis


  Family history: Diabetes mellitus


  Family history: Gastrointestinal disease


  Family history: Glaucoma


  Family history: Hypertension


  Family history: Osteoporosis


  Family history: Thyroid disorder


  Headache


  Hearing loss


  Heart disease


  Hereditary disease


  History of - anemia


  History of - disorder


  History of - respiratory disease


  History of drug abuse


  Human immunodeficiency virus (HIV) seropositivity


  Hypercholesterolemia


  Infertile


  Kidney disease


  Malignant neoplasm of lung


  Myocardial infarction


  Parkinson's disease


  Psychotic disorder


  Seizure disorder


  Stroke


  Tuberculosis


  Visual impairment





Constitutional:  weakness


EENTM:  no symptoms reported


Respiratory:  no symptoms reported


Cardiovascular:  no symptoms reported


Gastrointestinal:  no symptoms reported


Genitourinary:  no symptoms reported





Physical Exam


Vital Signs





Vital Sign - Last 12Hours








 17





 15:41


 


Temp 98.3


 


Pulse 64


 


Resp 18


 


B/P (MAP) 95/52


 


Pulse Ox 100


 


O2 Delivery Room Air





Capillary Refill : Less Than 3 Seconds


General Appearance:  No Apparent Distress, WD/WN


Eyes:  Bilateral Eye Normal Inspection


HEENT:  Normal ENT Inspection


Neck:  Full Range of Motion, Normal Inspection


Respiratory:  Chest Non Tender, Lungs Clear, Normal Breath Sounds, No Accessory 

Muscle Use, No Respiratory Distress


Cardiovascular:  Regular Rate, Rhythm, No Murmur


Gastrointestinal:  Non Tender, Soft





Assessment/Plan


Assessment and Plan


unsteady gait.


2 person assist.


Weakness.


Fatigue.


Diabetes.


  Cellulitis of legs


Confusion


Problems:  





Clinical Quality Measures


DVT/VTE Risk/Contraindication:


Risk Factor Score Per Nursin


RFS Level Per Nursing on Admit:  4+=Very High











EDVIN DACOSTA DO 2017 07:28

## 2017-06-10 VITALS — SYSTOLIC BLOOD PRESSURE: 159 MMHG | DIASTOLIC BLOOD PRESSURE: 79 MMHG

## 2017-06-10 VITALS — SYSTOLIC BLOOD PRESSURE: 127 MMHG | DIASTOLIC BLOOD PRESSURE: 60 MMHG

## 2017-06-10 VITALS — SYSTOLIC BLOOD PRESSURE: 140 MMHG | DIASTOLIC BLOOD PRESSURE: 63 MMHG

## 2017-06-10 LAB
ANION GAP SERPL CALC-SCNC: 11 MMOL/L (ref 5–14)
BUN SERPL-MCNC: 12 MG/DL (ref 7–18)
BUN/CREAT SERPL: 13
CALCIUM SERPL-MCNC: 9.4 MG/DL (ref 8.5–10.1)
CHLORIDE SERPL-SCNC: 108 MMOL/L (ref 98–107)
CO2 SERPL-SCNC: 23 MMOL/L (ref 21–32)
CREAT SERPL-MCNC: 0.94 MG/DL (ref 0.6–1.3)
ERYTHROCYTE [DISTWIDTH] IN BLOOD BY AUTOMATED COUNT: 15.2 % (ref 10–14.5)
GFR SERPLBLD BASED ON 1.73 SQ M-ARVRAT: 59 ML/MIN
GLUCOSE SERPL-MCNC: 137 MG/DL (ref 70–105)
MCH RBC QN AUTO: 27 PG (ref 25–34)
MCHC RBC AUTO-ENTMCNC: 32 G/DL (ref 32–36)
MCV RBC AUTO: 85 FL (ref 80–99)
PLATELET # BLD: 235 10^3/UL (ref 130–400)
PMV BLD AUTO: 11.1 FL (ref 7.4–10.4)
POTASSIUM SERPL-SCNC: 4.6 MMOL/L (ref 3.6–5)
RBC # BLD AUTO: 3.94 10^6/UL (ref 4.35–5.85)
SODIUM SERPL-SCNC: 142 MMOL/L (ref 135–145)
WBC # BLD AUTO: 6.4 10^3/UL (ref 4.3–11)

## 2017-06-10 RX ADMIN — CLINDAMYCIN PHOSPHATE SCH MLS/HR: 150 INJECTION, SOLUTION INTRAVENOUS at 05:51

## 2017-06-10 RX ADMIN — ALPRAZOLAM PRN MG: 0.25 TABLET ORAL at 10:30

## 2017-06-10 RX ADMIN — METFORMIN HYDROCHLORIDE SCH MG: 500 TABLET, FILM COATED ORAL at 16:08

## 2017-06-10 RX ADMIN — Medication SCH MG: at 09:14

## 2017-06-10 RX ADMIN — INSULIN HUMAN SCH UNIT: 100 INJECTION, SOLUTION PARENTERAL at 22:01

## 2017-06-10 RX ADMIN — FUROSEMIDE SCH MG: 40 TABLET ORAL at 09:14

## 2017-06-10 RX ADMIN — CLINDAMYCIN PHOSPHATE SCH MLS/HR: 150 INJECTION, SOLUTION INTRAVENOUS at 22:01

## 2017-06-10 RX ADMIN — Medication SCH ML: at 16:08

## 2017-06-10 RX ADMIN — SODIUM CHLORIDE SCH MLS/HR: 900 INJECTION INTRAVENOUS at 09:21

## 2017-06-10 RX ADMIN — PANTOPRAZOLE SODIUM SCH MG: 40 TABLET, DELAYED RELEASE ORAL at 06:50

## 2017-06-10 RX ADMIN — ALPRAZOLAM PRN MG: 0.25 TABLET ORAL at 19:49

## 2017-06-10 RX ADMIN — PREGABALIN SCH MG: 75 CAPSULE ORAL at 09:14

## 2017-06-10 RX ADMIN — METOPROLOL SUCCINATE SCH MG: 50 TABLET, EXTENDED RELEASE ORAL at 09:20

## 2017-06-10 RX ADMIN — INSULIN HUMAN SCH UNIT: 100 INJECTION, SOLUTION PARENTERAL at 16:10

## 2017-06-10 RX ADMIN — PREGABALIN SCH MG: 75 CAPSULE ORAL at 15:54

## 2017-06-10 RX ADMIN — MICONAZOLE NITRATE SCH APPLIC: 20 POWDER TOPICAL at 09:23

## 2017-06-10 RX ADMIN — PREGABALIN SCH MG: 75 CAPSULE ORAL at 22:02

## 2017-06-10 RX ADMIN — ROPINIROLE SCH MG: 1 TABLET ORAL at 09:14

## 2017-06-10 RX ADMIN — DOCUSATE SODIUM SCH MG: 100 CAPSULE ORAL at 22:02

## 2017-06-10 RX ADMIN — OLANZAPINE SCH MG: 2.5 TABLET, FILM COATED ORAL at 09:14

## 2017-06-10 RX ADMIN — ENOXAPARIN SODIUM SCH MG: 100 INJECTION SUBCUTANEOUS at 06:50

## 2017-06-10 RX ADMIN — Medication SCH ML: at 22:02

## 2017-06-10 RX ADMIN — METFORMIN HYDROCHLORIDE SCH MG: 500 TABLET, FILM COATED ORAL at 06:50

## 2017-06-10 RX ADMIN — ROPINIROLE SCH MG: 1 TABLET ORAL at 22:02

## 2017-06-10 RX ADMIN — Medication SCH MG: at 22:01

## 2017-06-10 RX ADMIN — ATORVASTATIN CALCIUM SCH MG: 20 TABLET, FILM COATED ORAL at 22:02

## 2017-06-10 RX ADMIN — CLINDAMYCIN PHOSPHATE SCH MLS/HR: 150 INJECTION, SOLUTION INTRAVENOUS at 15:53

## 2017-06-10 RX ADMIN — TROSPIUM CHLORIDE SCH MG: 20 TABLET, FILM COATED ORAL at 15:53

## 2017-06-10 RX ADMIN — DOCUSATE SODIUM SCH MG: 100 CAPSULE ORAL at 09:14

## 2017-06-10 RX ADMIN — Medication SCH ML: at 05:52

## 2017-06-10 RX ADMIN — TROSPIUM CHLORIDE SCH MG: 20 TABLET, FILM COATED ORAL at 06:55

## 2017-06-10 RX ADMIN — LORATADINE SCH MG: 10 TABLET ORAL at 09:14

## 2017-06-10 RX ADMIN — DULOXETINE HYDROCHLORIDE SCH MG: 30 CAPSULE, DELAYED RELEASE ORAL at 09:20

## 2017-06-10 NOTE — PROGRESS NOTE (SOAP)
Subjective


Date Seen by Provider:  Sukhjinder 10, 2017


Time Seen by Provider:  13:06


Subjective/Events-last exam


Fwup bilateral LE cellulitis, weakness with falls, DMII.  Sitting up in chair 

eating lunch with no complaints.





Objective


Exam





Vital Signs








  Date Time  Temp Pulse Resp B/P (MAP) Pulse Ox O2 Delivery O2 Flow Rate FiO2


 


6/10/17 08:00 97.9 64 16 159/79 99 Room Air  


 


6/10/17 04:00 96.4 72 18 127/60 97 Room Air  


 


17 23:40 96.3 80 20 162/68 97 Room Air  


 


17 21:00      Room Air  


 


17 19:10 99.6 77 18 129/71 95 Room Air  


 


17 18:55      Room Air  


 


17 17:53 97.5       


 


17 16:00 98.7 101 24 139/62 93 Room Air  














I & O 


 


 6/10/17





 07:00


 


Intake Total 2592 ml


 


Output Total 4525 ml


 


Balance -1933 ml





Capillary Refill : Less Than 3 SecondsLess Than 3 Seconds


General Appearance:  No Apparent Distress


Neck:  Supple


Respiratory:  Lungs Clear


Cardiovascular:  Regular Rate, Rhythm


Extremity:  No Calf Tenderness, Pedal Edema (2 plus)


Neurologic/Psychiatric:  Alert, Oriented x3


Skin:  Erythema (to LEs--decreasing from previous marked lines)





Results


Lab


Laboratory Tests


6/10/17 05:34: 


White Blood Count 6.4, Red Blood Count 3.94L, Hemoglobin 10.5L, Hematocrit 33L, 

Mean Corpuscular Volume 85, Mean Corpuscular Hemoglobin 27, Mean Corpuscular 

Hemoglobin Concent 32, Red Cell Distribution Width 15.2H, Platelet Count 235, 

Mean Platelet Volume 11.1H, Sodium Level 142, Potassium Level 4.6, Chloride 

Level 108H, Carbon Dioxide Level 23, Anion Gap 11, Blood Urea Nitrogen 12, 

Creatinine 0.94, Estimat Glomerular Filtration Rate 59, BUN/Creatinine Ratio 13

, Glucose Level 137H, Calcium Level 9.4





Assessment/Plan


Assessment/Plan


Assess & Plan/Chief Complaint


1.  Bilateral LE Cellulitis--continue Cleocin, DC rocephin, Elevate LEs, CBC in 

AM


2.  Diabetes mellitus II--Add accuchecks with SSI A


3.  Weakness with falls--continue PT and possible NH placement on DC





Clinical Quality Measures


DVT/VTE Risk/Contraindication:


Risk Factor Score Per Nursin


RFS Level Per Nursing on Admit:  4+=Very High


Contraindications-Mechi:  Other *list below*











ELVIS HUSSEIN DO Sukhjinder 10, 2017 13:10

## 2017-06-10 NOTE — PHYSICAL THERAPY PROGRESS NOTE
Therapy Progress Note


Pt refused treatment.  Attempted to perform supine exercises, but pt malinda her 

legs up and refused to move. 1051











BACILIO SCHULTZ PT Sukhjinder 10, 2017 12:49

## 2017-06-10 NOTE — WOUND CARE PROGRESS NOTE
Subjective


Subjective


Subjective/Events-last exam


67 year old female admitted for confusion and possible UTI, noted to have 

bilateral calf stasis dermatitis and intertrigo beneath breasts and abdominal 

folds.





Objective


Exam


Last Set of Vital Signs





Vital Signs








  Date Time  Temp Pulse Resp B/P (MAP) Pulse Ox O2 Delivery O2 Flow Rate FiO2


 


6/10/17 15:55 99.3 71 16 140/63 97 Room Air  





Capillary Refill : Less Than 3 SecondsLess Than 3 Seconds


I&O











Intake and Output 


 


 6/10/17





 00:00


 


Intake Total 2738 ml


 


Output Total 4525 ml


 


Balance -1787 ml


 


 


 


Intake Oral 1680 ml


 


IV Total 1058 ml


 


Output Urine Total 4525 ml


 


# Voids 1


 


# Bowel Movements 1











Results


Lab


Laboratory Tests


6/10/17 05:34: 


White Blood Count 6.4, Red Blood Count 3.94L, Hemoglobin 10.5L, Hematocrit 33L, 

Mean Corpuscular Volume 85, Mean Corpuscular Hemoglobin 27, Mean Corpuscular 

Hemoglobin Concent 32, Red Cell Distribution Width 15.2H, Platelet Count 235, 

Mean Platelet Volume 11.1H, Sodium Level 142, Potassium Level 4.6, Chloride 

Level 108H, Carbon Dioxide Level 23, Anion Gap 11, Blood Urea Nitrogen 12, 

Creatinine 0.94, Estimat Glomerular Filtration Rate 59, BUN/Creatinine Ratio 13

, Glucose Level 137H, Calcium Level 9.4


6/10/17 15:59: Glucometer 165H











ROSHNI MOREIRA MD Sukhjinder 10, 2017 18:28

## 2017-06-11 VITALS — DIASTOLIC BLOOD PRESSURE: 78 MMHG | SYSTOLIC BLOOD PRESSURE: 133 MMHG

## 2017-06-11 VITALS — SYSTOLIC BLOOD PRESSURE: 138 MMHG | DIASTOLIC BLOOD PRESSURE: 74 MMHG

## 2017-06-11 VITALS — DIASTOLIC BLOOD PRESSURE: 75 MMHG | SYSTOLIC BLOOD PRESSURE: 144 MMHG

## 2017-06-11 VITALS — SYSTOLIC BLOOD PRESSURE: 148 MMHG | DIASTOLIC BLOOD PRESSURE: 64 MMHG

## 2017-06-11 RX ADMIN — DULOXETINE HYDROCHLORIDE SCH MG: 30 CAPSULE, DELAYED RELEASE ORAL at 12:07

## 2017-06-11 RX ADMIN — PANTOPRAZOLE SODIUM SCH MG: 40 TABLET, DELAYED RELEASE ORAL at 06:26

## 2017-06-11 RX ADMIN — MICONAZOLE NITRATE SCH APPLIC: 20 POWDER TOPICAL at 09:10

## 2017-06-11 RX ADMIN — Medication SCH ML: at 06:27

## 2017-06-11 RX ADMIN — INSULIN HUMAN SCH UNIT: 100 INJECTION, SOLUTION PARENTERAL at 11:55

## 2017-06-11 RX ADMIN — ROPINIROLE SCH MG: 1 TABLET ORAL at 09:01

## 2017-06-11 RX ADMIN — CLINDAMYCIN PHOSPHATE SCH MLS/HR: 150 INJECTION, SOLUTION INTRAVENOUS at 14:47

## 2017-06-11 RX ADMIN — DOCUSATE SODIUM SCH MG: 100 CAPSULE ORAL at 09:01

## 2017-06-11 RX ADMIN — Medication SCH MG: at 21:33

## 2017-06-11 RX ADMIN — PREGABALIN SCH MG: 75 CAPSULE ORAL at 09:02

## 2017-06-11 RX ADMIN — INSULIN HUMAN SCH UNIT: 100 INJECTION, SOLUTION PARENTERAL at 21:33

## 2017-06-11 RX ADMIN — Medication SCH MG: at 09:02

## 2017-06-11 RX ADMIN — METFORMIN HYDROCHLORIDE SCH MG: 500 TABLET, FILM COATED ORAL at 17:46

## 2017-06-11 RX ADMIN — ATORVASTATIN CALCIUM SCH MG: 20 TABLET, FILM COATED ORAL at 21:33

## 2017-06-11 RX ADMIN — FUROSEMIDE SCH MG: 40 TABLET ORAL at 09:02

## 2017-06-11 RX ADMIN — Medication SCH ML: at 14:47

## 2017-06-11 RX ADMIN — TROSPIUM CHLORIDE SCH MG: 20 TABLET, FILM COATED ORAL at 15:33

## 2017-06-11 RX ADMIN — ROPINIROLE SCH MG: 1 TABLET ORAL at 21:32

## 2017-06-11 RX ADMIN — INSULIN HUMAN SCH UNIT: 100 INJECTION, SOLUTION PARENTERAL at 16:20

## 2017-06-11 RX ADMIN — PREGABALIN SCH MG: 75 CAPSULE ORAL at 21:33

## 2017-06-11 RX ADMIN — METFORMIN HYDROCHLORIDE SCH MG: 500 TABLET, FILM COATED ORAL at 06:26

## 2017-06-11 RX ADMIN — OLANZAPINE SCH MG: 2.5 TABLET, FILM COATED ORAL at 09:01

## 2017-06-11 RX ADMIN — INSULIN HUMAN SCH UNIT: 100 INJECTION, SOLUTION PARENTERAL at 05:38

## 2017-06-11 RX ADMIN — CLINDAMYCIN PHOSPHATE SCH MLS/HR: 150 INJECTION, SOLUTION INTRAVENOUS at 06:26

## 2017-06-11 RX ADMIN — TROSPIUM CHLORIDE SCH MG: 20 TABLET, FILM COATED ORAL at 06:31

## 2017-06-11 RX ADMIN — CLINDAMYCIN PHOSPHATE SCH MLS/HR: 150 INJECTION, SOLUTION INTRAVENOUS at 21:33

## 2017-06-11 RX ADMIN — Medication SCH ML: at 21:33

## 2017-06-11 RX ADMIN — DOCUSATE SODIUM SCH MG: 100 CAPSULE ORAL at 21:32

## 2017-06-11 RX ADMIN — ENOXAPARIN SODIUM SCH MG: 100 INJECTION SUBCUTANEOUS at 06:48

## 2017-06-11 RX ADMIN — PREGABALIN SCH MG: 75 CAPSULE ORAL at 12:08

## 2017-06-11 RX ADMIN — LORATADINE SCH MG: 10 TABLET ORAL at 09:02

## 2017-06-11 RX ADMIN — METOPROLOL SUCCINATE SCH MG: 50 TABLET, EXTENDED RELEASE ORAL at 09:02

## 2017-06-11 NOTE — PROGRESS NOTE (SOAP)
Subjective


Date Seen by Provider:  2017


Time Seen by Provider:  12:00


Subjective/Events-last exam


Fwup bilateral LE cellulitis, weakness with falls, DMII.   Up in chair with 

feet up.  Feeling better.  In isolation because found 1 bed bug yesterday.


Review of Systems


General:  Fatigue


Neurological:  Weakness





Objective


Exam





Vital Signs








  Date Time  Temp Pulse Resp B/P (MAP) Pulse Ox O2 Delivery O2 Flow Rate FiO2


 


17 09:00      Room Air  


 


17 08:00 96.2 62 18 148/64 98 Room Air  


 


17 07:14     96   


 


17 00:00 97.2 59 20 144/75 96 Room Air  


 


6/10/17 21:00      Room Air  


 


6/10/17 18:50      Room Air  


 


6/10/17 15:55 99.3 71 16 140/63 97 Room Air  














I & O 


 


 17





 07:00


 


Intake Total 2838 ml


 


Output Total 1450 ml


 


Balance 1388 ml





Capillary Refill : Less Than 3 SecondsLess Than 3 Seconds


General Appearance:  No Apparent Distress


Neck:  Supple


Respiratory:  Lungs Clear


Cardiovascular:  Regular Rate, Rhythm


Extremity:  Non Tender, No Calf Tenderness, Pedal Edema (nonpitting but less 

then yesterday)


Neurologic/Psychiatric:  Alert, Oriented x3


Skin:  Erythema (to bilateral LEs improving)





Results


Lab


Laboratory Tests


6/10/17 15:59: Glucometer 165H


6/10/17 21:59: Glucometer 128H


17 05:34: Glucometer 109





Assessment/Plan


Assessment/Plan


Assess & Plan/Chief Complaint


1.  Bilateral LE Cellulitis--continue Cleocin, continue to  Elevate LEs, CBC in 

AM


2.  Diabetes mellitus II--continue accuchecks with SSI A


3.  Weakness with falls--continue PT and possible NH placement on DC but will 

let Dr. Sullivan address tomorrow





Clinical Quality Measures


DVT/VTE Risk/Contraindication:


Risk Factor Score Per Nursin


RFS Level Per Nursing on Admit:  4+=Very High


Contraindications-Mechi:  Other *list below*











ELVIS HUSSEIN DO 2017 12:05

## 2017-06-12 LAB
BASOPHILS # BLD AUTO: 0.1 10^3/UL (ref 0–0.1)
BASOPHILS NFR BLD AUTO: 2 % (ref 0–10)
EOSINOPHIL # BLD AUTO: 0.6 10^3/UL (ref 0–0.3)
EOSINOPHIL NFR BLD AUTO: 8 % (ref 0–10)
ERYTHROCYTE [DISTWIDTH] IN BLOOD BY AUTOMATED COUNT: 15.3 % (ref 10–14.5)
LYMPHOCYTES # BLD AUTO: 1.7 X 10^3 (ref 1–4)
LYMPHOCYTES NFR BLD AUTO: 23 % (ref 12–44)
MCH RBC QN AUTO: 26 PG (ref 25–34)
MCHC RBC AUTO-ENTMCNC: 31 G/DL (ref 32–36)
MCV RBC AUTO: 84 FL (ref 80–99)
MONOCYTES # BLD AUTO: 0.8 X 10^3 (ref 0–1)
MONOCYTES NFR BLD AUTO: 11 % (ref 0–12)
NEUTROPHILS # BLD AUTO: 4.2 X 10^3 (ref 1.8–7.8)
NEUTROPHILS NFR BLD AUTO: 58 % (ref 42–75)
PLATELET # BLD: 252 10^3/UL (ref 130–400)
PMV BLD AUTO: 11.3 FL (ref 7.4–10.4)
RBC # BLD AUTO: 4.29 10^6/UL (ref 4.35–5.85)
WBC # BLD AUTO: 7.3 10^3/UL (ref 4.3–11)

## 2017-06-12 RX ADMIN — ROPINIROLE SCH MG: 1 TABLET ORAL at 09:20

## 2017-06-12 RX ADMIN — METOPROLOL SUCCINATE SCH MG: 50 TABLET, EXTENDED RELEASE ORAL at 09:20

## 2017-06-12 RX ADMIN — CLINDAMYCIN PHOSPHATE SCH MLS/HR: 150 INJECTION, SOLUTION INTRAVENOUS at 06:14

## 2017-06-12 RX ADMIN — Medication SCH ML: at 06:14

## 2017-06-12 RX ADMIN — INSULIN HUMAN SCH UNIT: 100 INJECTION, SOLUTION PARENTERAL at 05:55

## 2017-06-12 RX ADMIN — ACETAMINOPHEN PRN MG: 500 TABLET ORAL at 06:13

## 2017-06-12 RX ADMIN — METFORMIN HYDROCHLORIDE SCH MG: 500 TABLET, FILM COATED ORAL at 06:12

## 2017-06-12 RX ADMIN — MICONAZOLE NITRATE SCH APPLIC: 20 POWDER TOPICAL at 09:21

## 2017-06-12 RX ADMIN — DOCUSATE SODIUM SCH MG: 100 CAPSULE ORAL at 09:20

## 2017-06-12 RX ADMIN — LORATADINE SCH MG: 10 TABLET ORAL at 09:21

## 2017-06-12 RX ADMIN — FUROSEMIDE SCH MG: 40 TABLET ORAL at 09:20

## 2017-06-12 RX ADMIN — PREGABALIN SCH MG: 75 CAPSULE ORAL at 09:20

## 2017-06-12 RX ADMIN — DULOXETINE HYDROCHLORIDE SCH MG: 30 CAPSULE, DELAYED RELEASE ORAL at 09:20

## 2017-06-12 RX ADMIN — Medication SCH MG: at 09:20

## 2017-06-12 RX ADMIN — PANTOPRAZOLE SODIUM SCH MG: 40 TABLET, DELAYED RELEASE ORAL at 06:12

## 2017-06-12 RX ADMIN — OLANZAPINE SCH MG: 2.5 TABLET, FILM COATED ORAL at 09:20

## 2017-06-12 RX ADMIN — INSULIN HUMAN SCH UNIT: 100 INJECTION, SOLUTION PARENTERAL at 11:57

## 2017-06-12 RX ADMIN — TROSPIUM CHLORIDE SCH MG: 20 TABLET, FILM COATED ORAL at 06:22

## 2017-06-12 RX ADMIN — ENOXAPARIN SODIUM SCH MG: 100 INJECTION SUBCUTANEOUS at 06:13

## 2017-06-12 NOTE — PROGRESS NOTE (SOAP)
Subjective


Time Seen by Provider:  07:55


Subjective/Events-last exam


unsteady gait.


Weakness.


Fatigue.


Diabetes.


Bedbug found.


 involved for discharge.


Face-to-face done for home health.





Objective


Exam





Vital Signs








  Date Time  Temp Pulse Resp B/P (MAP) Pulse Ox O2 Delivery O2 Flow Rate FiO2


 


17 23:54 98.6 72 18 138/74 98 Room Air  


 


17 20:30      Room Air  


 


17 18:56      Room Air  


 


17 15:40 96.4 65 16 133/78 99 Room Air  


 


17 09:00      Room Air  


 


17 08:00 96.2 62 18 148/64 98 Room Air  














I & O 


 


 17





 07:00


 


Intake Total 2054 ml


 


Output Total 600 ml


 


Balance 1454 ml





Capillary Refill : Less Than 3 SecondsLess Than 3 Seconds


General Appearance:  No Apparent Distress, WD/WN


HEENT:  Normal ENT Inspection


Neck:  Normal Inspection


Respiratory:  Chest Non Tender, Lungs Clear, Normal Breath Sounds, No Accessory 

Muscle Use, No Respiratory Distress


Cardiovascular:  Regular Rate, Rhythm, No Murmur





Results


Lab


Laboratory Tests


17 04:10








Laboratory Tests


17 11:23: Glucometer 140H


17 16:14: Glucometer 118H


17 21:03: Glucometer 192H


17 04:10: 


White Blood Count 7.3, Red Blood Count 4.29L, Hemoglobin 11.3L, Hematocrit 36, 

Mean Corpuscular Volume 84, Mean Corpuscular Hemoglobin 26, Mean Corpuscular 

Hemoglobin Concent 31L, Red Cell Distribution Width 15.3H, Platelet Count 252, 

Mean Platelet Volume 11.3H, Neutrophils (%) (Auto) 58, Lymphocytes (%) (Auto) 23

, Monocytes (%) (Auto) 11, Eosinophils (%) (Auto) 8, Basophils (%) (Auto) 2, 

Neutrophils # (Auto) 4.2, Lymphocytes # (Auto) 1.7, Monocytes # (Auto) 0.8, 

Eosinophils # (Auto) 0.6H, Basophils # (Auto) 0.1


17 05:36: Glucometer 138H





Assessment/Plan


Assessment/Plan


Assess & Plan/Chief Complaint


unsteady gait.


Weakness.


Fatigue..


Cellulitis of legs better.


 involved for discharge


Patient wants to go home with home health.


Face-to-face done





Clinical Quality Measures


DVT/VTE Risk/Contraindication:


Risk Factor Score Per Nursin


RFS Level Per Nursing on Admit:  4+=Very High


Contraindications-Mechi:  Other *list below*











LELAND DACOSTA DO 2017 08:00

## 2017-06-12 NOTE — D/C HH FACE TO FACE ORDER
Discharge Inst-to Home Health


Patient Instructions


Patient Instructions/FollowUp:  


Nurse to set up medicines


Patient Problems:  


Weakness, 


Unsteady gait


Unable to take care of medicines


VIA [place], [place]


DISCHARGE ORDERS


Allergies:  


Coded Allergies:  


     No Known Drug Allergies (Unverified , 3/11/14)


Height (Feet):  5


Height (Inches):  6.00


Weight (Pounds):  221


Weight (Ounces):  6.0





Home Health Need/Face to Face


Reason Pt Homebound


Unsteady gait


Weakness


Date of Face to Face:  Jun 12, 2017


Discharged To:  Home


Diagnosis/Conditions HH Order:  


Unsteady gait


Weakness


Diabetes


Cellulitis of legs





Consult/Follow Up/New Order


*I certify that based on my findings, the following services are medically 

necessary Home Health Services:


Services:  Nursing Services


My clinical findings support the need for the above services; see Diagnosis.


I certify that this patient is under my care and that I, a nurse practitioner 

or a physician; a assistant working with me, had a face to face encounter that -

meets the physician face to face encounter requirements with this patient as 

dated.











LELAND DACOSTA DO Jun 12, 2017 10:02

## 2017-09-25 ENCOUNTER — HOSPITAL ENCOUNTER (OUTPATIENT)
Dept: HOSPITAL 75 - RAD | Age: 67
End: 2017-09-25
Attending: FAMILY MEDICINE
Payer: MEDICARE

## 2017-09-25 DIAGNOSIS — R22.43: Primary | ICD-10-CM

## 2017-09-25 DIAGNOSIS — I73.9: ICD-10-CM

## 2017-09-25 DIAGNOSIS — E11.40: ICD-10-CM

## 2017-09-25 PROCEDURE — 93925 LOWER EXTREMITY STUDY: CPT

## 2017-09-25 NOTE — DIAGNOSTIC IMAGING REPORT
PROCEDURE: US Bilateral lower extremity arterial.



TECHNIQUE:   Multiple real-time grayscale images are obtained

through both lower extremity arterial systems with color Doppler

imaging and color Doppler spectral analysis.



INDICATION: Peripheral arterial disease. Bilateral leg swelling

and redness.



FINDINGS: The femoropopliteal segments demonstrate scattered

atherosclerotic plaque bilaterally. There is color flow

demonstrated with patency seen in the common femoral to posterior

tibial and dorsalis pedis arteries on both sides. The profunda

femoris is also patent bilaterally.



In the right lower extremity, flow velocities are in the range of

65 up to 94 cm/s with no evidence of significant stenosis.

Biphasic waveforms are seen throughout. On the left side, flow

velocities are in the range of 62 to 133 cm/s. No evidence of

high-grade stenosis. Biphasic waveforms are seen throughout.



IMPRESSION: There are bilateral biphasic waveforms seen which may

relate to inflow disease. No ultrasound evidence of high-grade

focal stenosis seen.



Dictated by: 



  Dictated on workstation # [id number]

## 2017-12-15 ENCOUNTER — HOSPITAL ENCOUNTER (EMERGENCY)
Dept: HOSPITAL 75 - ER | Age: 67
Discharge: HOME | End: 2017-12-15
Payer: MEDICARE

## 2017-12-15 VITALS — HEIGHT: 64 IN | BODY MASS INDEX: 29.02 KG/M2 | WEIGHT: 170 LBS

## 2017-12-15 VITALS — DIASTOLIC BLOOD PRESSURE: 62 MMHG | SYSTOLIC BLOOD PRESSURE: 121 MMHG

## 2017-12-15 DIAGNOSIS — E11.9: ICD-10-CM

## 2017-12-15 DIAGNOSIS — F41.9: ICD-10-CM

## 2017-12-15 DIAGNOSIS — Z87.19: ICD-10-CM

## 2017-12-15 DIAGNOSIS — Z79.4: ICD-10-CM

## 2017-12-15 DIAGNOSIS — R45.851: Primary | ICD-10-CM

## 2017-12-15 DIAGNOSIS — Z82.49: ICD-10-CM

## 2017-12-15 DIAGNOSIS — I10: ICD-10-CM

## 2017-12-15 DIAGNOSIS — F32.9: ICD-10-CM

## 2017-12-15 DIAGNOSIS — Z87.891: ICD-10-CM

## 2017-12-15 LAB
ANION GAP SERPL CALC-SCNC: 12 MMOL/L (ref 5–14)
BASOPHILS # BLD AUTO: 0.1 10^3/UL (ref 0–0.1)
BASOPHILS NFR BLD AUTO: 1 % (ref 0–10)
BILIRUB UR QL STRIP: NEGATIVE
BUN SERPL-MCNC: 18 MG/DL (ref 7–18)
BUN/CREAT SERPL: 26
CALCIUM SERPL-MCNC: 10 MG/DL (ref 8.5–10.1)
CHLORIDE SERPL-SCNC: 103 MMOL/L (ref 98–107)
CO2 SERPL-SCNC: 25 MMOL/L (ref 21–32)
CREAT SERPL-MCNC: 0.7 MG/DL (ref 0.6–1.3)
EOSINOPHIL # BLD AUTO: 0.5 10^3/UL (ref 0–0.3)
EOSINOPHIL NFR BLD AUTO: 5 % (ref 0–10)
ERYTHROCYTE [DISTWIDTH] IN BLOOD BY AUTOMATED COUNT: 13.1 % (ref 10–14.5)
GFR SERPLBLD BASED ON 1.73 SQ M-ARVRAT: > 60 ML/MIN
GLUCOSE SERPL-MCNC: 134 MG/DL (ref 70–105)
KETONES UR QL STRIP: NEGATIVE
LEUKOCYTE ESTERASE UR QL STRIP: (no result)
LYMPHOCYTES # BLD AUTO: 1 X 10^3 (ref 1–4)
LYMPHOCYTES NFR BLD AUTO: 11 % (ref 12–44)
MCH RBC QN AUTO: 28 PG (ref 25–34)
MCHC RBC AUTO-ENTMCNC: 31 G/DL (ref 32–36)
MCV RBC AUTO: 89 FL (ref 80–99)
MONOCYTES # BLD AUTO: 0.8 X 10^3 (ref 0–1)
MONOCYTES NFR BLD AUTO: 9 % (ref 0–12)
NEUTROPHILS # BLD AUTO: 7.3 X 10^3 (ref 1.8–7.8)
NEUTROPHILS NFR BLD AUTO: 75 % (ref 42–75)
NITRITE UR QL STRIP: NEGATIVE
PH UR STRIP: 6.5 [PH] (ref 5–9)
PLATELET # BLD: 267 10^3/UL (ref 130–400)
PMV BLD AUTO: 10.6 FL (ref 7.4–10.4)
POTASSIUM SERPL-SCNC: 4 MMOL/L (ref 3.6–5)
PROT UR QL STRIP: NEGATIVE
RBC # BLD AUTO: 3.7 10^6/UL (ref 4.35–5.85)
SODIUM SERPL-SCNC: 140 MMOL/L (ref 135–145)
SP GR UR STRIP: 1.01 (ref 1.02–1.02)
SQUAMOUS #/AREA URNS HPF: (no result) /HPF
UROBILINOGEN UR-MCNC: NORMAL MG/DL
WBC # BLD AUTO: 9.8 10^3/UL (ref 4.3–11)
WBC #/AREA URNS HPF: (no result) /HPF

## 2017-12-15 PROCEDURE — 80048 BASIC METABOLIC PNL TOTAL CA: CPT

## 2017-12-15 PROCEDURE — 81000 URINALYSIS NONAUTO W/SCOPE: CPT

## 2017-12-15 PROCEDURE — 99283 EMERGENCY DEPT VISIT LOW MDM: CPT

## 2017-12-15 PROCEDURE — 36415 COLL VENOUS BLD VENIPUNCTURE: CPT

## 2017-12-15 PROCEDURE — 85025 COMPLETE CBC W/AUTO DIFF WBC: CPT

## 2017-12-15 NOTE — ED PSYCHOSOCIAL
General


Stated Complaint:  SUICIDAL


Source:  patient


Exam Limitations:  no limitations





History of Present Illness


Time seen by provider:  14:28


Initial Comments


To ER per EMS from [facility] as reports of suicidal behavior.  

Patient has a history of these outbursts when she becomes upset about 

something.  Today, she held a butter knife to her throat claiming that she was 

going to kill herself.  She has previously been at the [facility] behavioral health 

unit in [facility] for suicidal behaviors.  Upon arrival to the 

emergency room she is alert and oriented and pleasant and states that she was 

just mad was going to kill herself but currently she does not want to kill 

herself or harm herself or anyone else.


Timing/Duration:  just prior to arrival


Severity:  moderate


Associated Symptoms:  suicidal ideation





Allergies and Home Medications


Allergies


Coded Allergies:  


     No Known Drug Allergies (Unverified , 3/11/14)





Home Medications


Desvenlafaxine Succinate 50 Mg Tab.er.24h, 50 MG PO DAILY, (Reported)


Docusate Sodium 100 Mg Capsule, 100 MG PO DAILY, (Reported)


Duloxetine HCl 30 Mg Capsule.dr, 30 MG PO BID, (Reported)


Ferrous Sulfate 325 Mg Tablet, 325 MG PO DAILY, (Reported)


Furosemide 40 Mg Tablet, 40 MG PO DAILY, (Reported)


Insulin Aspart 300 Units/3 Ml Solution, SQ SLIDING/SCALE, (Reported)


    = 0 UNITS 201-250 = 3 UNITS 251-300 = 5 UNITS 301-350 = 7 UNITS 351-

400 = 9 UNITS CALL PHYSICIAN IF GREATER THAN 400 


Lactulose 10 Gm/15 Ml Solution, 30 ML PO DAILY, (Reported)


   LAST FILLED 08/09/17 #946ML 


Loratadine 10 Mg Tablet, 10 MG PO DAILY, (Reported)


Melatonin/Pyridoxine HCl (B6) 1 Each Tablet, 3 MG PO HS, (Reported)


Metformin HCl 1,000 Mg Tablet, 1,000 MG PO BID, (Reported)


Metoprolol Succinate 50 Mg Tab.er.24h, 50 MG PO DAILY, (Reported)


Mirtazapine 15 Mg Tablet, 15 MG PO DAILY, (Reported)


Olanzapine 5 Mg Tablet, 5 MG PO DAILY for 30 Days, #30


   Prescribed by: PRIYA SANCHEZ on 11/3/17 0955


Pantoprazole Sodium 40 Mg Tablet.dr, 40 MG PO DAILY, (Reported)


Pregabalin 150 Mg Capsule, 150 MG PO TID, (Reported)


   LAST FILLED 06/06/17 #90 


Rivastigmine 4.6 Mg Patch, 4.6 MG TD DAILY, (Reported)


Ropinirole HCl 2 Mg Tablet, 2 MG PO BID, (Reported)


Simvastatin 10 Mg Tablet, 10 MG PO HS, (Reported)


Solifenacin Succinate 10 Mg Tablet, 10 MG PO DAILY, (Reported)





Constitutional:  see HPI


EENTM:  see HPI


Respiratory:  no symptoms reported


Cardiovascular:  no symptoms reported


Genitourinary:  no symptoms reported


Musculoskeletal:  no symptoms reported


Skin:  no symptoms reported


Psychiatric/Neurological:  See HPI





Past Medical-Social-Family Hx


Patient Social History


Type Used:  Cigarettes


Former Smoker, Quit:  Jan 1, 1990


2nd Hand Smoke Exposure:  No


Recent Hopitalizations:  Yes





Immunizations Up To Date


Tetanus Booster (TDap):  Less than 5yrs


PED Vaccines UTD:  No


Date of Pneumonia Vaccine:  Sep 1, 2016


Date of Influenza Vaccine:  Oct 1, 2017





Seasonal Allergies


Seasonal Allergies:  No





Surgeries


History of Surgeries:  Yes


Surgeries:  Gallbladder, Joint Replacement, Orthopedic





Respiratory


History of Respiratory Disorde:  No


Currently Using CPAP:  No


Currently Using BIPAP:  No





Cardiovascular


History of Cardiac Disorders:  Yes


Cardiac Disorders:  Hypertension





Neurological


History of Neurological Disord:  Yes


Neurological Disorders:  Neuropathy





Reproductive System


Hx Reproductive Disorders:  No


Sexually Transmitted Disease:  No


HIV/AIDS:  No





Genitourinary


History of Genitourinary Disor:  No





Gastrointestinal


History of Gastrointestinal Di:  No


Gastrointestinal Disorders:  Hemorrhoids





Musculoskeletal


History of Musculoskeletal Dis:  Yes


Musculoskeletal Disorders:  Chronic Back Pain





Endocrine


History of Endocrine Disorders:  Yes


Endocrine Disorders:  Diabetes, Insulin dep





HEENT


History of HEENT Disorders:  Yes


HEENT Disorders:  Cataract


Loss of Vision:  Denies


Hearing Impairment:  Denies





Cancer


History of Cancer:  No


Did You Recieve Any Treatments:  No





Psychosocial


History of Psychiatric Problem:  No


Behavioral Health Disorders:  Anxiety, Depression





Integumentary


History of Skin or Integumenta:  Yes





Blood Transfusions


History of Blood Disorders:  No


Adverse Reaction to a Blood Tr:  No





Family Medical History


Significant Family History:  No Pertinent Family Hx


Family Medial History:  


Congestive heart failure


  03 FATHER


Prostate cancer


  03 FATHER





No Family History of:


  Abdominal aortic aneurysm


  [place]'s disease


  Alcoholism


  Aphasia


  Cancer


  Cancer of colon


  Cataract


  Congenital heart disease


  Cystic fibrosis


  Dementia


  Dysphagia


  Family history: Allergy


  Family history: Alzheimer's disease


  Family history: Arthritis


  Family history: Asthma


  Family history: Breast disease


  Family history: Cardiovascular disease


  Family history: Coronary thrombosis


  Family history: Diabetes mellitus


  Family history: Gastrointestinal disease


  Family history: Glaucoma


  Family history: Hypertension


  Family history: Osteoporosis


  Family history: Thyroid disorder


  Headache


  Hearing loss


  Heart disease


  Hereditary disease


  History of - anemia


  History of - disorder


  History of - respiratory disease


  History of drug abuse


  Human immunodeficiency virus (HIV) seropositivity


  Hypercholesterolemia


  Infertile


  Kidney disease


  Malignant neoplasm of lung


  Myocardial infarction


  Parkinson's disease


  Psychotic disorder


  Seizure disorder


  Stroke


  Tuberculosis


  Visual impairment





Physical Exam


Vital Signs


Capillary Refill :


General Appearance:  WD/WN, no apparent distress


HEENT:  PERRL/EOMI, normal ENT inspection


Neck:  non-tender, full range of motion


Respiratory:  no respiratory distress, no accessory muscle use


Cardiovascular:  regular rate, rhythm, no murmur


Gastrointestinal:  normal bowel sounds, non tender, soft


Neurologic/Psychiatric:  alert, normal mood/affect, oriented x 3


Appearance/Memory:  disheveled, impaired insight


Behavior/Eye Contact:  cooperative, good eye contact


Thoughts/Hallucinations:  no apparent hallucination


Skin:  normal color, warm/dry





Progress/Results/Core Measures


Results/Orders


My Orders





Orders - CHARMAINE MCKENZIE


Cbc With Automated Diff (12/15/17 14:27)


Basic Metabolic Panel (12/15/17 14:27)


Ua Culture If Indicated (12/15/17 14:27)








Departure


Impression


Impression:  


 Primary Impression:  


 Suicidal thoughts


Disposition:  01 HOME, SELF-CARE


Condition:  Stable





Departure-Patient Inst.


Decision time for Depature:  14:31


Referrals:  


LELAND DACOSTA DO (PCP/Family)


Primary Care Physician


Patient Instructions:  Suicide Prevention





Add. Discharge Instructions:  


1.  You may call the senior behavioral health unit at [facility] at 

[phone] and ask for a screening to take placed to see if she qualifies for 

inpatient treatment again.  We have sent to our laboratory evaluation back with 

her.











CHARMAINE MCKENZIE Dec 15, 2017 14:32

## 2018-01-23 ENCOUNTER — HOSPITAL ENCOUNTER (OUTPATIENT)
Dept: HOSPITAL 75 - RAD | Age: 68
End: 2018-01-23
Attending: FAMILY MEDICINE
Payer: MEDICARE

## 2018-01-23 DIAGNOSIS — M89.8X5: Primary | ICD-10-CM

## 2018-01-23 DIAGNOSIS — Z96.641: ICD-10-CM

## 2018-01-23 PROCEDURE — 73502 X-RAY EXAM HIP UNI 2-3 VIEWS: CPT

## 2018-01-23 NOTE — DIAGNOSTIC IMAGING REPORT
INDICATION: Pain.



Two views of the right hip were obtained.



FINDINGS: There are postsurgical changes of right hip

arthroplasty. There are some minimal dystrophic calcifications

along the superolateral aspect of the femoral neck. There is no

fracture or loosening of the hardware. There is no acute fracture

or dislocation. Soft tissues are unremarkable.



IMPRESSION: Unchanged dystrophic calcification along the

superolateral aspect of the femoral neck; otherwise, unremarkable

right hip arthroplasty.



Dictated by: 



  Dictated on workstation # [id number]

## 2018-03-02 ENCOUNTER — HOSPITAL ENCOUNTER (INPATIENT)
Dept: HOSPITAL 75 - ER | Age: 68
LOS: 5 days | Discharge: HOSPICE | DRG: 690 | End: 2018-03-07
Attending: INTERNAL MEDICINE | Admitting: INTERNAL MEDICINE
Payer: MEDICARE

## 2018-03-02 VITALS — DIASTOLIC BLOOD PRESSURE: 119 MMHG | SYSTOLIC BLOOD PRESSURE: 202 MMHG

## 2018-03-02 VITALS — HEIGHT: 66 IN | WEIGHT: 188 LBS | BODY MASS INDEX: 30.22 KG/M2

## 2018-03-02 VITALS — SYSTOLIC BLOOD PRESSURE: 176 MMHG | DIASTOLIC BLOOD PRESSURE: 90 MMHG

## 2018-03-02 VITALS — SYSTOLIC BLOOD PRESSURE: 168 MMHG | DIASTOLIC BLOOD PRESSURE: 82 MMHG

## 2018-03-02 VITALS — DIASTOLIC BLOOD PRESSURE: 92 MMHG | SYSTOLIC BLOOD PRESSURE: 178 MMHG

## 2018-03-02 DIAGNOSIS — F41.9: ICD-10-CM

## 2018-03-02 DIAGNOSIS — F03.91: ICD-10-CM

## 2018-03-02 DIAGNOSIS — G20: ICD-10-CM

## 2018-03-02 DIAGNOSIS — R63.3: ICD-10-CM

## 2018-03-02 DIAGNOSIS — R40.0: ICD-10-CM

## 2018-03-02 DIAGNOSIS — I10: ICD-10-CM

## 2018-03-02 DIAGNOSIS — N39.0: Primary | ICD-10-CM

## 2018-03-02 DIAGNOSIS — B37.49: ICD-10-CM

## 2018-03-02 DIAGNOSIS — R64: ICD-10-CM

## 2018-03-02 DIAGNOSIS — M54.9: ICD-10-CM

## 2018-03-02 DIAGNOSIS — B96.20: ICD-10-CM

## 2018-03-02 DIAGNOSIS — K64.9: ICD-10-CM

## 2018-03-02 DIAGNOSIS — Z87.891: ICD-10-CM

## 2018-03-02 DIAGNOSIS — E11.40: ICD-10-CM

## 2018-03-02 DIAGNOSIS — Z79.4: ICD-10-CM

## 2018-03-02 DIAGNOSIS — Z86.73: ICD-10-CM

## 2018-03-02 DIAGNOSIS — E86.0: ICD-10-CM

## 2018-03-02 DIAGNOSIS — Z66: ICD-10-CM

## 2018-03-02 LAB
ALBUMIN SERPL-MCNC: 4.4 GM/DL (ref 3.2–4.5)
ALP SERPL-CCNC: 76 U/L (ref 40–136)
ALT SERPL-CCNC: 72 U/L (ref 0–55)
APTT PPP: (no result) S
BACTERIA #/AREA URNS HPF: (no result) /HPF
BASOPHILS # BLD AUTO: 0.1 10^3/UL (ref 0–0.1)
BASOPHILS NFR BLD AUTO: 1 % (ref 0–10)
BASOPHILS NFR BLD MANUAL: 1 %
BILIRUB SERPL-MCNC: 0.7 MG/DL (ref 0.1–1)
BILIRUB UR QL STRIP: (no result)
BUN/CREAT SERPL: 32
CALCIUM SERPL-MCNC: 10.9 MG/DL (ref 8.5–10.1)
CHLORIDE SERPL-SCNC: 103 MMOL/L (ref 98–107)
CO2 SERPL-SCNC: 26 MMOL/L (ref 21–32)
CREAT SERPL-MCNC: 0.94 MG/DL (ref 0.6–1.3)
EOSINOPHIL # BLD AUTO: 0.2 10^3/UL (ref 0–0.3)
EOSINOPHIL NFR BLD AUTO: 2 % (ref 0–10)
EOSINOPHIL NFR BLD MANUAL: 3 %
ERYTHROCYTE [DISTWIDTH] IN BLOOD BY AUTOMATED COUNT: 16.4 % (ref 10–14.5)
FIBRINOGEN PPP-MCNC: CLEAR MG/DL
GFR SERPLBLD BASED ON 1.73 SQ M-ARVRAT: 59 ML/MIN
GLUCOSE SERPL-MCNC: 199 MG/DL (ref 70–105)
GLUCOSE UR STRIP-MCNC: NEGATIVE MG/DL
HCT VFR BLD CALC: 42 % (ref 35–52)
HGB BLD-MCNC: 13.7 G/DL (ref 11.5–16)
KETONES UR QL STRIP: (no result)
LEUKOCYTE ESTERASE UR QL STRIP: (no result)
LYMPHOCYTES # BLD AUTO: 1.7 X 10^3 (ref 1–4)
LYMPHOCYTES NFR BLD AUTO: 10 % (ref 12–44)
MANUAL DIFFERENTIAL PERFORMED BLD QL: YES
MCH RBC QN AUTO: 27 PG (ref 25–34)
MCHC RBC AUTO-ENTMCNC: 33 G/DL (ref 32–36)
MCV RBC AUTO: 82 FL (ref 80–99)
MONOCYTES # BLD AUTO: 1.5 X 10^3 (ref 0–1)
MONOCYTES NFR BLD AUTO: 9 % (ref 0–12)
MONOCYTES NFR BLD: 6 %
NEUTROPHILS # BLD AUTO: 13 X 10^3 (ref 1.8–7.8)
NEUTROPHILS NFR BLD AUTO: 79 % (ref 42–75)
NEUTS BAND NFR BLD MANUAL: 82 %
NEUTS BAND NFR BLD: 0 %
NITRITE UR QL STRIP: POSITIVE
PH UR STRIP: 5 [PH] (ref 5–9)
PLATELET # BLD: 274 10^3/UL (ref 130–400)
PMV BLD AUTO: 11.3 FL (ref 7.4–10.4)
POTASSIUM SERPL-SCNC: 4.7 MMOL/L (ref 3.6–5)
PROT SERPL-MCNC: 7.6 GM/DL (ref 6.4–8.2)
PROT UR QL STRIP: (no result)
RBC # BLD AUTO: 5.09 10^6/UL (ref 4.35–5.85)
RBC #/AREA URNS HPF: (no result) /HPF
RBC MORPH BLD: NORMAL
SODIUM SERPL-SCNC: 142 MMOL/L (ref 135–145)
SP GR UR STRIP: 1.02 (ref 1.02–1.02)
SQUAMOUS #/AREA URNS HPF: (no result) /HPF
UROBILINOGEN UR-MCNC: 1 MG/DL
VARIANT LYMPHS NFR BLD MANUAL: 8 %
WBC # BLD AUTO: 16.5 10^3/UL (ref 4.3–11)
WBC #/AREA URNS HPF: (no result) /HPF

## 2018-03-02 PROCEDURE — 85025 COMPLETE CBC W/AUTO DIFF WBC: CPT

## 2018-03-02 PROCEDURE — 80048 BASIC METABOLIC PNL TOTAL CA: CPT

## 2018-03-02 PROCEDURE — 81000 URINALYSIS NONAUTO W/SCOPE: CPT

## 2018-03-02 PROCEDURE — 87088 URINE BACTERIA CULTURE: CPT

## 2018-03-02 PROCEDURE — 87040 BLOOD CULTURE FOR BACTERIA: CPT

## 2018-03-02 PROCEDURE — 87077 CULTURE AEROBIC IDENTIFY: CPT

## 2018-03-02 PROCEDURE — 85007 BL SMEAR W/DIFF WBC COUNT: CPT

## 2018-03-02 PROCEDURE — 80053 COMPREHEN METABOLIC PANEL: CPT

## 2018-03-02 PROCEDURE — 96365 THER/PROPH/DIAG IV INF INIT: CPT

## 2018-03-02 PROCEDURE — 71045 X-RAY EXAM CHEST 1 VIEW: CPT

## 2018-03-02 PROCEDURE — 96361 HYDRATE IV INFUSION ADD-ON: CPT

## 2018-03-02 PROCEDURE — 83605 ASSAY OF LACTIC ACID: CPT

## 2018-03-02 PROCEDURE — 36415 COLL VENOUS BLD VENIPUNCTURE: CPT

## 2018-03-02 PROCEDURE — 84484 ASSAY OF TROPONIN QUANT: CPT

## 2018-03-02 PROCEDURE — 99284 EMERGENCY DEPT VISIT MOD MDM: CPT

## 2018-03-02 PROCEDURE — 87186 SC STD MICRODIL/AGAR DIL: CPT

## 2018-03-02 PROCEDURE — 85027 COMPLETE CBC AUTOMATED: CPT

## 2018-03-02 RX ADMIN — FENTANYL CITRATE PRN MCG: 50 INJECTION, SOLUTION INTRAMUSCULAR; INTRAVENOUS at 23:41

## 2018-03-02 RX ADMIN — FENTANYL CITRATE PRN MCG: 50 INJECTION, SOLUTION INTRAMUSCULAR; INTRAVENOUS at 19:02

## 2018-03-02 RX ADMIN — FENTANYL CITRATE PRN MCG: 50 INJECTION, SOLUTION INTRAMUSCULAR; INTRAVENOUS at 21:29

## 2018-03-02 RX ADMIN — SODIUM CHLORIDE SCH MLS/HR: 900 INJECTION, SOLUTION INTRAVENOUS at 23:41

## 2018-03-02 RX ADMIN — SODIUM CHLORIDE SCH MLS/HR: 900 INJECTION, SOLUTION INTRAVENOUS at 16:23

## 2018-03-02 RX ADMIN — FENTANYL CITRATE PRN MCG: 50 INJECTION, SOLUTION INTRAMUSCULAR; INTRAVENOUS at 17:00

## 2018-03-02 NOTE — DIAGNOSTIC IMAGING REPORT
PATIENT HISTORY: 

Altered mental status. 



TECHNIQUE: 

Single frontal view of the chest.



COMPARISON: 

10/28/2017.



FINDINGS:

The lung volumes are normal. No focal consolidation is seen.

There is no large pleural effusion or pneumothorax. The cardiac

silhouette is stable in size. There is aortic atherosclerosis.

Old left-sided rib fractures are noted.



IMPRESSION: 

No acute pulmonary abnormality is seen.



Dictated by: 



  Dictated on workstation # [id number]

## 2018-03-02 NOTE — ED GENERAL
General


Chief Complaint:  Altered Mental Status


Stated Complaint:  AMS


Source of Information:  Patient, EMS, RN/MD (Dr. Dacosta.)





History of Present Illness


Date Seen by Provider:  Mar 2, 2018


Time Seen by Provider:  12:56


Initial Comments


This 68-year-old white female is brought to the emergency department by EMS for 

an altered level of consciousness.  Patient's in the care of Dr. Dacosta.  

Dr. Dacosta called me and said that the patient could be septic.











The patient was recently transferred to the nursing home from a mental Health 

Center.  The patient's past medical history includes strokes.  The patient in 

addition to having had strokes in the past suffers from dementia.





Allergies and Home Medications


Allergies


Coded Allergies:  


     No Known Drug Allergies (Unverified , 3/11/14)





Home Medications


Desvenlafaxine Succinate 50 Mg Tab.er.24h, 50 MG PO DAILY, (Reported)


Docusate Sodium 100 Mg Capsule, 100 MG PO DAILY, (Reported)


Duloxetine HCl 30 Mg Capsule.dr, 30 MG PO BID, (Reported)


Ferrous Sulfate 325 Mg Tablet, 325 MG PO DAILY, (Reported)


Furosemide 40 Mg Tablet, 40 MG PO DAILY, (Reported)


Insulin Aspart 300 Units/3 Ml Solution, SQ SLIDING/SCALE, (Reported)


    = 0 UNITS 201-250 = 3 UNITS 251-300 = 5 UNITS 301-350 = 7 UNITS 351-

400 = 9 UNITS CALL PHYSICIAN IF GREATER THAN 400 


Lactulose 10 Gm/15 Ml Solution, 30 ML PO DAILY, (Reported)


   LAST FILLED 08/09/17 #946ML 


Loratadine 10 Mg Tablet, 10 MG PO DAILY, (Reported)


Melatonin/Pyridoxine HCl (B6) 1 Each Tablet, 3 MG PO HS, (Reported)


Metformin HCl 1,000 Mg Tablet, 1,000 MG PO BID, (Reported)


Metoprolol Succinate 50 Mg Tab.er.24h, 50 MG PO DAILY, (Reported)


Mirtazapine 15 Mg Tablet, 15 MG PO DAILY, (Reported)


Olanzapine 5 Mg Tablet, 5 MG PO DAILY


   Prescribed by: PRIYA SANCHEZ on 11/3/17 0955


Pantoprazole Sodium 40 Mg Tablet.dr, 40 MG PO DAILY, (Reported)


Pregabalin 150 Mg Capsule, 150 MG PO TID, (Reported)


   LAST FILLED 06/06/17 #90 


Rivastigmine 4.6 Mg Patch, 4.6 MG TD DAILY, (Reported)


Ropinirole HCl 2 Mg Tablet, 2 MG PO BID, (Reported)


Simvastatin 10 Mg Tablet, 10 MG PO HS, (Reported)


Solifenacin Succinate 10 Mg Tablet, 10 MG PO DAILY, (Reported)





Patient Home Medication List


Home Medication List Reviewed:  Yes





Constitutional:  weakness


EENTM:  no symptoms reported


Respiratory:  no symptoms reported


Cardiovascular:  no symptoms reported


Gastrointestinal:  no symptoms reported


Genitourinary:  no symptoms reported


Musculoskeletal:  no symptoms reported


Skin:  no symptoms reported


Psychiatric/Neurological:  No Symptoms Reported


Hematologic/Lymphatic:  No Symptoms Reported


Immunological/Allergic:  no symptoms reported





Past Medical-Social-Family Hx


Patient Social History


Type Used:  Cigarettes


Former Smoker, Quit:  Jan 1, 1990


2nd Hand Smoke Exposure:  No


Recent Hopitalizations:  Yes





Immunizations Up To Date


Tetanus Booster (TDap):  Less than 5yrs


PED Vaccines UTD:  No


Date of Pneumonia Vaccine:  Sep 1, 2016


Date of Influenza Vaccine:  Oct 1, 2017





Seasonal Allergies


Seasonal Allergies:  No





Surgeries


History of Surgeries:  Yes


Surgeries:  Gallbladder, Joint Replacement, Orthopedic





Respiratory


History of Respiratory Disorde:  No


Currently Using CPAP:  No


Currently Using BIPAP:  No





Cardiovascular


History of Cardiac Disorders:  Yes


Cardiac Disorders:  Hypertension





Neurological


History of Neurological Disord:  Yes


Neurological Disorders:  Neuropathy





Reproductive System


Hx Reproductive Disorders:  No


Sexually Transmitted Disease:  No


HIV/AIDS:  No





Genitourinary


History of Genitourinary Disor:  No





Gastrointestinal


History of Gastrointestinal Di:  No


Gastrointestinal Disorders:  Hemorrhoids





Musculoskeletal


History of Musculoskeletal Dis:  Yes


Musculoskeletal Disorders:  Chronic Back Pain





Endocrine


History of Endocrine Disorders:  Yes


Endocrine Disorders:  Diabetes, Insulin dep





HEENT


History of HEENT Disorders:  Yes


HEENT Disorders:  Cataract


Loss of Vision:  Denies


Hearing Impairment:  Denies





Cancer


History of Cancer:  No


Did You Recieve Any Treatments:  No





Psychosocial


History of Psychiatric Problem:  No


Behavioral Health Disorders:  Anxiety, Depression





Integumentary


History of Skin or Integumenta:  Yes





Blood Transfusions


History of Blood Disorders:  No


Adverse Reaction to a Blood Tr:  No





Reviewed Nursing Assessment


Reviewed/Agree w Nursing PMH:  Yes





Family Medical History


Significant Family History:  No Pertinent Family Hx


Family Medial History:  


Congestive heart failure


  03 FATHER


Prostate cancer


  03 FATHER





No Family History of:


  Abdominal aortic aneurysm


  [place]'s disease


  Alcoholism


  Aphasia


  Cancer


  Cancer of colon


  Cataract


  Congenital heart disease


  Cystic fibrosis


  Dementia


  Dysphagia


  Family history: Allergy


  Family history: Alzheimer's disease


  Family history: Arthritis


  Family history: Asthma


  Family history: Breast disease


  Family history: Cardiovascular disease


  Family history: Coronary thrombosis


  Family history: Diabetes mellitus


  Family history: Gastrointestinal disease


  Family history: Glaucoma


  Family history: Hypertension


  Family history: Osteoporosis


  Family history: Thyroid disorder


  Headache


  Hearing loss


  Heart disease


  Hereditary disease


  History of - anemia


  History of - disorder


  History of - respiratory disease


  History of drug abuse


  Human immunodeficiency virus (HIV) seropositivity


  Hypercholesterolemia


  Infertile


  Kidney disease


  Malignant neoplasm of lung


  Myocardial infarction


  Parkinson's disease


  Psychotic disorder


  Seizure disorder


  Stroke


  Tuberculosis


  Visual impairment





Physical Exam


Vital Signs





Vital Signs - First Documented








 3/2/18





 13:11


 


Temp 97.2


 


Pulse 82


 


Resp 16


 


B/P (MAP) 156/80 (105)


 


Pulse Ox 94


 


O2 Delivery Room Air





Capillary Refill :


General Appearance:  Cachetic


Eyes:  Bilateral Eye Normal Inspection


HEENT:  Normal ENT Inspection


Neck:  Normal Inspection, Non Tender, Supple


Respiratory:  Lungs Clear


Cardiovascular:  Regular Rate, Rhythm


Gastrointestinal:  Normal Bowel Sounds, No Organomegaly


Back:  Normal Inspection


Extremity:  Normal Capillary Refill, Normal Inspection, Normal Range of Motion


Neurologic/Psychiatric:  Other (the patient is minimally verbal.  She responds 

to marked verbal stimuli does not provide appropriate responses to questions.)


Skin:  Pallor





Focused Exam


Evaluation


Lactate Level


Laboratory Tests


3/2/18 13:00: Lactic Acid Level 0.89





Lactic Acid Level





Laboratory Tests








Test


  3/2/18


13:00


 


Lactic Acid Level


  0.89 MMOL/L


(0.50-2.00)











Progress/Results/Core Measures


Suspected Sepsis


SIRS


Temperature: 


Pulse:  


Respiratory Rate: 


 


Laboratory Tests


3/2/18 13:00: White Blood Count 16.5H


Blood Pressure  / 


Mean: 


 





Laboratory Tests


3/2/18 13:00: Lactic Acid Level 0.89








Laboratory Tests


3/2/18 13:00: 


Creatinine 0.94, Platelet Count 274, Total Bilirubin 0.7





Results/Orders


Lab Results





Laboratory Tests








Test


  3/2/18


13:00 3/2/18


13:20 Range/Units


 


 


White Blood Count


  16.5 H


  


  4.3-11.0


10^3/uL


 


Red Blood Count


  5.09 


  


  4.35-5.85


10^6/uL


 


Hemoglobin 13.7   11.5-16.0  G/DL


 


Hematocrit 42   35-52  %


 


Mean Corpuscular Volume 82   80-99  FL


 


Mean Corpuscular Hemoglobin 27   25-34  PG


 


Mean Corpuscular Hemoglobin


Concent 33 


  


  32-36  G/DL


 


 


Red Cell Distribution Width 16.4 H  10.0-14.5  %


 


Platelet Count


  274 


  


  130-400


10^3/uL


 


Mean Platelet Volume 11.3 H  7.4-10.4  FL


 


Neutrophils (%) (Auto) 79 H  42-75  %


 


Lymphocytes (%) (Auto) 10 L  12-44  %


 


Monocytes (%) (Auto) 9   0-12  %


 


Eosinophils (%) (Auto) 2   0-10  %


 


Basophils (%) (Auto) 1   0-10  %


 


Neutrophils # (Auto) 13.0 H  1.8-7.8  X 10^3


 


Lymphocytes # (Auto) 1.7   1.0-4.0  X 10^3


 


Monocytes # (Auto) 1.5 H  0.0-1.0  X 10^3


 


Eosinophils # (Auto)


  0.2 


  


  0.0-0.3


10^3/uL


 


Basophils # (Auto)


  0.1 


  


  0.0-0.1


10^3/uL


 


Neutrophils % (Manual) 82    %


 


Lymphocytes % (Manual) 8    %


 


Monocytes % (Manual) 6    %


 


Eosinophils % (Manual) 3    %


 


Basophils % (Manual) 1    %


 


Band Neutrophils 0    %


 


Blood Morphology Comment NORMAL    


 


Sodium Level 142   135-145  MMOL/L


 


Potassium Level 4.7   3.6-5.0  MMOL/L


 


Chloride Level 103     MMOL/L


 


Carbon Dioxide Level 26   21-32  MMOL/L


 


Anion Gap 13   5-14  MMOL/L


 


Blood Urea Nitrogen 30 H  7-18  MG/DL


 


Creatinine


  0.94 


  


  0.60-1.30


MG/DL


 


Estimat Glomerular Filtration


Rate 59 


  


   


 


 


BUN/Creatinine Ratio 32    


 


Glucose Level 199 H    MG/DL


 


Lactic Acid Level


  0.89 


  


  0.50-2.00


MMOL/L


 


Calcium Level 10.9 H  8.5-10.1  MG/DL


 


Total Bilirubin 0.7   0.1-1.0  MG/DL


 


Aspartate Amino Transf


(AST/SGOT) 38 H


  


  5-34  U/L


 


 


Alanine Aminotransferase


(ALT/SGPT) 72 H


  


  0-55  U/L


 


 


Alkaline Phosphatase 76     U/L


 


Troponin I < 0.30   <0.30  NG/ML


 


Total Protein 7.6   6.4-8.2  GM/DL


 


Albumin 4.4   3.2-4.5  GM/DL


 


Urine Color  ORANGE   


 


Urine Clarity  CLEAR   


 


Urine pH  5  5-9  


 


Urine Specific Gravity  1.020  1.016-1.022  


 


Urine Protein  4+  NEGATIVE  


 


Urine Glucose (UA)  NEGATIVE  NEGATIVE  


 


Urine Ketones  2+ H NEGATIVE  


 


Urine Nitrite  POSITIVE H NEGATIVE  


 


Urine Bilirubin  2+ H NEGATIVE  


 


Urine Urobilinogen  1  NORMAL  MG/DL


 


Urine Leukocyte Esterase  1+ H NEGATIVE  


 


Urine RBC (Auto)  NEGATIVE  NEGATIVE  


 


Urine RBC  NONE   /HPF


 


Urine WBC  0-2   /HPF


 


Urine Squamous Epithelial


Cells 


  NONE 


   /HPF


 


 


Urine Crystals  NONE   /LPF


 


Urine Bacteria  FEW H  /HPF


 


Urine Casts  NONE   /LPF


 


Urine Mucus  NEGATIVE   /LPF


 


Urine Culture Indicated  YES   








My Orders





Orders - CHARMAINE DESIR MD


Cbc With Automated Diff (3/2/18 12:32)


Comprehensive Metabolic Panel (3/2/18 12:32)


Lactic Acid Analyzer (3/2/18 12:32)


Blood Culture (3/2/18 12:32)


Ua Culture If Indicated (3/2/18 12:32)


Chest 1 View, Ap/Pa Only (3/2/18 12:32)


Ekg Tracing (3/2/18 13:11)


Troponin I (3/2/18 13:11)


Ns Iv 1000 Ml (Sodium Chloride 0.9%) (3/2/18 13:15)


Blood Culture (3/2/18 13:12)


Manual Differential (3/2/18 13:00)


Ceftriaxone Injection (Rocephin Injectio (3/2/18 13:45)


Urine Culture (3/2/18 13:20)


Ceftriaxone Injection (Rocephin Injectio (3/2/18 14:13)





Vital Signs/I&O





Vital Sign - Last 12Hours








 3/2/18





 13:11


 


Temp 97.2


 


Pulse 82


 


Resp 16


 


B/P (MAP) 156/80 (105)


 


Pulse Ox 94


 


O2 Delivery Room Air





Capillary Refill :


Progress Note :  


   Time:  14:40


Progress Note


I discussed with the patient's sister who has DURABLE POWER OF  whether 

the patient should be a full code or DO NOT RESUSCITATE.  It was his sister's 

decision making patient DO NOT RESUSCITATE.





Patient laboratory evaluation M shrimp.  Urinary tract infection.  Chest x-ray 

films fail to  reveal evidence of an infiltrate.  Patient was moderately 

dehydrated.  Patient was given IV fluids and 2 g Rocephin IV.





Telephone consultation was undertaken with Dr. Griffiths who is kind enough to 

admit patient for Dr. Dacosta.





Departure


Communication (Admissions)


Time/Spoke to Admitting Phy:  14:42


Communication


Dr. Griffiths.





Impression


Impression:  


 Primary Impression:  


 Urinary tract infection


 Qualified Codes:  N30.00 - Acute cystitis without hematuria


 Additional Impressions:  


 Dehydration


 Decreased level of consciousness


Disposition:  09 ADMITTED AS INPATIENT


Condition:  Improved





Admissions


Decision to Admit Reason:  Admit from ER (General)


Decision to Admit/Date:  Mar 2, 2018


Time/Decision to Admit Time:  14:43





Departure-Patient Inst.


Referrals:  


LELAND DACOSTA DO (PCP/Family)


Primary Care Physician











CHARMAINE DESIR MD Mar 2, 2018 13:06

## 2018-03-03 VITALS — DIASTOLIC BLOOD PRESSURE: 78 MMHG | SYSTOLIC BLOOD PRESSURE: 138 MMHG

## 2018-03-03 VITALS — SYSTOLIC BLOOD PRESSURE: 118 MMHG | DIASTOLIC BLOOD PRESSURE: 64 MMHG

## 2018-03-03 VITALS — SYSTOLIC BLOOD PRESSURE: 163 MMHG | DIASTOLIC BLOOD PRESSURE: 79 MMHG

## 2018-03-03 VITALS — SYSTOLIC BLOOD PRESSURE: 158 MMHG | DIASTOLIC BLOOD PRESSURE: 81 MMHG

## 2018-03-03 VITALS — DIASTOLIC BLOOD PRESSURE: 61 MMHG | SYSTOLIC BLOOD PRESSURE: 133 MMHG

## 2018-03-03 LAB
ALBUMIN SERPL-MCNC: 3.9 GM/DL (ref 3.2–4.5)
ALP SERPL-CCNC: 74 U/L (ref 40–136)
ALT SERPL-CCNC: 79 U/L (ref 0–55)
BASOPHILS # BLD AUTO: 0.1 10^3/UL (ref 0–0.1)
BASOPHILS NFR BLD AUTO: 1 % (ref 0–10)
BILIRUB SERPL-MCNC: 0.6 MG/DL (ref 0.1–1)
BUN/CREAT SERPL: 29
CALCIUM SERPL-MCNC: 10.1 MG/DL (ref 8.5–10.1)
CHLORIDE SERPL-SCNC: 108 MMOL/L (ref 98–107)
CO2 SERPL-SCNC: 24 MMOL/L (ref 21–32)
CREAT SERPL-MCNC: 0.78 MG/DL (ref 0.6–1.3)
EOSINOPHIL # BLD AUTO: 0.4 10^3/UL (ref 0–0.3)
EOSINOPHIL NFR BLD AUTO: 3 % (ref 0–10)
ERYTHROCYTE [DISTWIDTH] IN BLOOD BY AUTOMATED COUNT: 16.4 % (ref 10–14.5)
GFR SERPLBLD BASED ON 1.73 SQ M-ARVRAT: > 60 ML/MIN
GLUCOSE SERPL-MCNC: 176 MG/DL (ref 70–105)
HCT VFR BLD CALC: 38 % (ref 35–52)
HGB BLD-MCNC: 12.4 G/DL (ref 11.5–16)
LYMPHOCYTES # BLD AUTO: 1.7 X 10^3 (ref 1–4)
LYMPHOCYTES NFR BLD AUTO: 11 % (ref 12–44)
MANUAL DIFFERENTIAL PERFORMED BLD QL: NO
MCH RBC QN AUTO: 27 PG (ref 25–34)
MCHC RBC AUTO-ENTMCNC: 33 G/DL (ref 32–36)
MCV RBC AUTO: 83 FL (ref 80–99)
MONOCYTES # BLD AUTO: 1.4 X 10^3 (ref 0–1)
MONOCYTES NFR BLD AUTO: 10 % (ref 0–12)
NEUTROPHILS # BLD AUTO: 11.3 X 10^3 (ref 1.8–7.8)
NEUTROPHILS NFR BLD AUTO: 75 % (ref 42–75)
PLATELET # BLD: 225 10^3/UL (ref 130–400)
PMV BLD AUTO: 11.2 FL (ref 7.4–10.4)
POTASSIUM SERPL-SCNC: 4 MMOL/L (ref 3.6–5)
PROT SERPL-MCNC: 7.1 GM/DL (ref 6.4–8.2)
RBC # BLD AUTO: 4.58 10^6/UL (ref 4.35–5.85)
SODIUM SERPL-SCNC: 141 MMOL/L (ref 135–145)
WBC # BLD AUTO: 15 10^3/UL (ref 4.3–11)

## 2018-03-03 RX ADMIN — FENTANYL CITRATE PRN MCG: 50 INJECTION, SOLUTION INTRAMUSCULAR; INTRAVENOUS at 22:11

## 2018-03-03 RX ADMIN — FENTANYL CITRATE PRN MCG: 50 INJECTION, SOLUTION INTRAMUSCULAR; INTRAVENOUS at 20:09

## 2018-03-03 RX ADMIN — LORAZEPAM PRN MG: 2 INJECTION INTRAMUSCULAR; INTRAVENOUS at 16:28

## 2018-03-03 RX ADMIN — FENTANYL CITRATE PRN MCG: 50 INJECTION, SOLUTION INTRAMUSCULAR; INTRAVENOUS at 14:50

## 2018-03-03 RX ADMIN — FENTANYL CITRATE PRN MCG: 50 INJECTION, SOLUTION INTRAMUSCULAR; INTRAVENOUS at 07:49

## 2018-03-03 RX ADMIN — SODIUM CHLORIDE SCH MLS/HR: 900 INJECTION INTRAVENOUS at 07:50

## 2018-03-03 RX ADMIN — FENTANYL CITRATE PRN MCG: 50 INJECTION, SOLUTION INTRAMUSCULAR; INTRAVENOUS at 11:11

## 2018-03-03 RX ADMIN — LORAZEPAM PRN MG: 2 INJECTION INTRAMUSCULAR; INTRAVENOUS at 20:35

## 2018-03-03 RX ADMIN — SODIUM CHLORIDE SCH MLS/HR: 900 INJECTION, SOLUTION INTRAVENOUS at 16:27

## 2018-03-03 RX ADMIN — FENTANYL CITRATE PRN MCG: 50 INJECTION, SOLUTION INTRAMUSCULAR; INTRAVENOUS at 23:58

## 2018-03-03 RX ADMIN — SODIUM CHLORIDE SCH MLS/HR: 900 INJECTION, SOLUTION INTRAVENOUS at 07:49

## 2018-03-03 RX ADMIN — FENTANYL CITRATE PRN MCG: 50 INJECTION, SOLUTION INTRAMUSCULAR; INTRAVENOUS at 05:10

## 2018-03-03 RX ADMIN — FENTANYL CITRATE PRN MCG: 50 INJECTION, SOLUTION INTRAMUSCULAR; INTRAVENOUS at 02:30

## 2018-03-03 NOTE — HISTORY & PHYSICAL-HOSPITALIST
HPI


History of Present Illness:


HPI/Chief Complaint


The patient is a 68-year-old white female patient of Dr. Sullivan's known to 

me from previous admissions.  She has been known to have mental health issues.  

She was in via [facility] early this year.  Her daughter states she went 

home for 4 days and then was placed at [facility].  She was 

therefore weeks.  When she arrived she was able to walk with a walker 

independently but shortly thereafter became unable to walk and was confined to 

a wheelchair.  She then went long periods without speaking.  She was unable to 

feed herself and ultimately placed on a pured diet.  She was discharged from 

there to a local nursing home and was there for 4 days when she was sent to our 

emergency room yesterday.  Dr. Sullivan had seen her at the nursing home 

yesterday morning.  The daughter tells me that he told them he thought she was 

dying.  In addition the daughter states that she has developed a tremor and 

that she wonders whether this is Parkinson's disease.


Exam Limitations:  no limitations


Date Seen


3/3/18


Time Seen by Provider:  12:51


Attending Physician


Carlton Fong MD


PCP


Edvin Sullivan DO


Referring Physician





Date of Admission


Mar 2, 2018 at 14:23





Home Medications & Allergies


Home Medications


Reviewed patient Home Medication Reconciliation Form





Allergies





Allergies


Coded Allergies


  codeine (Verified Allergy, Unknown, 3/2/18)








Past Medical-Social-Family Hx


Patient Social History


Alcohol Use:  Denies Use


Recreational Drug Use:  No


Smoking Status:  Former Smoker


Former Smoker, Quit:  1990


Type Used:  Cigarettes


2nd Hand Smoke Exposure:  No


Physical Abuse Screen:  No


Sexual Abuse:  No


Recent Foreign Travel:  No


Contact w/other who traveled:  No


Recent Hopitalizations:  Yes


Recent Infectious Disease Expo:  No





Immunizations Up To Date


Tetanus Booster (TDap):  Less than 5yrs


Pediatric:  No


Date of Pneumonia Vaccine:  Sep 1, 2016


Date of Influenza Vaccine:  Oct 1, 2017





Seasonal Allergies


Seasonal Allergies:  No





Surgeries


Yes


Gallbladder, Joint Replacement, Orthopedic





Respiratory


No


Currently Using CPAP:  No


Currently Using BIPAP:  No





Cardiovascular


Yes


Hypertension





Neurological


Yes


Neuropathy





Reproductive System


Pregnant:  No


Hx Reproductive Disorders:  No


Sexually Transmitted Disease:  No


HIV/AIDS:  No





Genitourinary


No





Gastrointestinal


Yes


Hemorrhoids





Musculoskeletal


Yes


Chronic Back Pain





Endocrine


History of Endocrine Disorders:  Yes


Endocrine Disorders:  Diabetes, Insulin dep





HEENT


History of HEENT Disorders:  Yes


HEENT Disorders:  Cataract


Loss of Vision:  Denies


Hearing Impairment:  Denies





Cancer


No


Did You Recieve Any Treatments:  No





Psychosocial


History of Psychiatric Problem:  No (DEMENTIA)


Behavioral Health Disorders:  Anxiety, Depression





Integumentary


History of Skin or Integumenta:  No





Blood Transfusions


History of Blood Disorders:  No


Adverse Reaction to a Blood Tr:  No





Reviewed Nursing Assessment


Reviewed/Agree w Nursing PMH:  Yes





Family Medical History


Significant Family History:  No Pertinent Family Hx


Family Hx:  


Congestive heart failure


  03 FATHER


Prostate cancer


  03 FATHER





No Family History of:


  Abdominal aortic aneurysm


  [place]'s disease


  Alcoholism


  Aphasia


  Cancer


  Cancer of colon


  Cataract


  Congenital heart disease


  Cystic fibrosis


  Dementia


  Dysphagia


  Family history: Allergy


  Family history: Alzheimer's disease


  Family history: Arthritis


  Family history: Asthma


  Family history: Breast disease


  Family history: Cardiovascular disease


  Family history: Coronary thrombosis


  Family history: Diabetes mellitus


  Family history: Gastrointestinal disease


  Family history: Glaucoma


  Family history: Hypertension


  Family history: Osteoporosis


  Family history: Thyroid disorder


  Headache


  Hearing loss


  Heart disease


  Hereditary disease


  History of - anemia


  History of - disorder


  History of - respiratory disease


  History of drug abuse


  Human immunodeficiency virus (HIV) seropositivity


  Hypercholesterolemia


  Infertile


  Kidney disease


  Malignant neoplasm of lung


  Myocardial infarction


  Parkinson's disease


  Psychotic disorder


  Seizure disorder


  Stroke


  Tuberculosis


  Visual impairment





Review of Systems


Constitutional:  see HPI, other (the patient did not speak or attempt to speak 

during my questioning.)





Physical Exam


Physical Exam


Vital Signs





Vital Signs - First Documented








 3/2/18





 13:11


 


Temp 97.2


 


Pulse 82


 


Resp 16


 


B/P (MAP) 156/80 (105)


 


Pulse Ox 94


 


O2 Delivery Room Air





Capillary Refill : Less Than 3 SecondsLess Than 3 Seconds


General Appearance:  Other (semi-alert in appearance.  A flapping tremor was 

noted at intervals affecting the right hand)


Eyes:  Bilateral Eye Normal Inspection


HEENT:  Normal ENT Inspection, Other (tongue was very dry and rough)


Neck:  Other (she is stiff to passive range of motion and appears to find it 

uncomfortable)


Respiratory:  Chest Non Tender, Lungs Clear, Normal Breath Sounds, No Accessory 

Muscle Use, No Respiratory Distress


Cardiovascular:  Regular Rate, Rhythm, No Edema, No Gallop, No JVD, No Murmur, 

Normal Peripheral Pulses


Gastrointestinal:  Normal Bowel Sounds, No Organomegaly, No Pulsatile Mass, Non 

Tender, Soft


Comments


She was very stiff and with mild cogwheeling when I did passive extension and 

flexion of her arms at the elbow.





Results


Results/Procedures


Lab


Laboratory Tests


3/2/18 13:00








3/3/18 04:35














Assessment/Plan


Admission Diagnosis


Urinary tract infection with Escherichia coli.  2.history of delusions and 

aggressive behavior.  3.rather marked short-term decline in physical function 

with cogwheeling suggesting the possibility of Parkinson's disease


Admission Status:  Inpatient Order (span 2 midnights)


Reason for Inpatient Admission:  


Apparent rapid and remarkable declining physical function.  2.treatment of


urinary tract infection.





Assessment and Plan


Treat urinary tract infection and acquire a speech consultation for swallowing 

on Monday.





Clinical Quality Measures


DVT/VTE Risk/Contraindication:


Risk Factor Score Per Nursin


RFS Level Per Nursing on Admit:  4+=Very High











CARLTON FONG MD Mar 3, 2018 12:54

## 2018-03-03 NOTE — DIAGNOSTIC IMAGING REPORT
CHEST 1 VIEW, AP/PA ONLY



Indication: Sepsis.



Comparison: 3/2/2018



Findings:

No focal airspace disease in the visualized lungs. Please note

that the posterior lower lobes are poorly evaluated by portable

radiography. No pleural effusion or pneumothorax. Normal

cardiomediastinal silhouette.



Impression: 

No acute cardiopulmonary process by portable radiography.



Dictated by: 



  Dictated on workstation # [id number]

## 2018-03-04 RX ADMIN — FENTANYL CITRATE PRN MCG: 50 INJECTION, SOLUTION INTRAMUSCULAR; INTRAVENOUS at 16:04

## 2018-03-04 RX ADMIN — FENTANYL CITRATE PRN MCG: 50 INJECTION, SOLUTION INTRAMUSCULAR; INTRAVENOUS at 18:17

## 2018-03-04 RX ADMIN — FENTANYL CITRATE PRN MCG: 50 INJECTION, SOLUTION INTRAMUSCULAR; INTRAVENOUS at 05:13

## 2018-03-04 RX ADMIN — LORAZEPAM PRN MG: 2 INJECTION INTRAMUSCULAR; INTRAVENOUS at 05:13

## 2018-03-04 RX ADMIN — LORAZEPAM PRN MG: 2 INJECTION INTRAMUSCULAR; INTRAVENOUS at 22:30

## 2018-03-04 RX ADMIN — FENTANYL CITRATE PRN MCG: 50 INJECTION, SOLUTION INTRAMUSCULAR; INTRAVENOUS at 11:35

## 2018-03-04 RX ADMIN — FENTANYL CITRATE PRN MCG: 50 INJECTION, SOLUTION INTRAMUSCULAR; INTRAVENOUS at 08:21

## 2018-03-04 RX ADMIN — SODIUM CHLORIDE SCH MLS/HR: 900 INJECTION INTRAVENOUS at 08:09

## 2018-03-04 RX ADMIN — LORAZEPAM PRN MG: 2 INJECTION INTRAMUSCULAR; INTRAVENOUS at 13:39

## 2018-03-04 RX ADMIN — FENTANYL CITRATE PRN MCG: 50 INJECTION, SOLUTION INTRAMUSCULAR; INTRAVENOUS at 22:30

## 2018-03-04 RX ADMIN — SODIUM CHLORIDE SCH MLS/HR: 900 INJECTION, SOLUTION INTRAVENOUS at 08:09

## 2018-03-04 RX ADMIN — LORAZEPAM PRN MG: 2 INJECTION INTRAMUSCULAR; INTRAVENOUS at 18:17

## 2018-03-04 RX ADMIN — SODIUM CHLORIDE SCH MLS/HR: 900 INJECTION, SOLUTION INTRAVENOUS at 16:04

## 2018-03-04 RX ADMIN — SODIUM CHLORIDE SCH MLS/HR: 900 INJECTION, SOLUTION INTRAVENOUS at 00:02

## 2018-03-04 RX ADMIN — LORAZEPAM PRN MG: 2 INJECTION INTRAMUSCULAR; INTRAVENOUS at 09:57

## 2018-03-04 NOTE — PROGRESS NOTE-HOSPITALIST
Standard Progress Note


Progress Notes/Assess & Plan


Date Seen


3/4/18


Time Seen by Provider:  13:25


Diagnosis


Urinary tract infection with Escherichia coli.  2.history of delusions and 

aggressive behavior.  3.rather marked short-term decline in physical function 

with cogwheeling suggesting the possibility of Parkinson's disease


Assess & Plan/Chief Complaint


The patient was sleeping soundly and slept through my visit.  Her granddaughter 

was present stated that after the Ativan that she was given she was able to 

sleep through the night for the first time in a while.  The patient's color is 

better her respirations are smooth and without distress.  The Escherichia coli 

in the urine was sensitive to the Rocephin that week asked.  The patient has 

been afebrile.





Physical exam: She is sleeping and appears quite peaceful.  Lungs are clear to 

auscultation.  CV is regular without murmur.  Abdomen is soft.





Impression: Urinary tract infection with Escherichia coli.  2.recent history of 

delusions and aggressive behavior.  3.marked decline in short term as to 

physical performance.





Plan: The granddaughter brought up the question of the hospice/comfort care.  

This is quite reasonable and should be explored when our staff returns tomorrow.


Labs


Laboratory Tests


3/3/18 04:35




















ZACHARY FONG MD Mar 4, 2018 13:28

## 2018-03-05 LAB
APTT PPP: YELLOW S
BACTERIA #/AREA URNS HPF: (no result) /HPF
BILIRUB UR QL STRIP: NEGATIVE
BUN/CREAT SERPL: 16
CALCIUM SERPL-MCNC: 9.5 MG/DL (ref 8.5–10.1)
CHLORIDE SERPL-SCNC: 109 MMOL/L (ref 98–107)
CO2 SERPL-SCNC: 21 MMOL/L (ref 21–32)
CREAT SERPL-MCNC: 0.68 MG/DL (ref 0.6–1.3)
ERYTHROCYTE [DISTWIDTH] IN BLOOD BY AUTOMATED COUNT: 16.3 % (ref 10–14.5)
FIBRINOGEN PPP-MCNC: CLEAR MG/DL
GFR SERPLBLD BASED ON 1.73 SQ M-ARVRAT: > 60 ML/MIN
GLUCOSE SERPL-MCNC: 131 MG/DL (ref 70–105)
GLUCOSE UR STRIP-MCNC: (no result) MG/DL
HCT VFR BLD CALC: 35 % (ref 35–52)
HGB BLD-MCNC: 11.3 G/DL (ref 11.5–16)
KETONES UR QL STRIP: (no result)
LEUKOCYTE ESTERASE UR QL STRIP: (no result)
MCH RBC QN AUTO: 27 PG (ref 25–34)
MCHC RBC AUTO-ENTMCNC: 33 G/DL (ref 32–36)
MCV RBC AUTO: 83 FL (ref 80–99)
NITRITE UR QL STRIP: NEGATIVE
PH UR STRIP: 6 [PH] (ref 5–9)
PLATELET # BLD: 191 10^3/UL (ref 130–400)
PMV BLD AUTO: 10.9 FL (ref 7.4–10.4)
POTASSIUM SERPL-SCNC: 3.3 MMOL/L (ref 3.6–5)
PROT UR QL STRIP: (no result)
RBC # BLD AUTO: 4.18 10^6/UL (ref 4.35–5.85)
RBC #/AREA URNS HPF: (no result) /HPF
SODIUM SERPL-SCNC: 140 MMOL/L (ref 135–145)
SP GR UR STRIP: 1.01 (ref 1.02–1.02)
SQUAMOUS #/AREA URNS HPF: (no result) /HPF
UROBILINOGEN UR-MCNC: NORMAL MG/DL
WBC # BLD AUTO: 13 10^3/UL (ref 4.3–11)
WBC #/AREA URNS HPF: (no result) /HPF
YEAST #/AREA URNS HPF: (no result) /HPF

## 2018-03-05 RX ADMIN — SODIUM CHLORIDE SCH MLS/HR: 900 INJECTION, SOLUTION INTRAVENOUS at 20:43

## 2018-03-05 RX ADMIN — SODIUM CHLORIDE SCH MLS/HR: 900 INJECTION INTRAVENOUS at 08:14

## 2018-03-05 RX ADMIN — FENTANYL CITRATE PRN MCG: 50 INJECTION, SOLUTION INTRAMUSCULAR; INTRAVENOUS at 12:47

## 2018-03-05 RX ADMIN — LORAZEPAM PRN MG: 2 INJECTION INTRAMUSCULAR; INTRAVENOUS at 04:10

## 2018-03-05 RX ADMIN — SODIUM CHLORIDE SCH MLS/HR: 900 INJECTION, SOLUTION INTRAVENOUS at 00:16

## 2018-03-05 RX ADMIN — FENTANYL CITRATE PRN MCG: 50 INJECTION, SOLUTION INTRAMUSCULAR; INTRAVENOUS at 08:22

## 2018-03-05 RX ADMIN — FENTANYL CITRATE PRN MCG: 50 INJECTION, SOLUTION INTRAMUSCULAR; INTRAVENOUS at 15:42

## 2018-03-05 RX ADMIN — FENTANYL CITRATE PRN MCG: 50 INJECTION, SOLUTION INTRAMUSCULAR; INTRAVENOUS at 04:09

## 2018-03-05 RX ADMIN — LORAZEPAM PRN MG: 2 INJECTION INTRAMUSCULAR; INTRAVENOUS at 12:47

## 2018-03-05 RX ADMIN — FENTANYL CITRATE PRN MCG: 50 INJECTION, SOLUTION INTRAMUSCULAR; INTRAVENOUS at 17:46

## 2018-03-05 RX ADMIN — LORAZEPAM PRN MG: 2 INJECTION INTRAMUSCULAR; INTRAVENOUS at 16:49

## 2018-03-05 RX ADMIN — LORAZEPAM PRN MG: 2 INJECTION INTRAMUSCULAR; INTRAVENOUS at 08:22

## 2018-03-05 RX ADMIN — SODIUM CHLORIDE SCH MLS/HR: 900 INJECTION, SOLUTION INTRAVENOUS at 08:14

## 2018-03-05 RX ADMIN — LORAZEPAM PRN MG: 2 INJECTION INTRAMUSCULAR; INTRAVENOUS at 20:43

## 2018-03-05 NOTE — PHYSICIAN QUERY CLARIFICATION
PQ-Further Specificity


Admission/Discharge


Admission Date: Mar 2, 2018 at 14:23 


Discharge Date:





The medical record reflects the following clinical scenario:





History/Risk Factors:


UTI-E coli


Altered mental status





Clinical Findings:T 97.2, pulse 82, resp 16, /80, Lactic acid 0.89, Blood 

culture-No growth, Urine culture <10,000 Escherichia coli.


WBC 16.5 Bands 0





Treatment: 2 g Rochephin IV.





H&P by Dr. Griffiths indicated UTI, your progress note listed urosepsis. ICD 10 

does not have a code for urosepsis. 


Question:  Can you further specify Urosepsis per the clinical indicators above? 

Please document below.





1. UTI only.





2. UTI with sepsis. (If sepsis, please document qualifying criteria).





3. Other, with explanation of the clinical findings.





4. Clinically undetermined, no explanation for the clinical findings.





PHYSICIAN RESPONSE


Can you specify per above:  1








In responding to this query, please exercise your independent professional 

judgment.  The purpose of this communication is to more accurately reflect the 

complexity of your patients condition. The fact that a question is asked does 

not imply that any particular answer is desired or expected.  





Thank you for your timely response to this clarification.      


   


Requestors name: Charley Mcfadden [facility]   





Phone # ext [phone] or [phone]








THIS PHYSICIAN QUERY FORM IS A PERMANENT PART OF THE MEDICAL RECORD











CHARLEY MCFADDEN Mar 5, 2018 09:01


LELAND DACOSTA DO Mar 5, 2018 12:33

## 2018-03-05 NOTE — PROGRESS NOTE (SOAP)
Subjective


Time Seen by Provider:  07:45


Subjective/Events-last exam


Patient is not talking today.


Patient has dementia and mental illness.


Patient has cogwheel motion of her arms.


Patient appears to have Parkinson disease.


Patient keeps her eyes closed.


Patient to have palliative care consult today.


Patient to have speech therapy for taking fluids.


Patient just keeps her eyes closed.


Spoke to sister about what's going on.


Patient and known diabetic





Objective


Exam





Vital Signs








  Date Time  Temp Pulse Resp B/P (MAP) Pulse Ox O2 Delivery O2 Flow Rate FiO2


 


3/4/18 13:00  78 22  91 Room Air  














I & O 


 


 3/5/18





 07:00


 


Intake Total 3100 ml


 


Output Total 2100 ml


 


Balance 1000 ml





Capillary Refill : Less Than 3 SecondsLess Than 3 Seconds


General Appearance:  No Apparent Distress, WD/WN


HEENT:  Normal ENT Inspection


Respiratory:  Normal Breath Sounds, No Accessory Muscle Use, No Respiratory 

Distress


Cardiovascular:  Regular Rate, Rhythm





Results


Lab





Microbiology


3/2/18 Blood Culture - Preliminary, Resulted


         No growth


3/2/18 Urine Culture - Final, Complete


         Escherichia coli





Assessment/Plan


Assessment/Plan


Assess & Plan/Chief Complaint


Urosepsis.


UTI.


Sirs.


Nonresponsive.


Parkinson disease.


Dementia.


Mental illness.


Diabetes.


To be evaluated by palliative care and speech therapy.


Inability to eat.


Prognosis poor 


Patient not eating or taken any fluids





Clinical Quality Measures


DVT/VTE Risk/Contraindication:


Risk Factor Score Per Nursin


RFS Level Per Nursing on Admit:  4+=Very High











LELAND DACOSTA DO Mar 5, 2018 07:47

## 2018-03-05 NOTE — SPEECH THERAPY PROGRESS NOTE
Therapy Progress Note


Speech pathology consult received and chart reviewed. The clinician attempted 

to wake the patient to an appropriate alertness level for PO intake. Regardless 

of maximum prompting, the patient did not open eyes or participate in the 

examination. A spoon was placed to the patient's lips. The patient made no 

attempts to open mouth for spoon. At this time, the clinician is unable to 

complete the examination and cannot comment on the appropriateness of the 

patient for PO. Speech pathology will reattempt at a later time.











HUGH VALDES Mar 5, 2018 09:42

## 2018-03-06 VITALS — DIASTOLIC BLOOD PRESSURE: 73 MMHG | SYSTOLIC BLOOD PRESSURE: 168 MMHG

## 2018-03-06 LAB
BUN/CREAT SERPL: 14
CALCIUM SERPL-MCNC: 9.1 MG/DL (ref 8.5–10.1)
CHLORIDE SERPL-SCNC: 108 MMOL/L (ref 98–107)
CO2 SERPL-SCNC: 18 MMOL/L (ref 21–32)
CREAT SERPL-MCNC: 0.7 MG/DL (ref 0.6–1.3)
ERYTHROCYTE [DISTWIDTH] IN BLOOD BY AUTOMATED COUNT: 16 % (ref 10–14.5)
GFR SERPLBLD BASED ON 1.73 SQ M-ARVRAT: > 60 ML/MIN
GLUCOSE SERPL-MCNC: 142 MG/DL (ref 70–105)
HCT VFR BLD CALC: 33 % (ref 35–52)
HGB BLD-MCNC: 11 G/DL (ref 11.5–16)
MCH RBC QN AUTO: 28 PG (ref 25–34)
MCHC RBC AUTO-ENTMCNC: 33 G/DL (ref 32–36)
MCV RBC AUTO: 82 FL (ref 80–99)
PLATELET # BLD: 193 10^3/UL (ref 130–400)
PMV BLD AUTO: 11.2 FL (ref 7.4–10.4)
POTASSIUM SERPL-SCNC: 2.9 MMOL/L (ref 3.6–5)
RBC # BLD AUTO: 4 10^6/UL (ref 4.35–5.85)
SODIUM SERPL-SCNC: 141 MMOL/L (ref 135–145)
WBC # BLD AUTO: 11.9 10^3/UL (ref 4.3–11)

## 2018-03-06 RX ADMIN — LORAZEPAM PRN MG: 2 INJECTION INTRAMUSCULAR; INTRAVENOUS at 13:38

## 2018-03-06 RX ADMIN — POTASSIUM CHLORIDE SCH MLS/HR: 200 INJECTION, SOLUTION INTRAVENOUS at 11:56

## 2018-03-06 RX ADMIN — POTASSIUM CHLORIDE SCH MLS/HR: 200 INJECTION, SOLUTION INTRAVENOUS at 12:00

## 2018-03-06 RX ADMIN — SODIUM CHLORIDE SCH MLS/HR: 900 INJECTION INTRAVENOUS at 08:27

## 2018-03-06 RX ADMIN — LORAZEPAM PRN MG: 2 INJECTION INTRAMUSCULAR; INTRAVENOUS at 18:08

## 2018-03-06 RX ADMIN — SODIUM CHLORIDE SCH MLS/HR: 900 INJECTION, SOLUTION INTRAVENOUS at 11:31

## 2018-03-06 RX ADMIN — POTASSIUM CHLORIDE SCH MLS/HR: 200 INJECTION, SOLUTION INTRAVENOUS at 10:30

## 2018-03-06 RX ADMIN — LORAZEPAM PRN MG: 2 INJECTION INTRAMUSCULAR; INTRAVENOUS at 09:24

## 2018-03-06 RX ADMIN — POTASSIUM CHLORIDE SCH MLS/HR: 200 INJECTION, SOLUTION INTRAVENOUS at 09:24

## 2018-03-06 RX ADMIN — LORAZEPAM PRN MG: 2 INJECTION INTRAMUSCULAR; INTRAVENOUS at 04:58

## 2018-03-06 RX ADMIN — LORAZEPAM PRN MG: 2 INJECTION INTRAMUSCULAR; INTRAVENOUS at 01:00

## 2018-03-06 RX ADMIN — LORAZEPAM PRN MG: 2 INJECTION INTRAMUSCULAR; INTRAVENOUS at 22:31

## 2018-03-06 NOTE — PROGRESS NOTE (SOAP)
Subjective


Time Seen by Provider:  07:50


Subjective/Events-last exam


Spoke to guardian which is her sister..


To go hospice tomorrow and be discharged back to nursing home.


Wants one more day of fluids since family member is coming in this Friday 

patient nonresponsive.


Sr. was in denial





Objective


Exam





Vital Signs








  Date Time  Temp Pulse Resp B/P (MAP) Pulse Ox O2 Delivery O2 Flow Rate FiO2


 


3/5/18 19:45      Room Air  


 


3/5/18 09:39 98.2 104 32  94 Room Air  


 


3/5/18 08:00      Room Air  














I & O 


 


 3/6/18





 07:00


 


Intake Total 1100 ml


 


Output Total 1725 ml


 


Balance -625 ml





Capillary Refill : Less Than 3 SecondsLess Than 3 Seconds


General Appearance:  No Apparent Distress, Other (Nonresponsive)





Results


Lab


Laboratory Tests


3/5/18 08:12: 


White Blood Count 13.0H, Red Blood Count 4.18L, Hemoglobin 11.3L, Hematocrit 35

, Mean Corpuscular Volume 83, Mean Corpuscular Hemoglobin 27, Mean Corpuscular 

Hemoglobin Concent 33, Red Cell Distribution Width 16.3H, Platelet Count 191, 

Mean Platelet Volume 10.9H, Sodium Level 140, Potassium Level 3.3L, Chloride 

Level 109H, Carbon Dioxide Level 21, Anion Gap 10, Blood Urea Nitrogen 11, 

Creatinine 0.68, Estimat Glomerular Filtration Rate > 60, BUN/Creatinine Ratio 

16, Glucose Level 131H, Calcium Level 9.5


3/5/18 11:25: 


Urine Color YELLOW, Urine Clarity CLEAR, Urine pH 6, Urine Specific Gravity 

1.015L, Urine Protein 3+H, Urine Glucose (UA) 1+H, Urine Ketones 3+H, Urine 

Nitrite NEGATIVE, Urine Bilirubin NEGATIVE, Urine Urobilinogen NORMAL, Urine 

Leukocyte Esterase 1+H, Urine RBC (Auto) 2+H, Urine RBC RARE, Urine WBC 25-50H, 

Urine Squamous Epithelial Cells RARE, Urine Crystals NONE, Urine Bacteria FEWH, 

Urine Casts NONE, Urine Mucus SMALLH, Urine Yeast MODERATEH, Urine Culture 

Indicated YES


3/6/18 05:50: 


White Blood Count 11.9H, Red Blood Count 4.00L, Hemoglobin 11.0L, Hematocrit 33L

, Mean Corpuscular Volume 82, Mean Corpuscular Hemoglobin 28, Mean Corpuscular 

Hemoglobin Concent 33, Red Cell Distribution Width 16.0H, Platelet Count 193, 

Mean Platelet Volume 11.2H, Sodium Level 141, Potassium Level 2.9L, Chloride 

Level 108H, Carbon Dioxide Level 18L, Anion Gap 15H, Blood Urea Nitrogen 10, 

Creatinine 0.70, Estimat Glomerular Filtration Rate > 60, BUN/Creatinine Ratio 

14, Glucose Level 142H, Calcium Level 9.1





Microbiology


3/2/18 Blood Culture - Preliminary, Resulted


         No growth


3/5/18 Urine Culture - Preliminary, Resulted


         Gram Negative Geovanni


         Yeast species





Assessment/Plan


Assessment/Plan


Assess & Plan/Chief Complaint


Urosepsis.


UTI.


Sirs.


Nonresponsive.


Parkinson disease.


Dementia.


Mental illness.


Diabetes.


To be evaluated by palliative care and speech therapy.


Inability to eat.


Prognosis poor 


Patient not eating or taken any fluids.


.


3/6/18.


UTI.


Sirs.


Nonresponsive.


Parkinson disease.


Dementia.


Mental illness.


Diabetes.


Hypokalemia.


Patient to be hospice tomorrow





Clinical Quality Measures


DVT/VTE Risk/Contraindication:


Risk Factor Score Per Nursin


RFS Level Per Nursing on Admit:  4+=Very High











LELAND DACOSTA DO Mar 6, 2018 07:57

## 2018-03-06 NOTE — SPEECH THERAPY PROGRESS NOTE
Therapy Progress Note


Speech pathology re-attempted swallowing evaluation on this date. The patient's 

sister is at bedside. The patient does not demonstrate appropriate alertness 

levels, not responding to the clinician with verbal prompts or gentle tactile 

prompts. The patient's keeps eyes closed throughout the clinician's attempt. 

The patient does not attempt to enclose lips around spoon or straw. At this time

, the patient remains inappropriate for completion of the dysphagia evaluation.











HUGH VALDES Mar 6, 2018 08:52

## 2018-03-07 VITALS — DIASTOLIC BLOOD PRESSURE: 73 MMHG | SYSTOLIC BLOOD PRESSURE: 168 MMHG

## 2018-03-07 RX ADMIN — LORAZEPAM PRN MG: 2 INJECTION INTRAMUSCULAR; INTRAVENOUS at 07:27

## 2018-03-07 RX ADMIN — SODIUM CHLORIDE SCH MLS/HR: 900 INJECTION, SOLUTION INTRAVENOUS at 00:46

## 2018-03-07 RX ADMIN — LORAZEPAM PRN MG: 2 INJECTION INTRAMUSCULAR; INTRAVENOUS at 03:24

## 2018-03-07 RX ADMIN — SODIUM CHLORIDE SCH MLS/HR: 900 INJECTION INTRAVENOUS at 08:54

## 2018-03-07 RX ADMIN — LORAZEPAM PRN MG: 2 INJECTION INTRAMUSCULAR; INTRAVENOUS at 11:29

## 2018-03-07 NOTE — DISCHARGE INST-SKILLED NURSING
Discharge Inst-Skilled NF


Patient Instructions


Patient Problems:  


Patient hospice.


Patient nonresponsive.


Patient terminal





Consult/Follow Up/Orders


Skilled NF Admit to:  [facility]


Certification (SNF)


I certify that SNF services are required to be given on an inpatient basis 

because of the above named patient's need for skilled nursing care on a 

continuing basis for the conditions(s) for which he/she was receiving inpatient 

hospital services prior to his/her transfer to the SNF.


Skilled Nursing Facility Order:  Nursing Services


Discharge Diet:  Other Diet (Patient not able to eat)


Daily Activity as Tolerated:  No





New & Resume Previous Orders


Edvin Dacosta 


Mar 7, 2018 


08:02











EDVIN DACOSTA DO Mar 7, 2018 08:03

## 2018-03-07 NOTE — PROGRESS NOTE (SOAP)
Subjective


Time Seen by Provider:  07:55


Subjective/Events-last exam


Patient unresponsive.


Patient to go on hospice at [facility] and [place].


Patient to be discharged today





Objective


Exam





Vital Signs








  Date Time  Temp Pulse Resp B/P (MAP) Pulse Ox O2 Delivery O2 Flow Rate FiO2


 


3/6/18 20:00      Room Air  


 


3/6/18 17:00 99.6 103 20 168/73 (104) 92 Room Air  


 


3/6/18 08:25      Room Air  














I & O 


 


 3/7/18





 07:00


 


Intake Total 150 ml


 


Output Total 850 ml


 


Balance -700 ml





Capillary Refill : Less Than 3 SecondsLess Than 3 Seconds


General Appearance:  WD/WN, Other (Resting comfortably)





Results


Lab





Microbiology


3/2/18 Blood Culture - Preliminary, Resulted


         No growth


3/5/18 Urine Culture - Preliminary, Resulted


         Escherichia coli


         Yeast species





Assessment/Plan


Assessment/Plan


Assess & Plan/Chief Complaint


Urosepsis.


UTI.


Sirs.


Nonresponsive.


Parkinson disease.


Dementia.


Mental illness.


Diabetes.


To be evaluated by palliative care and speech therapy.


Inability to eat.


Prognosis poor 


Patient not eating or taken any fluids.


.


3/.


UTI.


Sirs.


Nonresponsive.


Parkinson disease.


Dementia.


Mental illness.


Diabetes.


Hypokalemia.


Patient to be hospice tomorrow.


.


3 severe .


UTI.


Surgeries.


Parkinson disease.


Dementia.


Rectal illness.


Diabetes.


Parkinson disease.


Patient nonresponsive.


Patient resting comfortably.


Transfer patient today to nursing home with hospice





Clinical Quality Measures


DVT/VTE Risk/Contraindication:


Risk Factor Score Per Nursin


RFS Level Per Nursing on Admit:  4+=Very High











LELAND DACOSTA DO Mar 7, 2018 08:00

## 2018-03-08 NOTE — PHYSICIAN QUERY CLARIFICATION
PQ-Uncertain Diagnosis


Admission/Discharge


Admission Date: Mar 2, 2018 at 14:23 


Discharge Date:  Mar 7, 2018 at 13:20





The medical record reflects the following clinical scenario:





History/Risk Factors:


UTI


Dehydration





Clinical Findings:


WBC 16.5, Bands 0, T 97.2, Pulse 82, Resp 16, /80, Lactic acid 0.89, 

nonresponsive, final blood culture-no growth, urine culture E coli and Yeast





Treatment:


IV Ceftriaxone Sodium





Question: Your discharge summary gave the discharge diagnosis of sepsis. On a 

previous query, you indicated UTI only. Please clarify if sepsis was a 

clinically valid diagnosis and if so, was it present on admission or did it 

develop after admission?





Is Sepsis a clinically valid diagnosis?


Sepsis was documented in the discharge summary with no further documentation in 

the medical record. 





Please document a response below.





PHYSICIAN RESPONSE


Diagnosis clinically valid:  No, conditon ruled out








In responding to this query, please exercise your independent professional 

judgment.  The purpose of this communication is to more accurately reflect the 

complexity of your patients condition. The fact that a question is asked does 

not imply that any particular answer is desired or expected.  





Thank you for your timely response to this clarification.      


   


Requestors name: Charley Mcfadden [facility]   





Phone # ext [phone] or [phone]








THIS PHYSICIAN QUERY FORM IS A PERMANENT PART OF THE MEDICAL RECORD











CHARLEY MCFADDEN Mar 8, 2018 09:29


LELAND DACOSTA DO Mar 8, 2018 18:08

## 2018-03-08 NOTE — DISCHARGE SUMMARY
Diagnosis/Chief Complaint


Date of Admission


Mar 2, 2018 at 14:23


Date of Discharge


Mar 7, 2018 at 13:20


Discharge Date:  Mar 7, 2018


Discharge Time:  07:00


Discharge Diagnosis


Nonresponsive.


Parkinson disease.


Dehydration.


Sepsis.


Dementia.


Inability to eat.


UTI.


Due to Escherichia coli and yeast.


History of hypertension.


History of nicotine dependence.


Diabetes.





Discharge Summary


Discharge Physical Examination


Allergies:  


Coded Allergies:  


     codeine (Verified  Allergy, Unknown, 3/2/18)


Vitals & I&Os





Vital Signs








  Date Time  Temp Pulse Resp B/P (MAP) Pulse Ox O2 Delivery O2 Flow Rate FiO2


 


3/7/18 13:20  103 20 168/73 92 Room Air  


 


3/6/18 17:00 99.6       











Hospital Course


Patient nonresponsive during hospital stay.


Family in denial about sister who is her guardian.


Family wants hospice.


Patient transferred back to nursing home in [place]


Labs (last 24 hrs)


Laboratory Tests


3/2/18 13:00: 


White Blood Count 16.5H, Red Blood Count 5.09, Hemoglobin 13.7, Hematocrit 42, 

Mean Corpuscular Volume 82, Mean Corpuscular Hemoglobin 27, Mean Corpuscular 

Hemoglobin Concent 33, Red Cell Distribution Width 16.4H, Platelet Count 274, 

Mean Platelet Volume 11.3H, Neutrophils (%) (Auto) 79H, Lymphocytes (%) (Auto) 

10L, Monocytes (%) (Auto) 9, Eosinophils (%) (Auto) 2, Basophils (%) (Auto) 1, 

Neutrophils # (Auto) 13.0H, Lymphocytes # (Auto) 1.7, Monocytes # (Auto) 1.5H, 

Eosinophils # (Auto) 0.2, Basophils # (Auto) 0.1, Neutrophils % (Manual) 82, 

Lymphocytes % (Manual) 8, Monocytes % (Manual) 6, Eosinophils % (Manual) 3, 

Basophils % (Manual) 1, Band Neutrophils 0, Blood Morphology Comment NORMAL, 

Sodium Level 142, Potassium Level 4.7, Chloride Level 103, Carbon Dioxide Level 

26, Anion Gap 13, Blood Urea Nitrogen 30H, Creatinine 0.94, Estimat Glomerular 

Filtration Rate 59, BUN/Creatinine Ratio 32, Glucose Level 199H, Lactic Acid 

Level 0.89, Calcium Level 10.9H, Total Bilirubin 0.7, Aspartate Amino Transf (

AST/SGOT) 38H, Alanine Aminotransferase (ALT/SGPT) 72H, Alkaline Phosphatase 76

, Troponin I < 0.30, Total Protein 7.6, Albumin 4.4


3/2/18 13:20: 


Urine Color ORANGE, Urine Clarity CLEAR, Urine pH 5, Urine Specific Gravity 

1.020, Urine Protein 4+, Urine Glucose (UA) NEGATIVE, Urine Ketones 2+H, Urine 

Nitrite POSITIVEH, Urine Bilirubin 2+H, Urine Urobilinogen 1, Urine Leukocyte 

Esterase 1+H, Urine RBC (Auto) NEGATIVE, Urine RBC NONE, Urine WBC 0-2, Urine 

Squamous Epithelial Cells NONE, Urine Crystals NONE, Urine Bacteria FEWH, Urine 

Casts NONE, Urine Mucus NEGATIVE, Urine Culture Indicated YES


3/3/18 04:35: 


White Blood Count 15.0H, Red Blood Count 4.58, Hemoglobin 12.4, Hematocrit 38, 

Mean Corpuscular Volume 83, Mean Corpuscular Hemoglobin 27, Mean Corpuscular 

Hemoglobin Concent 33, Red Cell Distribution Width 16.4H, Platelet Count 225, 

Mean Platelet Volume 11.2H, Neutrophils (%) (Auto) 75, Lymphocytes (%) (Auto) 

11L, Monocytes (%) (Auto) 10, Eosinophils (%) (Auto) 3, Basophils (%) (Auto) 1, 

Neutrophils # (Auto) 11.3H, Lymphocytes # (Auto) 1.7, Monocytes # (Auto) 1.4H, 

Eosinophils # (Auto) 0.4H, Basophils # (Auto) 0.1, Sodium Level 141, Potassium 

Level 4.0, Chloride Level 108H, Carbon Dioxide Level 24, Anion Gap 9, Blood 

Urea Nitrogen 23H, Creatinine 0.78, Estimat Glomerular Filtration Rate > 60, BUN

/Creatinine Ratio 29, Glucose Level 176H, Calcium Level 10.1, Total Bilirubin 

0.6, Aspartate Amino Transf (AST/SGOT) 44H, Alanine Aminotransferase (ALT/SGPT) 

79H, Alkaline Phosphatase 74, Total Protein 7.1, Albumin 3.9


3/5/18 08:12: 


White Blood Count 13.0H, Red Blood Count 4.18L, Hemoglobin 11.3L, Hematocrit 35

, Mean Corpuscular Volume 83, Mean Corpuscular Hemoglobin 27, Mean Corpuscular 

Hemoglobin Concent 33, Red Cell Distribution Width 16.3H, Platelet Count 191, 

Mean Platelet Volume 10.9H, Sodium Level 140, Potassium Level 3.3L, Chloride 

Level 109H, Carbon Dioxide Level 21, Anion Gap 10, Blood Urea Nitrogen 11, 

Creatinine 0.68, Estimat Glomerular Filtration Rate > 60, BUN/Creatinine Ratio 

16, Glucose Level 131H, Calcium Level 9.5


3/5/18 11:25: 


Urine Color YELLOW, Urine Clarity CLEAR, Urine pH 6, Urine Specific Gravity 

1.015L, Urine Protein 3+H, Urine Glucose (UA) 1+H, Urine Ketones 3+H, Urine 

Nitrite NEGATIVE, Urine Bilirubin NEGATIVE, Urine Urobilinogen NORMAL, Urine 

Leukocyte Esterase 1+H, Urine RBC (Auto) 2+H, Urine RBC RARE, Urine WBC 25-50H, 

Urine Squamous Epithelial Cells RARE, Urine Crystals NONE, Urine Bacteria FEWH, 

Urine Casts NONE, Urine Mucus SMALLH, Urine Yeast MODERATEH, Urine Culture 

Indicated YES


3/6/18 05:50: 


White Blood Count 11.9H, Red Blood Count 4.00L, Hemoglobin 11.0L, Hematocrit 33L

, Mean Corpuscular Volume 82, Mean Corpuscular Hemoglobin 28, Mean Corpuscular 

Hemoglobin Concent 33, Red Cell Distribution Width 16.0H, Platelet Count 193, 

Mean Platelet Volume 11.2H, Sodium Level 141, Potassium Level 2.9L, Chloride 

Level 108H, Carbon Dioxide Level 18L, Anion Gap 15H, Blood Urea Nitrogen 10, 

Creatinine 0.70, Estimat Glomerular Filtration Rate > 60, BUN/Creatinine Ratio 

14, Glucose Level 142H, Calcium Level 9.1





Microbiology


3/2/18 Blood Culture - Final, Complete


         No growth


3/5/18 Urine Culture - Preliminary, Resulted


         Escherichia coli


         Yeast species





Laboratory Tests


3/2/18 13:00








3/3/18 04:35








3/5/18 08:12








3/6/18 05:50








Pending Labs





Microbiology








 Date/Time


Source Procedure


Growth Status


 


 


 3/2/18 13:27


Peripheral Rt Forearm Blood Culture - Final


No growth Complete


 


 3/2/18 13:00


Peripheral Left Wrist Blood Culture - Preliminary


Staph, Coag Neg (CNS)


See Comments Resulted


 


 3/5/18 11:25


Urine Clean Catch Urine Culture - Preliminary


Escherichia coli


Yeast species Resulted


 


 3/2/18 13:20


Urine Clean Catch Urine Culture - Final


Escherichia coli Complete





Laboratory Tests


3/2/18 13:00: 


White Blood Count 16.5, Red Blood Count 5.09, Hemoglobin 13.7, Hematocrit 42, 

Mean Corpuscular Volume 82, Mean Corpuscular Hemoglobin 27, Mean Corpuscular 

Hemoglobin Concent 33, Red Cell Distribution Width 16.4, Platelet Count 274, 

Mean Platelet Volume 11.3, Neutrophils (%) (Auto) 79, Lymphocytes (%) (Auto) 10

, Monocytes (%) (Auto) 9, Eosinophils (%) (Auto) 2, Basophils (%) (Auto) 1, 

Neutrophils # (Auto) 13.0, Lymphocytes # (Auto) 1.7, Monocytes # (Auto) 1.5, 

Eosinophils # (Auto) 0.2, Basophils # (Auto) 0.1, Neutrophils % (Manual) 82, 

Lymphocytes % (Manual) 8, Monocytes % (Manual) 6, Eosinophils % (Manual) 3, 

Basophils % (Manual) 1, Band Neutrophils 0, Blood Morphology Comment NORMAL, 

Sodium Level 142, Potassium Level 4.7, Chloride Level 103, Carbon Dioxide Level 

26, Anion Gap 13, Blood Urea Nitrogen 30, Creatinine 0.94, Estimat Glomerular 

Filtration Rate 59, BUN/Creatinine Ratio 32, Glucose Level 199, Lactic Acid 

Level 0.89, Calcium Level 10.9, Total Bilirubin 0.7, Aspartate Amino Transf (AST

/SGOT) 38, Alanine Aminotransferase (ALT/SGPT) 72, Alkaline Phosphatase 76, 

Troponin I < 0.30, Total Protein 7.6, Albumin 4.4


3/2/18 13:20: 


Urine Color ORANGE, Urine Clarity CLEAR, Urine pH 5, Urine Specific Gravity 

1.020, Urine Protein 4+, Urine Glucose (UA) NEGATIVE, Urine Ketones 2+, Urine 

Nitrite POSITIVE, Urine Bilirubin 2+, Urine Urobilinogen 1, Urine Leukocyte 

Esterase 1+, Urine RBC (Auto) NEGATIVE, Urine RBC NONE, Urine WBC 0-2, Urine 

Squamous Epithelial Cells NONE, Urine Crystals NONE, Urine Bacteria FEW, Urine 

Casts NONE, Urine Mucus NEGATIVE, Urine Culture Indicated YES


3/3/18 04:35: 


White Blood Count 15.0, Red Blood Count 4.58, Hemoglobin 12.4, Hematocrit 38, 

Mean Corpuscular Volume 83, Mean Corpuscular Hemoglobin 27, Mean Corpuscular 

Hemoglobin Concent 33, Red Cell Distribution Width 16.4, Platelet Count 225, 

Mean Platelet Volume 11.2, Neutrophils (%) (Auto) 75, Lymphocytes (%) (Auto) 11

, Monocytes (%) (Auto) 10, Eosinophils (%) (Auto) 3, Basophils (%) (Auto) 1, 

Neutrophils # (Auto) 11.3, Lymphocytes # (Auto) 1.7, Monocytes # (Auto) 1.4, 

Eosinophils # (Auto) 0.4, Basophils # (Auto) 0.1, Sodium Level 141, Potassium 

Level 4.0, Chloride Level 108, Carbon Dioxide Level 24, Anion Gap 9, Blood Urea 

Nitrogen 23, Creatinine 0.78, Estimat Glomerular Filtration Rate > 60, BUN/

Creatinine Ratio 29, Glucose Level 176, Calcium Level 10.1, Total Bilirubin 0.6

, Aspartate Amino Transf (AST/SGOT) 44, Alanine Aminotransferase (ALT/SGPT) 79, 

Alkaline Phosphatase 74, Total Protein 7.1, Albumin 3.9


3/5/18 08:12: 


White Blood Count 13.0, Red Blood Count 4.18, Hemoglobin 11.3, Hematocrit 35, 

Mean Corpuscular Volume 83, Mean Corpuscular Hemoglobin 27, Mean Corpuscular 

Hemoglobin Concent 33, Red Cell Distribution Width 16.3, Platelet Count 191, 

Mean Platelet Volume 10.9, Sodium Level 140, Potassium Level 3.3, Chloride 

Level 109, Carbon Dioxide Level 21, Anion Gap 10, Blood Urea Nitrogen 11, 

Creatinine 0.68, Estimat Glomerular Filtration Rate > 60, BUN/Creatinine Ratio 

16, Glucose Level 131, Calcium Level 9.5


3/5/18 11:25: 


Urine Color YELLOW, Urine Clarity CLEAR, Urine pH 6, Urine Specific Gravity 

1.015, Urine Protein 3+, Urine Glucose (UA) 1+, Urine Ketones 3+, Urine Nitrite 

NEGATIVE, Urine Bilirubin NEGATIVE, Urine Urobilinogen NORMAL, Urine Leukocyte 

Esterase 1+, Urine RBC (Auto) 2+, Urine RBC RARE, Urine WBC 25-50, Urine 

Squamous Epithelial Cells RARE, Urine Crystals NONE, Urine Bacteria FEW, Urine 

Casts NONE, Urine Mucus SMALL, Urine Yeast MODERATE, Urine Culture Indicated YES


3/6/18 05:50: 


White Blood Count 11.9, Red Blood Count 4.00, Hemoglobin 11.0, Hematocrit 33, 

Mean Corpuscular Volume 82, Mean Corpuscular Hemoglobin 28, Mean Corpuscular 

Hemoglobin Concent 33, Red Cell Distribution Width 16.0, Platelet Count 193, 

Mean Platelet Volume 11.2, Sodium Level 141, Potassium Level 2.9, Chloride 

Level 108, Carbon Dioxide Level 18, Anion Gap 15, Blood Urea Nitrogen 10, 

Creatinine 0.70, Estimat Glomerular Filtration Rate > 60, BUN/Creatinine Ratio 

14, Glucose Level 142, Calcium Level 9.1





Discharge


Home Medications:





Active Scripts


Active


No Active Prescriptions or Reported Medications    





Instructions to patient/family


Please see electronic discharge instructions given to patient.





Clinical Quality Measures


DVT/VTE Risk/Contraindication:


Risk Factor Score Per Nursin


RFS Level Per Nursing on Admit:  4+=Very High











LELAND DACOSTA DO Mar 8, 2018 07:04

## 2019-05-08 NOTE — PHYSICAL THERAPY PROGRESS NOTE
Therapy Progress Note


Patient dismissing to home on this date with continued home health 

intervention.  Patient declined PT this a.m. due to returning to home on this 

date.  Patient voices no c/o at this time.


1 visit


DC ([id number])











DAR SCHMIDT PT Jun 12, 2017 11:24 normal... appears her stated age. awake, alert, oriented to person, place, time/situation and in no apparent distress.